# Patient Record
Sex: MALE | Race: WHITE | Employment: OTHER | URBAN - METROPOLITAN AREA
[De-identification: names, ages, dates, MRNs, and addresses within clinical notes are randomized per-mention and may not be internally consistent; named-entity substitution may affect disease eponyms.]

---

## 2017-05-25 PROBLEM — Z72.0 TOBACCO ABUSE: Chronic | Status: ACTIVE | Noted: 2017-05-25

## 2017-05-25 PROBLEM — I25.10 CORONARY ARTERY DISEASE INVOLVING NATIVE CORONARY ARTERY OF NATIVE HEART WITHOUT ANGINA PECTORIS: Chronic | Status: ACTIVE | Noted: 2017-05-25

## 2019-04-09 ENCOUNTER — PATIENT OUTREACH (OUTPATIENT)
Dept: CASE MANAGEMENT | Age: 74
End: 2019-04-09

## 2019-04-15 ENCOUNTER — PATIENT OUTREACH (OUTPATIENT)
Dept: CASE MANAGEMENT | Age: 74
End: 2019-04-15

## 2019-04-15 NOTE — PROGRESS NOTES
Medication Adherence Outreach Date/Time of Call:  4/15/2019 Name of medication and dosage:   IRBESARTAN  MG Does the patient know the purpose and dosage of medication? Yes Are you getting a #30 day or #90 day supply of your medication? #90 Are you still taking this medication? If not, why? Yes  
Transportation issues or any problems paying for the medication or other reason? No  
What pharmacy are you using to fill your medication and do you know the last time that you got your medication filled? [address] [place] [address] Last fill 3/8/2019 Are you having any side effects from taking your medication? No 
   
Any other questions or concerns expressed by the patient. No  
Comments:   
 Discussed Medication Adherence with patient-he stated that there are no barriers preventing him from obtaining or taking his medication. He is getting a 90 day refill from his PCP-No further outreach at this time. Call Completed By:   
 Ghanshyam Mckeon LPN Care Coordinator Josselin LINDER-[phone] [email or web address]

## 2019-07-22 ENCOUNTER — HOSPITAL ENCOUNTER (OUTPATIENT)
Dept: LAB | Age: 74
Discharge: HOME | End: 2019-07-22

## 2019-07-22 PROCEDURE — 88305 TISSUE EXAM BY PATHOLOGIST: CPT

## 2019-08-13 ENCOUNTER — HOSPITAL ENCOUNTER (OUTPATIENT)
Dept: LAB | Age: 74
Discharge: HOME | End: 2019-08-13

## 2019-08-13 PROCEDURE — 88305 TISSUE EXAM BY PATHOLOGIST: CPT

## 2019-08-13 PROCEDURE — 88312 SPECIAL STAINS GROUP 1: CPT

## 2020-05-20 PROBLEM — R00.0 SINUS TACHYCARDIA: Status: ACTIVE | Noted: 2020-05-20

## 2021-06-21 PROBLEM — N18.9 CHRONIC KIDNEY DISEASE: Status: ACTIVE | Noted: 2021-06-21

## 2021-10-11 ENCOUNTER — TRANSCRIBE ORDER (OUTPATIENT)
Dept: SCHEDULING | Age: 76
End: 2021-10-11

## 2021-10-11 DIAGNOSIS — N18.32 STAGE 3B CHRONIC KIDNEY DISEASE (HCC): Primary | ICD-10-CM

## 2021-10-14 ENCOUNTER — HOSPITAL ENCOUNTER (OUTPATIENT)
Age: 76
Setting detail: OBSERVATION
Discharge: HOME HEALTH | End: 2021-10-16
Attending: EMERGENCY MEDICINE | Admitting: INTERNAL MEDICINE
Payer: MEDICARE

## 2021-10-14 ENCOUNTER — APPOINTMENT (OUTPATIENT)
Dept: CT IMAGING | Age: 76
End: 2021-10-14
Attending: EMERGENCY MEDICINE
Payer: MEDICARE

## 2021-10-14 ENCOUNTER — APPOINTMENT (OUTPATIENT)
Dept: GENERAL RADIOLOGY | Age: 76
End: 2021-10-14
Attending: EMERGENCY MEDICINE
Payer: MEDICARE

## 2021-10-14 DIAGNOSIS — E16.0 HYPOGLYCEMIA SECONDARY TO SULFONYLUREA, ACCIDENTAL OR UNINTENTIONAL, INITIAL ENCOUNTER: ICD-10-CM

## 2021-10-14 DIAGNOSIS — R55 SYNCOPE, UNSPECIFIED SYNCOPE TYPE: Primary | ICD-10-CM

## 2021-10-14 DIAGNOSIS — T38.3X1A HYPOGLYCEMIA SECONDARY TO SULFONYLUREA, ACCIDENTAL OR UNINTENTIONAL, INITIAL ENCOUNTER: ICD-10-CM

## 2021-10-14 PROBLEM — E16.2 HYPOGLYCEMIA: Status: ACTIVE | Noted: 2021-10-14

## 2021-10-14 PROBLEM — N18.30 CKD (CHRONIC KIDNEY DISEASE) STAGE 3, GFR 30-59 ML/MIN (HCC): Status: ACTIVE | Noted: 2021-06-21

## 2021-10-14 LAB
ALBUMIN SERPL-MCNC: 3.2 G/DL (ref 3.2–4.6)
ALBUMIN/GLOB SERPL: 0.8 {RATIO} (ref 1.2–3.5)
ALP SERPL-CCNC: 122 U/L (ref 50–136)
ALT SERPL-CCNC: 25 U/L (ref 12–65)
ANION GAP SERPL CALC-SCNC: 7 MMOL/L (ref 7–16)
AST SERPL-CCNC: 39 U/L (ref 15–37)
ATRIAL RATE: 69 BPM
BASOPHILS # BLD: 0 K/UL (ref 0–0.2)
BASOPHILS NFR BLD: 1 % (ref 0–2)
BILIRUB SERPL-MCNC: 0.4 MG/DL (ref 0.2–1.1)
BUN SERPL-MCNC: 28 MG/DL (ref 8–23)
CALCIUM SERPL-MCNC: 8.7 MG/DL (ref 8.3–10.4)
CALCULATED P AXIS, ECG09: 66 DEGREES
CALCULATED R AXIS, ECG10: -12 DEGREES
CALCULATED T AXIS, ECG11: 82 DEGREES
CHLORIDE SERPL-SCNC: 105 MMOL/L (ref 98–107)
CO2 SERPL-SCNC: 26 MMOL/L (ref 21–32)
CREAT SERPL-MCNC: 2.03 MG/DL (ref 0.8–1.5)
DIAGNOSIS, 93000: NORMAL
DIFFERENTIAL METHOD BLD: ABNORMAL
EOSINOPHIL # BLD: 0.1 K/UL (ref 0–0.8)
EOSINOPHIL NFR BLD: 2 % (ref 0.5–7.8)
ERYTHROCYTE [DISTWIDTH] IN BLOOD BY AUTOMATED COUNT: 14.2 % (ref 11.9–14.6)
GLOBULIN SER CALC-MCNC: 3.9 G/DL (ref 2.3–3.5)
GLUCOSE BLD STRIP.AUTO-MCNC: 152 MG/DL (ref 65–100)
GLUCOSE BLD STRIP.AUTO-MCNC: 237 MG/DL (ref 65–100)
GLUCOSE BLD STRIP.AUTO-MCNC: 237 MG/DL (ref 65–100)
GLUCOSE SERPL-MCNC: 213 MG/DL (ref 65–100)
HCT VFR BLD AUTO: 47.4 % (ref 41.1–50.3)
HGB BLD-MCNC: 15.2 G/DL (ref 13.6–17.2)
IMM GRANULOCYTES # BLD AUTO: 0 K/UL (ref 0–0.5)
IMM GRANULOCYTES NFR BLD AUTO: 0 % (ref 0–5)
LYMPHOCYTES # BLD: 1.9 K/UL (ref 0.5–4.6)
LYMPHOCYTES NFR BLD: 39 % (ref 13–44)
MAGNESIUM SERPL-MCNC: 2.3 MG/DL (ref 1.8–2.4)
MCH RBC QN AUTO: 32 PG (ref 26.1–32.9)
MCHC RBC AUTO-ENTMCNC: 32.1 G/DL (ref 31.4–35)
MCV RBC AUTO: 99.8 FL (ref 79.6–97.8)
MONOCYTES # BLD: 0.9 K/UL (ref 0.1–1.3)
MONOCYTES NFR BLD: 18 % (ref 4–12)
NEUTS SEG # BLD: 1.9 K/UL (ref 1.7–8.2)
NEUTS SEG NFR BLD: 40 % (ref 43–78)
NRBC # BLD: 0 K/UL (ref 0–0.2)
P-R INTERVAL, ECG05: 204 MS
PLATELET # BLD AUTO: 168 K/UL (ref 150–450)
PMV BLD AUTO: 11.1 FL (ref 9.4–12.3)
POTASSIUM SERPL-SCNC: 5.2 MMOL/L (ref 3.5–5.1)
PROT SERPL-MCNC: 7.1 G/DL (ref 6.3–8.2)
Q-T INTERVAL, ECG07: 388 MS
QRS DURATION, ECG06: 74 MS
QTC CALCULATION (BEZET), ECG08: 415 MS
RBC # BLD AUTO: 4.75 M/UL (ref 4.23–5.6)
SERVICE CMNT-IMP: ABNORMAL
SODIUM SERPL-SCNC: 138 MMOL/L (ref 136–145)
TROPONIN-HIGH SENSITIVITY: 14.9 PG/ML (ref 0–14)
VENTRICULAR RATE, ECG03: 69 BPM
WBC # BLD AUTO: 4.8 K/UL (ref 4.3–11.1)

## 2021-10-14 PROCEDURE — 82962 GLUCOSE BLOOD TEST: CPT

## 2021-10-14 PROCEDURE — 74011250637 HC RX REV CODE- 250/637: Performed by: INTERNAL MEDICINE

## 2021-10-14 PROCEDURE — 80053 COMPREHEN METABOLIC PANEL: CPT

## 2021-10-14 PROCEDURE — 83735 ASSAY OF MAGNESIUM: CPT

## 2021-10-14 PROCEDURE — 84484 ASSAY OF TROPONIN QUANT: CPT

## 2021-10-14 PROCEDURE — 94664 DEMO&/EVAL PT USE INHALER: CPT

## 2021-10-14 PROCEDURE — 74011250636 HC RX REV CODE- 250/636: Performed by: INTERNAL MEDICINE

## 2021-10-14 PROCEDURE — 94640 AIRWAY INHALATION TREATMENT: CPT

## 2021-10-14 PROCEDURE — 65390000012 HC CONDITION CODE 44 OBSERVATION

## 2021-10-14 PROCEDURE — 85025 COMPLETE CBC W/AUTO DIFF WBC: CPT

## 2021-10-14 PROCEDURE — 71045 X-RAY EXAM CHEST 1 VIEW: CPT

## 2021-10-14 PROCEDURE — 65660000000 HC RM CCU STEPDOWN

## 2021-10-14 PROCEDURE — 70450 CT HEAD/BRAIN W/O DYE: CPT

## 2021-10-14 PROCEDURE — 93005 ELECTROCARDIOGRAM TRACING: CPT | Performed by: EMERGENCY MEDICINE

## 2021-10-14 PROCEDURE — 99285 EMERGENCY DEPT VISIT HI MDM: CPT

## 2021-10-14 PROCEDURE — 74011000250 HC RX REV CODE- 250: Performed by: EMERGENCY MEDICINE

## 2021-10-14 PROCEDURE — 94762 N-INVAS EAR/PLS OXIMTRY CONT: CPT

## 2021-10-14 RX ORDER — SODIUM CHLORIDE 0.9 % (FLUSH) 0.9 %
5-10 SYRINGE (ML) INJECTION EVERY 8 HOURS
Status: DISCONTINUED | OUTPATIENT
Start: 2021-10-14 | End: 2021-10-16 | Stop reason: HOSPADM

## 2021-10-14 RX ORDER — TAMSULOSIN HYDROCHLORIDE 0.4 MG/1
0.8 CAPSULE ORAL EVERY EVENING
Status: DISCONTINUED | OUTPATIENT
Start: 2021-10-14 | End: 2021-10-16 | Stop reason: HOSPADM

## 2021-10-14 RX ORDER — IPRATROPIUM BROMIDE AND ALBUTEROL SULFATE 2.5; .5 MG/3ML; MG/3ML
3 SOLUTION RESPIRATORY (INHALATION)
Status: COMPLETED | OUTPATIENT
Start: 2021-10-14 | End: 2021-10-14

## 2021-10-14 RX ORDER — ENOXAPARIN SODIUM 100 MG/ML
40 INJECTION SUBCUTANEOUS EVERY 24 HOURS
Status: DISCONTINUED | OUTPATIENT
Start: 2021-10-14 | End: 2021-10-16 | Stop reason: HOSPADM

## 2021-10-14 RX ORDER — SODIUM CHLORIDE 0.9 % (FLUSH) 0.9 %
5-40 SYRINGE (ML) INJECTION AS NEEDED
Status: DISCONTINUED | OUTPATIENT
Start: 2021-10-14 | End: 2021-10-16 | Stop reason: HOSPADM

## 2021-10-14 RX ORDER — DILTIAZEM HYDROCHLORIDE 120 MG/1
120 CAPSULE, COATED, EXTENDED RELEASE ORAL DAILY
Status: DISCONTINUED | OUTPATIENT
Start: 2021-10-15 | End: 2021-10-16 | Stop reason: HOSPADM

## 2021-10-14 RX ORDER — ONDANSETRON 2 MG/ML
4 INJECTION INTRAMUSCULAR; INTRAVENOUS
Status: DISCONTINUED | OUTPATIENT
Start: 2021-10-14 | End: 2021-10-16 | Stop reason: HOSPADM

## 2021-10-14 RX ORDER — SODIUM CHLORIDE 0.9 % (FLUSH) 0.9 %
5-10 SYRINGE (ML) INJECTION AS NEEDED
Status: DISCONTINUED | OUTPATIENT
Start: 2021-10-14 | End: 2021-10-16 | Stop reason: HOSPADM

## 2021-10-14 RX ORDER — ASPIRIN 81 MG/1
81 TABLET ORAL DAILY
Status: DISCONTINUED | OUTPATIENT
Start: 2021-10-15 | End: 2021-10-16 | Stop reason: HOSPADM

## 2021-10-14 RX ORDER — POLYETHYLENE GLYCOL 3350 17 G/17G
17 POWDER, FOR SOLUTION ORAL DAILY PRN
Status: DISCONTINUED | OUTPATIENT
Start: 2021-10-14 | End: 2021-10-16 | Stop reason: HOSPADM

## 2021-10-14 RX ORDER — DULOXETIN HYDROCHLORIDE 60 MG/1
60 CAPSULE, DELAYED RELEASE ORAL 2 TIMES DAILY
Status: DISCONTINUED | OUTPATIENT
Start: 2021-10-14 | End: 2021-10-16 | Stop reason: HOSPADM

## 2021-10-14 RX ORDER — ACETAMINOPHEN 650 MG/1
650 SUPPOSITORY RECTAL
Status: DISCONTINUED | OUTPATIENT
Start: 2021-10-14 | End: 2021-10-16 | Stop reason: HOSPADM

## 2021-10-14 RX ORDER — ATORVASTATIN CALCIUM 10 MG/1
20 TABLET, FILM COATED ORAL DAILY
Status: DISCONTINUED | OUTPATIENT
Start: 2021-10-15 | End: 2021-10-14 | Stop reason: SDUPTHER

## 2021-10-14 RX ORDER — PROMETHAZINE HYDROCHLORIDE 25 MG/1
12.5 TABLET ORAL
Status: DISCONTINUED | OUTPATIENT
Start: 2021-10-14 | End: 2021-10-16 | Stop reason: HOSPADM

## 2021-10-14 RX ORDER — SODIUM CHLORIDE 0.9 % (FLUSH) 0.9 %
5-40 SYRINGE (ML) INJECTION EVERY 8 HOURS
Status: DISCONTINUED | OUTPATIENT
Start: 2021-10-14 | End: 2021-10-16 | Stop reason: HOSPADM

## 2021-10-14 RX ORDER — ACETAMINOPHEN 325 MG/1
650 TABLET ORAL
Status: DISCONTINUED | OUTPATIENT
Start: 2021-10-14 | End: 2021-10-16 | Stop reason: HOSPADM

## 2021-10-14 RX ORDER — ATORVASTATIN CALCIUM 10 MG/1
20 TABLET, FILM COATED ORAL
Status: DISCONTINUED | OUTPATIENT
Start: 2021-10-14 | End: 2021-10-16 | Stop reason: HOSPADM

## 2021-10-14 RX ORDER — GABAPENTIN 300 MG/1
600 CAPSULE ORAL 3 TIMES DAILY
Status: DISCONTINUED | OUTPATIENT
Start: 2021-10-14 | End: 2021-10-16 | Stop reason: HOSPADM

## 2021-10-14 RX ORDER — TAMSULOSIN HYDROCHLORIDE 0.4 MG/1
0.4 CAPSULE ORAL DAILY
Status: DISCONTINUED | OUTPATIENT
Start: 2021-10-15 | End: 2021-10-14

## 2021-10-14 RX ADMIN — Medication 10 ML: at 21:34

## 2021-10-14 RX ADMIN — GABAPENTIN 600 MG: 300 CAPSULE ORAL at 21:27

## 2021-10-14 RX ADMIN — DULOXETINE HYDROCHLORIDE 60 MG: 60 CAPSULE, DELAYED RELEASE ORAL at 21:27

## 2021-10-14 RX ADMIN — TAMSULOSIN HYDROCHLORIDE 0.8 MG: 0.4 CAPSULE ORAL at 23:27

## 2021-10-14 RX ADMIN — IPRATROPIUM BROMIDE AND ALBUTEROL SULFATE 3 ML: .5; 3 SOLUTION RESPIRATORY (INHALATION) at 16:35

## 2021-10-14 RX ADMIN — ATORVASTATIN CALCIUM 20 MG: 10 TABLET, FILM COATED ORAL at 21:27

## 2021-10-14 NOTE — ED TRIAGE NOTES
Pt had two syncopal episodes last night, denies hitting head, denies BT. Per doctors care had blood sugar of 40 this morning. Doctors care gave him oral glucose and candy, last sugar 147. Pt A&O x4, currently lightheaded. Pt has diabetes, states he takes pills for sugar but does not check his sugar at home. Mask applied to pt.

## 2021-10-14 NOTE — H&P
[facility]   History and Physical       Name:  Libertad Nieves  Age: 64 y.o. Sex: male   :  1945    MRN:  [id number]   PCP:  Justin Chowdhury MD      Admit Date:  10/14/2021  2:03 PM   Presenting Complaint: Syncope and Low Blood Sugar      Reason for Admission:  Syncope and collapse [R55]  Hypoglycemia [E16.2]    Assessment & Plan:   Syncope and collapse  Possibly due to hypoglycemic event vs reaction to COVID19 booster, will need to rule out cardiogenic event. CT head reviewed. No intracranial abnormalities, chronic right maxillary sinusitis. - Telemetry  - PT    Hypoglycemic episodes  T2DM w/ diabetic polyneuropathy  A1c of 6.8 in 2021 and patient is on Januvia, Actos, glipizide. Reports no change in his diet or p.o. intake. - hold all oral agents  - ISS  - may need to stop Actos and glipizide on dc  - diabetic education consulted  - gabapentin  -If hypoglycemic again, consider sending serum insulin, C-peptide. CKD Stage 3  Cr of 2.03 on admission with last Cr of 1.93 on . It appears that CKD has been progressively getting worse. - urine electrolytes, renal ultrasounds   - dc actos as some studies has shown that it can cause CKD. HTN // HLD // CAD  -continue with home Cardizem, aspirin, Lipitor. COPD  Stable. Nebs PRN      Disposition: Inpatient admission, 2+ midnights  Diet: Diabetic     VTE ppx: lovenox  CODE STATUS: FULL      I have reviewed and ordered clinical lab tests and independently visualized images, tracing. I have reviewed, updated, and verified this note's content and spent 36 minutes of my 41 minutes visit performing counseling and coordination of care regarding medical management      History of Presenting Illness:     Libertad Nieves is a 64 y.o. male with medical history of pretension, hyperlipidemia, type 2 diabetes with polyneuropathy, CAD . CKD3 who presented to the ED from urgent care with hypoglycemic episode.   Patient reports that he received COVID-19 booster on 10/12 and started to feel unwell on 10/13. He had 2 syncopal episodes with one episode positive for loss of consciousness for about 1 second. He fell to his left side injuring his arm. He unclear if he hit his head. Patient went to the urgent care today to be hypoglycemic with fingerstick sugar of 40. He was given oral glucose and candy with repeat glucose of 147 and sent to emergency room. Patient reports that he was in normal state of health, without any fever, chills, shortness of breath, chest pain,, abdominal pain. Denies any change in his diet or decreased p.o. intake. In the ED, patient is hemodynamically stable and saturating well on room air. Fingerstick of 237 on arrival.  Laboratory work up is remarkable for potassium 5.2, BUN 28, creatinine 2.03. EKG with normal sinus rhythm with sinus arrhythmia. Review of Systems: 10 systems reviewed and negative except as noted in HPI. Past Medical History:   Diagnosis Date    Chronic airway obstruction, not elsewhere classified 7/1/2015    Essential hypertension, benign 7/1/2015    History of colonic polyps     Inflammatory and toxic neuropathy, unspecified 7/1/2015    Neoplasm of uncertain behavior of skin     Neuralgia, neuritis, and radiculitis, unspecified     Other and unspecified hyperlipidemia 7/1/2015    Type II or unspecified type diabetes mellitus without mention of complication, not stated as uncontrolled     Type II or unspecified type diabetes mellitus without mention of complication, uncontrolled 7/1/2015    Ulcer of ankle ([facility] [address] 7/1/2015       Past Surgical History:   Procedure Laterality Date    HX COLONOSCOPY  2007    w/ Dr. Ning Barrera         Family History : reviewed.    Family History   Problem Relation Age of Onset    Hypertension Father         Social History     Tobacco Use    Smoking status: Current Every Day Smoker     Packs/day: 0.50    Smokeless tobacco: Never Used   Substance Use Topics    Alcohol use: Yes     Alcohol/week: 0.0 standard drinks     Comment: Rare       Allergies   Allergen Reactions    Codeine Sulfate Unknown (comments)       Immunization History   Administered Date(s) Administered    COVID-19, PFIZER, MRNA, LNP-S, PF, 30MCG/0.3ML DOSE 01/22/2021, 02/17/2021    Influenza High Dose Vaccine PF 09/16/2016, 09/20/2018    Influenza Vaccine 09/27/2010, 09/08/2014, 10/01/2019, 10/10/2020    Influenza Vaccine (Tri) Adjuvanted (>65 Yrs FLUAD TRI [id number]) 09/17/2018, 10/09/2019    Influenza Vaccine (Whole Virus) 10/10/2008    Influenza, Quadrivalent, Adjuvanted (>65 Yrs FLUAD QUAD [id number]) 10/05/2020    Pneumococcal Conjugate (PCV-13) 09/16/2016, 10/16/2020    Pneumococcal Vaccine (Unspecified Type) 11/18/2009    Zoster Recombinant 10/16/2020         PTA Medications:  Current Outpatient Medications   Medication Instructions    aspirin delayed-release 81 mg tablet Oral, DAILY    atorvastatin (LIPITOR) 20 mg tablet TAKE ONE TABLET BY MOUTH ONCE DAILY    budesonide-formoteroL (SYMBICORT) 80-4.5 mcg/actuation HFAA 2 Puffs, Inhalation, 2 TIMES DAILY    dilTIAZem ER (CARDIZEM CD) 120 mg, Oral, DAILY    DULoxetine (CYMBALTA) 60 mg capsule TAKE ONE CAPSULE BY MOUTH TWICE A DAY    gabapentin (NEURONTIN) 600 mg tablet TAKE ONE TABLET BY MOUTH THREE TIMES A DAY    glipiZIDE (GLUCOTROL) 10 mg tablet TAKE 1 TABLET DAILY (BEFORE BREAKFAST).     melatonin 3 mg tablet Oral    OTHER Neuriva Take 1 capsule by mouth by once daily     pioglitazone (ACTOS) 15 mg, Oral, DAILY    SITagliptin (JANUVIA) 50 mg, Oral, DAILY    tamsulosin (FLOMAX) 0.4 mg capsule TAKE 2 CAPSULES ONCE A DAY      AFTER EVENING MEAL    valACYclovir (VALTREX) 1,000 mg, Oral, 2 TIMES DAILY, With food    zinc 50 mg tab tablet Oral, DAILY       Objective:     Patient Vitals for the past 24 hrs:   Temp Pulse Resp BP SpO2   10/14/21 1636     95 %   10/14/21 1406 97.7 °F (36.5 °C) 84 18 (!) 145/82 95 %       Oxygen Therapy  O2 Sat (%): 95 % (10/14/21 1636)  Pulse via Oximetry: 60 beats per minute (10/14/21 1636)  O2 Device: None (Room air) (10/14/21 1636)    Body mass index is 30.42 kg/m². Physical Exam:     General:     alert, awake, no acute distress. HENT:   normocephalic, atraumatic. Eyes:   anicteric sclerae, moist conjunctiva, EOMI  Neck:    supple, non-tender. Trachea midline. Lungs:   Scattered crackles  Cardiac:   RRR, Normal S1 and S2. Abdomen:   Soft, non distended, nontender, +BS, no guarding/rebound  Extremities:   No edema , pedal pulses present, no digital cyanosis  Skin:   Warn, dry, normnal turgor and texture; bruising to left arm  Neuro:  AAOx3.  No gross focal neurological deficit  Psychiatric:  No anxiety, calm, cooperative    Data Reviewed:  I have reviewed ordered lab tests and independently visualized imaging below:    Recent Results (from the past 24 hour(s))   GLUCOSE, POC    Collection Time: 10/14/21  2:13 PM   Result Value Ref Range    Glucose (POC) 237 (H) 65 - 100 mg/dL    Performed by Rusty    EKG, 12 LEAD, INITIAL    Collection Time: 10/14/21  2:37 PM   Result Value Ref Range    Ventricular Rate 69 BPM    Atrial Rate 69 BPM    P-R Interval 204 ms    QRS Duration 74 ms    Q-T Interval 388 ms    QTC Calculation (Bezet) 415 ms    Calculated P Axis 66 degrees    Calculated R Axis -12 degrees    Calculated T Axis 82 degrees    Diagnosis       Normal sinus rhythm with sinus arrhythmia  Normal ECG  No previous ECGs available  Confirmed by Ángela Magallon ([id number]) on 10/14/2021 4:09:42 PM     CBC WITH AUTOMATED DIFF    Collection Time: 10/14/21  2:44 PM   Result Value Ref Range    WBC 4.8 4.3 - 11.1 K/uL    RBC 4.75 4.23 - 5.6 M/uL    HGB 15.2 13.6 - 17.2 g/dL    HCT 47.4 41.1 - 50.3 %    MCV 99.8 (H) 79.6 - 97.8 FL    MCH 32.0 26.1 - 32.9 PG    MCHC 32.1 31.4 - 35.0 g/dL    RDW 14.2 11.9 - 14.6 %    PLATELET 191 926 - 733 K/uL    MPV 11.1 9.4 - 12.3 FL    ABSOLUTE NRBC 0. 00 0.0 - 0.2 K/uL    DF AUTOMATED      NEUTROPHILS 40 (L) 43 - 78 %    LYMPHOCYTES 39 13 - 44 %    MONOCYTES 18 (H) 4.0 - 12.0 %    EOSINOPHILS 2 0.5 - 7.8 %    BASOPHILS 1 0.0 - 2.0 %    IMMATURE GRANULOCYTES 0 0.0 - 5.0 %    ABS. NEUTROPHILS 1.9 1.7 - 8.2 K/UL    ABS. LYMPHOCYTES 1.9 0.5 - 4.6 K/UL    ABS. MONOCYTES 0.9 0.1 - 1.3 K/UL    ABS. EOSINOPHILS 0.1 0.0 - 0.8 K/UL    ABS. BASOPHILS 0.0 0.0 - 0.2 K/UL    ABS. IMM. GRANS. 0.0 0.0 - 0.5 K/UL   METABOLIC PANEL, COMPREHENSIVE    Collection Time: 10/14/21  2:44 PM   Result Value Ref Range    Sodium 138 136 - 145 mmol/L    Potassium 5.2 (H) 3.5 - 5.1 mmol/L    Chloride 105 98 - 107 mmol/L    CO2 26 21 - 32 mmol/L    Anion gap 7 7 - 16 mmol/L    Glucose 213 (H) 65 - 100 mg/dL    BUN 28 (H) 8 - 23 MG/DL    Creatinine 2.03 (H) 0.8 - 1.5 MG/DL    GFR est AA 41 (L) >60 ml/min/1.73m2    GFR est non-AA 34 (L) >60 ml/min/1.73m2    Calcium 8.7 8.3 - 10.4 MG/DL    Bilirubin, total 0.4 0.2 - 1.1 MG/DL    ALT (SGPT) 25 12 - 65 U/L    AST (SGOT) 39 (H) 15 - 37 U/L    Alk. phosphatase 122 50 - 136 U/L    Protein, total 7.1 6.3 - 8.2 g/dL    Albumin 3.2 3.2 - 4.6 g/dL    Globulin 3.9 (H) 2.3 - 3.5 g/dL    A-G Ratio 0.8 (L) 1.2 - 3.5     MAGNESIUM    Collection Time: 10/14/21  2:44 PM   Result Value Ref Range    Magnesium 2.3 1.8 - 2.4 mg/dL   TROPONIN-HIGH SENSITIVITY    Collection Time: 10/14/21  2:44 PM   Result Value Ref Range    Troponin-High Sensitivity 14.9 (H) 0 - 14 pg/mL   GLUCOSE, POC    Collection Time: 10/14/21  4:02 PM   Result Value Ref Range    Glucose (POC) 152 (H) 65 - 100 mg/dL    Performed by Mackinac Rocher        All Micro Results     None          Other Studies:  CT HEAD WO CONT    Result Date: 10/14/2021  CT HEAD WITHOUT CONTRAST HISTORY:  Syncope. COMPARISON: None. TECHNIQUE: Axial imaging was performed without intravenous contrast utilizing 5mm slice thickness. Sagittal and coronal reformats were performed.  Radiation dose reduction techniques were used for this study. Our CT scanner uses one or all of the following: Automated exposure control, adjustment of the MAS or KUB according to patient's size and iterative reconstruction. FINDINGS:    *BRAIN:    -  There are no early signs of territorial or lacunar infarction by CT.    -  No intracranial mass, hematoma, or hydrocephalus.    -  No gross white matter abnormality by CT. *VISUALIZED PARANASAL SINUSES: Please opacification of the right maxillary sinus. *MASTOIDS:  Clear. *CALVARIUM AND SCALP: Unremarkable. Unremarkable brain CT without intravenous contrast. chronic right maxillary sinusitis. Date of Dictation: 10/14/2021 3:05 PM      XR CHEST PORT    Result Date: 10/14/2021  EXAM: XR CHEST PORT INDICATION: Syncope COMPARISON: 9/30/2016 FINDINGS: A portable AP radiograph of the chest was obtained at 1414 hours. The patient is on a cardiac monitor. The lungs are clear. The cardiac and mediastinal contours and pulmonary vascularity are normal.  The bones and soft tissues are grossly within normal limits.      Normal chest.        Medications:      Problem List:     Hospital Problems as of 10/14/2021 Date Reviewed: 9/27/2021        Codes Class Noted - Resolved POA    * (Principal) Syncope and collapse ICD-10-CM: R55  ICD-9-CM: 780.2  10/14/2021 - Present Yes        Hypoglycemia ICD-10-CM: E16.2  ICD-9-CM: 251.2  10/14/2021 - Present Yes        Coronary artery disease involving native coronary artery of native heart without angina pectoris (Chronic) ICD-10-CM: I25.10  ICD-9-CM: 414.01  5/25/2017 - Present Yes        Tobacco abuse (Chronic) ICD-10-CM: Z72.0  ICD-9-CM: 305.1  5/25/2017 - Present Yes        Essential hypertension, benign ICD-10-CM: I10  ICD-9-CM: 401.1  7/1/2015 - Present Yes        Type 2 diabetes mellitus with diabetic polyneuropathy, without long-term current use of insulin (HCC) ICD-10-CM: E11.42  ICD-9-CM: 250.60, 357.2  7/1/2015 - Present Yes        COPD (chronic obstructive pulmonary disease) [facility]) ICD-10-CM: J44.9  ICD-9-CM: 741  7/1/2015 - Present Yes                 Part of this note was written by using a voice dictation software and the note has been proof read but may still contain some grammatical/other typographical errors.        Lizett Doyle MD  [facility] SPECIALTY [facility] Hospitalist Service  October 14, 2021    4:31 PM

## 2021-10-14 NOTE — ED PROVIDER NOTES
43-year-old male with history of type 2 diabetes mellitus, hypertension, COPD presents with complaint of 2 single episodes last night. Patient states that he began to feel weak and fatigued after he got his Pfizer booster shot on Tuesday. Denies chest pain, shortness of breath, abdominal pain, nausea, vomiting, fever, chills, diarrhea, melena, hematochezia. Patient denies neck pain, back pain. Patient was seen at [address] [place] [address] earlier today and had blood glucose of 40 this morning. Dr. Messi Lester gave patient oral glucose and candy. Last blood glucose 147. Patient reports mild fatigue. Denies dizziness, lightheadedness, face droop, slurred speech, difficulty ambulating. Med list at this time includes Januvia, Actos, and glipizide. The history is provided by the patient. No  was used. Syncope   Pertinent negatives include no chest pain, no palpitations, no confusion, no diaphoresis, no fever, no abdominal pain, no nausea, no vomiting, no congestion, no headaches, no back pain, no light-headedness and no seizures. His past medical history is significant for syncope. Low Blood Sugar   Pertinent negatives include no confusion and no seizures.         Past Medical History:   Diagnosis Date    Chronic airway obstruction, not elsewhere classified 7/1/2015    Essential hypertension, benign 7/1/2015    History of colonic polyps     Inflammatory and toxic neuropathy, unspecified 7/1/2015    Neoplasm of uncertain behavior of skin     Neuralgia, neuritis, and radiculitis, unspecified     Other and unspecified hyperlipidemia 7/1/2015    Type II or unspecified type diabetes mellitus without mention of complication, not stated as uncontrolled     Type II or unspecified type diabetes mellitus without mention of complication, uncontrolled 7/1/2015    Ulcer of ankle ([facility] [address] 7/1/2015       Past Surgical History:   Procedure Laterality Date    HX COLONOSCOPY  2007    w/ Dr. Bard Duvall REPLACEMENT           Family History:   Problem Relation Age of Onset    Hypertension Father        Social History     Socioeconomic History    Marital status:      Spouse name: Not on file    Number of children: Not on file    Years of education: Not on file    Highest education level: Not on file   Occupational History    Not on file   Tobacco Use    Smoking status: Current Every Day Smoker     Packs/day: 0.50    Smokeless tobacco: Never Used   Substance and Sexual Activity    Alcohol use: Yes     Alcohol/week: 0.0 standard drinks     Comment: Rare    Drug use: Not on file    Sexual activity: Not on file   Other Topics Concern    Not on file   Social History Narrative    Not on file     Social Determinants of Health     Financial Resource Strain:     Difficulty of Paying Living Expenses:    Food Insecurity:     Worried About Running Out of Food in the Last Year:     [address] in the Last Year:    Transportation Needs:     Lack of Transportation (Medical):  Lack of Transportation (Non-Medical):    Physical Activity:     Days of Exercise per Week:     Minutes of Exercise per Session:    Stress:     Feeling of Stress :    Social Connections:     Frequency of Communication with Friends and Family:     Frequency of Social Gatherings with Friends and Family:     Attends Orthodoxy Services:     Active Member of Clubs or Organizations:     Attends Club or Organization Meetings:     Marital Status:    Intimate Partner Violence:     Fear of Current or Ex-Partner:     Emotionally Abused:     Physically Abused:     Sexually Abused: ALLERGIES: Codeine sulfate    Review of Systems   Constitutional: Positive for fatigue. Negative for chills, diaphoresis and fever. HENT: Negative for congestion, rhinorrhea and sore throat. Respiratory: Negative for cough and shortness of breath. Cardiovascular: Positive for syncope.  Negative for chest pain, palpitations and leg swelling. Gastrointestinal: Negative for abdominal pain, diarrhea, nausea and vomiting. Genitourinary: Negative for dysuria and flank pain. Musculoskeletal: Negative for arthralgias, back pain and myalgias. Skin: Negative for pallor and rash. Neurological: Positive for syncope. Negative for tremors, seizures, facial asymmetry, speech difficulty, light-headedness and headaches. Hematological: Does not bruise/bleed easily. Psychiatric/Behavioral: Negative for confusion. Vitals:    10/14/21 1406   BP: (!) 145/82   Pulse: 84   Resp: 18   Temp: 97.7 °F (36.5 °C)   SpO2: 95%   Weight: 93.4 kg (206 lb)   Height: 5' 9\" (1.753 m)            Physical Exam  Vitals and nursing note reviewed. HENT:      Head: Normocephalic and atraumatic. Comments: Atraumatic. No finding suggestive of basilar skull fracture. Nose: Nose normal.      Mouth/Throat:      Mouth: Mucous membranes are moist.   Eyes:      Extraocular Movements: Extraocular movements intact. Pupils: Pupils are equal, round, and reactive to light. Neck:      Comments: Full range of motion. No step-off. No midline Cspine TTP. Cardiovascular:      Rate and Rhythm: Normal rate. Pulses: Normal pulses. Heart sounds: Normal heart sounds. Pulmonary:      Effort: Pulmonary effort is normal.      Comments: Mild bilateral expiratory wheezes noted. Abdominal:      General: Bowel sounds are normal.      Palpations: Abdomen is soft. Tenderness: There is no abdominal tenderness. There is no guarding. Comments: Soft, nontender, nondistended. No rebound or guarding. Musculoskeletal:         General: No swelling or deformity. Normal range of motion. Cervical back: Normal range of motion. Skin:     General: Skin is warm. Findings: No erythema or rash. Neurological:      General: No focal deficit present. Mental Status: He is alert and oriented to person, place, and time.       Cranial Nerves: No cranial nerve deficit. Motor: No weakness. Comments: Strength 5 out of 5 throughout. Normal sensory sign. No drift. No facial droop. No dysarthria. MDM  Number of Diagnoses or Management Options  Hypoglycemia secondary to sulfonylurea, accidental or unintentional, initial encounter  Hypoglycemia: new and requires workup  Syncope, unspecified syncope type: new and requires workup  Diagnosis management comments: POC glucose 237 after he was given sugar candy and oral glucose. Orthostatics negative. Chest x-ray read as normal.  CT head with no acute intracranial normality noted. Creatinine at baseline. EKG with normal sinus rhythm. No ischemic changes noted. Initial troponin 14.9. Repeat troponin pending. Repeat POC glucose 153. Will continue to closely monitor POC glucose closely. Will consult Hospitalist for admission/observation. Amount and/or Complexity of Data Reviewed  Clinical lab tests: ordered and reviewed  Tests in the radiology section of CPT®: ordered and reviewed  Tests in the medicine section of CPT®: ordered and reviewed  Review and summarize past medical records: yes  Independent visualization of images, tracings, or specimens: yes    Risk of Complications, Morbidity, and/or Mortality  Presenting problems: moderate  Diagnostic procedures: moderate  Management options: moderate    Patient Progress  Patient progress: stable    ED Course as of Oct 14 1610   Thu Oct 14, 2021   1430 CXR FINDINGS: A portable AP radiograph of the chest was obtained at 1414 hours. The  patient is on a cardiac monitor. The lungs are clear.  The cardiac and  mediastinal contours and pulmonary vascularity are normal.  The bones and soft  tissues are grossly within normal limits.      IMPRESSION  Normal chest.    [DF]   1531 Creatinine(!): 2.03 [DF]   1531 CT head FINDINGS:         *BRAIN:      -  There are no early signs of territorial or lacunar infarction by CT.     -  No intracranial mass, hematoma, or hydrocephalus.      -  No gross white matter abnormality by CT.     *VISUALIZED PARANASAL SINUSES: Please opacification of the right maxillary  sinus. *MASTOIDS:  Clear. *CALVARIUM AND SCALP: Unremarkable.        IMPRESSION     Unremarkable brain CT without intravenous contrast.     chronic right maxillary sinusitis. [DF]   2498 Troponin-High Sensitivity(!): 14.9 [DF]   1605 Potassium hemolyzed. Potassium(!): 5.2 [DF]   1609 Repeat glucose 153. [DF]      ED Course User Index  [DF] Roberto Valenzuela MD       EKG    Date/Time: 10/14/2021 3:47 PM  Performed by: Roberto Valenzuela MD  Authorized by:  Roberto Valenzuela MD     ECG reviewed by ED Physician in the absence of a cardiologist: yes    Rate:     ECG rate:  69    ECG rate assessment: normal    Rhythm:     Rhythm: sinus rhythm    Ectopy:     Ectopy: none    QRS:     QRS axis:  Normal    QRS intervals:  Normal  Conduction:     Conduction: normal    ST segments:     ST segments:  Normal  T waves:     T waves: normal              Results Include:    Recent Results (from the past 24 hour(s))   GLUCOSE, POC    Collection Time: 10/14/21  2:13 PM   Result Value Ref Range    Glucose (POC) 237 (H) 65 - 100 mg/dL    Performed by Rusty    EKG, 12 LEAD, INITIAL    Collection Time: 10/14/21  2:37 PM   Result Value Ref Range    Ventricular Rate 69 BPM    Atrial Rate 69 BPM    P-R Interval 204 ms    QRS Duration 74 ms    Q-T Interval 388 ms    QTC Calculation (Bezet) 415 ms    Calculated P Axis 66 degrees    Calculated R Axis -12 degrees    Calculated T Axis 82 degrees    Diagnosis       Normal sinus rhythm with sinus arrhythmia  Normal ECG  No previous ECGs available     CBC WITH AUTOMATED DIFF    Collection Time: 10/14/21  2:44 PM   Result Value Ref Range    WBC 4.8 4.3 - 11.1 K/uL    RBC 4.75 4.23 - 5.6 M/uL    HGB 15.2 13.6 - 17.2 g/dL    HCT 47.4 41.1 - 50.3 %    MCV 99.8 (H) 79.6 - 97.8 FL    MCH 32.0 26.1 - 32.9 PG MCHC 32.1 31.4 - 35.0 g/dL    RDW 14.2 11.9 - 14.6 %    PLATELET 428 139 - 106 K/uL    MPV 11.1 9.4 - 12.3 FL    ABSOLUTE NRBC 0.00 0.0 - 0.2 K/uL    DF AUTOMATED      NEUTROPHILS 40 (L) 43 - 78 %    LYMPHOCYTES 39 13 - 44 %    MONOCYTES 18 (H) 4.0 - 12.0 %    EOSINOPHILS 2 0.5 - 7.8 %    BASOPHILS 1 0.0 - 2.0 %    IMMATURE GRANULOCYTES 0 0.0 - 5.0 %    ABS. NEUTROPHILS 1.9 1.7 - 8.2 K/UL    ABS. LYMPHOCYTES 1.9 0.5 - 4.6 K/UL    ABS. MONOCYTES 0.9 0.1 - 1.3 K/UL    ABS. EOSINOPHILS 0.1 0.0 - 0.8 K/UL    ABS. BASOPHILS 0.0 0.0 - 0.2 K/UL    ABS. IMM. GRANS. 0.0 0.0 - 0.5 K/UL   METABOLIC PANEL, COMPREHENSIVE    Collection Time: 10/14/21  2:44 PM   Result Value Ref Range    Sodium 138 136 - 145 mmol/L    Potassium 5.2 (H) 3.5 - 5.1 mmol/L    Chloride 105 98 - 107 mmol/L    CO2 26 21 - 32 mmol/L    Anion gap 7 7 - 16 mmol/L    Glucose 213 (H) 65 - 100 mg/dL    BUN 28 (H) 8 - 23 MG/DL    Creatinine 2.03 (H) 0.8 - 1.5 MG/DL    GFR est AA 41 (L) >60 ml/min/1.73m2    GFR est non-AA 34 (L) >60 ml/min/1.73m2    Calcium 8.7 8.3 - 10.4 MG/DL    Bilirubin, total 0.4 0.2 - 1.1 MG/DL    ALT (SGPT) 25 12 - 65 U/L    AST (SGOT) 39 (H) 15 - 37 U/L    Alk. phosphatase 122 50 - 136 U/L    Protein, total 7.1 6.3 - 8.2 g/dL    Albumin 3.2 3.2 - 4.6 g/dL    Globulin 3.9 (H) 2.3 - 3.5 g/dL    A-G Ratio 0.8 (L) 1.2 - 3.5     MAGNESIUM    Collection Time: 10/14/21  2:44 PM   Result Value Ref Range    Magnesium 2.3 1.8 - 2.4 mg/dL   TROPONIN-HIGH SENSITIVITY    Collection Time: 10/14/21  2:44 PM   Result Value Ref Range    Troponin-High Sensitivity 14.9 (H) 0 - 14 pg/mL                Teja Garcia MD; 10/14/2021 @2:32 PM Voice dictation software was used during the making of this note. This software is not perfect and grammatical and other typographical errors may be present.   This note has not been proofread for errors.  ===================================================================

## 2021-10-15 ENCOUNTER — APPOINTMENT (OUTPATIENT)
Dept: ULTRASOUND IMAGING | Age: 76
End: 2021-10-15
Attending: NURSE PRACTITIONER
Payer: MEDICARE

## 2021-10-15 ENCOUNTER — APPOINTMENT (OUTPATIENT)
Dept: NON INVASIVE DIAGNOSTICS | Age: 76
End: 2021-10-15
Attending: NURSE PRACTITIONER
Payer: MEDICARE

## 2021-10-15 PROBLEM — R55 SYNCOPE: Status: ACTIVE | Noted: 2021-10-15

## 2021-10-15 LAB
ANION GAP SERPL CALC-SCNC: 6 MMOL/L (ref 7–16)
BUN SERPL-MCNC: 30 MG/DL (ref 8–23)
CALCIUM SERPL-MCNC: 8.6 MG/DL (ref 8.3–10.4)
CHLORIDE SERPL-SCNC: 109 MMOL/L (ref 98–107)
CO2 SERPL-SCNC: 27 MMOL/L (ref 21–32)
CREAT SERPL-MCNC: 1.75 MG/DL (ref 0.8–1.5)
ECHO AO ASC DIAM: 3.4 CM
ECHO AO ROOT DIAM: 3.2 CM
ECHO AV AREA PEAK VELOCITY: 2.41 CM2
ECHO AV AREA VTI: 2.19 CM2
ECHO AV AREA/BSA PEAK VELOCITY: 1.2 CM2/M2
ECHO AV AREA/BSA VTI: 1 CM2/M2
ECHO AV MEAN GRADIENT: 5 MMHG
ECHO AV PEAK GRADIENT: 10 MMHG
ECHO AV PEAK VELOCITY: 157 CM/S
ECHO AV VTI: 30.2 CM
ECHO LA AREA 2C: 16.5 CM2
ECHO LA AREA 4C: 18.5 CM2
ECHO LA MAJOR AXIS: 5.3 CM
ECHO LA MINOR AXIS: 2.54 CM
ECHO LV E' LATERAL VELOCITY: 9.46 CM/S
ECHO LV E' SEPTAL VELOCITY: 8.97 CM/S
ECHO LV EDV A2C: 91 CM3
ECHO LV EDV A4C: 94 CM3
ECHO LV ESV A2C: 32 CM3
ECHO LV ESV A4C: 31 CM3
ECHO LV INTERNAL DIMENSION DIASTOLIC: 5.05 CM (ref 4.2–5.9)
ECHO LV INTERNAL DIMENSION SYSTOLIC: 2.99 CM
ECHO LV IVSD: 1.02 CM (ref 0.6–1)
ECHO LV MASS 2D: 185 G (ref 88–224)
ECHO LV MASS INDEX 2D: 88.5 G/M2 (ref 49–115)
ECHO LV POSTERIOR WALL DIASTOLIC: 0.98 CM (ref 0.6–1)
ECHO LVOT DIAM: 1.9 CM
ECHO LVOT PEAK GRADIENT: 7 MMHG
ECHO LVOT VTI: 23.3 CM
ECHO MV A VELOCITY: 109 CM/S
ECHO MV E DECELERATION TIME (DT): 201 MS
ECHO MV E VELOCITY: 91.3 CM/S
ECHO MV E/A RATIO: 0.84
ECHO MV E/E' LATERAL: 9.65
ECHO MV E/E' RATIO (AVERAGED): 9.91
ECHO MV E/E' SEPTAL: 10.18
ECHO RV INTERNAL DIMENSION: 2.7 CM
ECHO RV TAPSE: 2.2 CM (ref 1.5–2)
ERYTHROCYTE [DISTWIDTH] IN BLOOD BY AUTOMATED COUNT: 14.1 % (ref 11.9–14.6)
GLUCOSE BLD STRIP.AUTO-MCNC: 111 MG/DL (ref 65–100)
GLUCOSE BLD STRIP.AUTO-MCNC: 121 MG/DL (ref 65–100)
GLUCOSE BLD STRIP.AUTO-MCNC: 152 MG/DL (ref 65–100)
GLUCOSE BLD STRIP.AUTO-MCNC: 94 MG/DL (ref 65–100)
GLUCOSE SERPL-MCNC: 80 MG/DL (ref 65–100)
HCT VFR BLD AUTO: 42.1 % (ref 41.1–50.3)
HGB BLD-MCNC: 13.7 G/DL (ref 13.6–17.2)
MCH RBC QN AUTO: 31.9 PG (ref 26.1–32.9)
MCHC RBC AUTO-ENTMCNC: 32.5 G/DL (ref 31.4–35)
MCV RBC AUTO: 98.1 FL (ref 79.6–97.8)
NRBC # BLD: 0 K/UL (ref 0–0.2)
PLATELET # BLD AUTO: 176 K/UL (ref 150–450)
PMV BLD AUTO: 11.2 FL (ref 9.4–12.3)
POTASSIUM SERPL-SCNC: 4.1 MMOL/L (ref 3.5–5.1)
RBC # BLD AUTO: 4.29 M/UL (ref 4.23–5.6)
SERVICE CMNT-IMP: ABNORMAL
SERVICE CMNT-IMP: NORMAL
SODIUM SERPL-SCNC: 142 MMOL/L (ref 136–145)
WBC # BLD AUTO: 6.1 K/UL (ref 4.3–11.1)

## 2021-10-15 PROCEDURE — 97161 PT EVAL LOW COMPLEX 20 MIN: CPT

## 2021-10-15 PROCEDURE — 36415 COLL VENOUS BLD VENIPUNCTURE: CPT

## 2021-10-15 PROCEDURE — C8929 TTE W OR WO FOL WCON,DOPPLER: HCPCS

## 2021-10-15 PROCEDURE — 85027 COMPLETE CBC AUTOMATED: CPT

## 2021-10-15 PROCEDURE — 74011250636 HC RX REV CODE- 250/636: Performed by: INTERNAL MEDICINE

## 2021-10-15 PROCEDURE — 82962 GLUCOSE BLOOD TEST: CPT

## 2021-10-15 PROCEDURE — 97116 GAIT TRAINING THERAPY: CPT

## 2021-10-15 PROCEDURE — 99218 HC RM OBSERVATION: CPT

## 2021-10-15 PROCEDURE — 65390000012 HC CONDITION CODE 44 OBSERVATION

## 2021-10-15 PROCEDURE — 80048 BASIC METABOLIC PNL TOTAL CA: CPT

## 2021-10-15 PROCEDURE — 93880 EXTRACRANIAL BILAT STUDY: CPT

## 2021-10-15 PROCEDURE — 74011250637 HC RX REV CODE- 250/637: Performed by: INTERNAL MEDICINE

## 2021-10-15 PROCEDURE — 74011000250 HC RX REV CODE- 250: Performed by: INTERNAL MEDICINE

## 2021-10-15 RX ADMIN — GABAPENTIN 600 MG: 300 CAPSULE ORAL at 17:00

## 2021-10-15 RX ADMIN — PERFLUTREN 1 ML: 6.52 INJECTION, SUSPENSION INTRAVENOUS at 09:37

## 2021-10-15 RX ADMIN — DULOXETINE HYDROCHLORIDE 60 MG: 60 CAPSULE, DELAYED RELEASE ORAL at 08:56

## 2021-10-15 RX ADMIN — GABAPENTIN 600 MG: 300 CAPSULE ORAL at 21:27

## 2021-10-15 RX ADMIN — TAMSULOSIN HYDROCHLORIDE 0.8 MG: 0.4 CAPSULE ORAL at 17:00

## 2021-10-15 RX ADMIN — DULOXETINE HYDROCHLORIDE 60 MG: 60 CAPSULE, DELAYED RELEASE ORAL at 17:00

## 2021-10-15 RX ADMIN — GABAPENTIN 600 MG: 300 CAPSULE ORAL at 08:55

## 2021-10-15 RX ADMIN — ASPIRIN 81 MG: 81 TABLET, COATED ORAL at 08:55

## 2021-10-15 RX ADMIN — ATORVASTATIN CALCIUM 20 MG: 10 TABLET, FILM COATED ORAL at 21:26

## 2021-10-15 RX ADMIN — DILTIAZEM HYDROCHLORIDE 120 MG: 120 CAPSULE, COATED, EXTENDED RELEASE ORAL at 08:55

## 2021-10-15 NOTE — DIABETES MGMT
Patient admitted with syncope and hypoglycemia. History of HTN, HLD, COPD, CAD, and CKD. Pt states that he hadn't been feeling well since receiving a COVID-19 booster on Monday 10/11. Pt states that he went to the urgent care yesterday and his blood glucose was 40. Pt states that he was given oral glucose and candy then sent to the ER. Blood glucose on admission was 237. FSBS this morning 94. Creatinine: 1.75. GFR: 40. HbA1c 6.8%. Pt states that he was diagnosed with diabetes about 15 years ago. Pt states that he does not know the names of the medications that he takes at home for diabetes because his wife organizes his pills for him. Pt states that it would be fine to call his wife to confirm current medication names. Per patient's spouse, prior to admission medications include: Glipizide 10 mg daily before breakfast, Januvia 50 mg daily and Actos 15 mg daily. Pt states that he has a few working blood glucose meters at home, but never monitors his blood glucose because its too painful. Explained the importance of blood glucose monitoring. Pt states that he was not aware that his blood glucose was dropping when he went to the urgent care. Pt given a new Contour Next blood glucose meter and was instructed in it's use. Pt states that the lancing device was much better because he could adjust the lancing depth. Pt was able to return demonstrate correct use of meter. Given recent episode of severe hypoglycemia, would recommend monitoring blood glucose bid and to record in log book to bring to PCP appointment to assist with medication titration. Discussed target goals for HbA1c and blood glucose. Discussed signs, symptoms and treatments for hypoglycemia and to notify PCP if having any s/s. Pt verbalizes understanding. Patient given educational material, \"Diabetes Self-Management: A Patient Teaching Guide\", which was reviewed with patient.  Explained basic physiology of diabetes, as well as causes, signs and symptoms, and treatments for hypoglycemia and hyperglycemia. Described the effects of poor glycemic control and the development of long-term complications such as renal, eye, nerve, and cardiovascular disease. Described proper diabetic foot care and the importance of checking feet daily. Pt states that he checks his feet every night before bed. Per patient they typically eat \"like a rabbit\". Educated re: effects of carbohydrates on blood glucose, the \"plate method\" of healthy meal planning, basics of healthy meal plan, and Consistent Carbohydrate Diet. Explained the relationship between hyperglycemia and infection and delayed healing. Discussed target goals for blood glucose and A1C. Educated patient regarding diabetic medications including mechanism of action, timing, and possible side effects. Patient verbalizes understanding of teaching. Stressed the importance of follow up care for diabetes management with PCP and to bring blood glucose log book to appointments. Pt politely refuses a referral to [facility]. Patient has no further questions regarding diabetes management at this time.

## 2021-10-15 NOTE — PROGRESS NOTES
Hospitalist Progress Note   Admit Date:  10/14/2021  2:03 PM   Name:  Reyes Lewis   Age:  64 y.o. Sex:  male  :  1945   MRN:  [id number]   Room:  [facility]    Presenting Complaint: Syncope and Low Blood Sugar    Reason(s) for Admission: Syncope and collapse [R55]  Hypoglycemia [E16.2]     Hospital Course & Interval History:   64 y.o. male with medical history of pretension, hyperlipidemia, type 2 diabetes with polyneuropathy, CAD . CKD3 who presented to the ED from urgent care with hypoglycemic episode. Patient reports that he received COVID-19 booster on 10/12 and started to feel unwell on 10/13. He had 2 syncopal episodes with one episode positive for loss of consciousness for about 1 second. He fell to his left side injuring his arm. He unclear if he hit his head. Patient went to the urgent care today to be hypoglycemic with fingerstick sugar of 40. He was given oral glucose and candy with repeat glucose of 147 and sent to emergency room. Subjective (10/15/21): Alert and oriented x3. Toan Markoia to go home. Assessment & Plan:     Principal Problem:    Syncope and collapse (10/14/2021)    CT head no abnormalities  Check echo  Check carotid dopplers  telemetry    Active Problems:    Essential hypertension, benign (2015)    Controlled; continue Cardizem      Type 2 diabetes mellitus with diabetic polyneuropathy, without long-term current use of insulin ([facility] [address] (2015)    SSI  Hold oral agents, seen by diabetic educator and will stop Glipizide until seen by PCP outpatient. A1C 6.8 in September      COPD (chronic obstructive pulmonary disease) ([address] (2015)    No exacerbation, nebs prn      Coronary artery disease involving native coronary artery of native heart without angina pectoris (2017)    Home medications. CKD (chronic kidney disease) stage 3, GFR 30-59 ml/min ([facility]) (2021)    Cr of 2.03 on admission with last Cr of 1.93 on .  It appears that CKD has been progressively getting worse. - urine electrolytes, renal ultrasounds   - dc actos as some studies has shown that it can cause CKD. Hypoglycemia (10/14/2021)    Holding oral agents  No further episodes, actually on higher side except for this am which was 94            Dispo/Discharge Planning:      Home when stable, likely tomorrow.     Diet:  ADULT DIET Regular; 4 carb choices (60 gm/meal)  DVT PPx: Lovenox SQ  Code status: Full Code    Hospital Problems as of 10/15/2021 Date Reviewed: 9/27/2021        Codes Class Noted - Resolved POA    * (Principal) Syncope and collapse ICD-10-CM: R55  ICD-9-CM: 780.2  10/14/2021 - Present Yes        Hypoglycemia ICD-10-CM: E16.2  ICD-9-CM: 251.2  10/14/2021 - Present Yes        CKD (chronic kidney disease) stage 3, GFR 30-59 ml/min ([facility]) ICD-10-CM: N18.30  ICD-9-CM: 585.3  6/21/2021 - Present Yes        Coronary artery disease involving native coronary artery of native heart without angina pectoris (Chronic) ICD-10-CM: I25.10  ICD-9-CM: 414.01  5/25/2017 - Present Yes        Tobacco abuse (Chronic) ICD-10-CM: Z72.0  ICD-9-CM: 305.1  5/25/2017 - Present Yes        Essential hypertension, benign ICD-10-CM: I10  ICD-9-CM: 401.1  7/1/2015 - Present Yes        Type 2 diabetes mellitus with diabetic polyneuropathy, without long-term current use of insulin (HCC) ICD-10-CM: E11.42  ICD-9-CM: 250.60, 357.2  7/1/2015 - Present Yes        COPD (chronic obstructive pulmonary disease) ([facility] ICD-10-CM: J44.9  ICD-9-CM: 496  7/1/2015 - Present Yes              Objective:     Patient Vitals for the past 24 hrs:   Temp Pulse Resp BP SpO2   10/15/21 0937    131/86    10/15/21 0700 97 °F (36.1 °C) 80 20 131/86 92 %   10/15/21 0358 97.8 °F (36.6 °C) 64 18 134/67 94 %   10/14/21 2315 98.3 °F (36.8 °C) (!) 58 18 122/61 96 %   10/14/21 2115 97.9 °F (36.6 °C) 63 23 (!) 147/85 91 %   10/14/21 2025  79 25  95 %   10/14/21 2014  75 17 (!) 140/80 93 %   10/14/21 1944  76 (!) 42 128/72 91 % 10/14/21 1930  (!) 48 (!) 33  93 %   10/14/21 1844  67 (!) 38 (!) 142/77 92 %   10/14/21 1832  (!) 58 20  95 %   10/14/21 1830  81      10/14/21 1814    128/60    10/14/21 1733  72      10/14/21 1728  (!) 57 17  93 %   10/14/21 1714  74 28 (!) 140/67 95 %   10/14/21 1636     95 %   10/14/21 1406 97.7 °F (36.5 °C) 84 18 (!) 145/82 95 %     Oxygen Therapy  O2 Sat (%): 92 % (10/15/21 0700)  Pulse via Oximetry: 75 beats per minute (10/14/21 2025)  O2 Device: Nasal cannula (10/14/21 2315)  O2 Flow Rate (L/min): 2 l/min (10/14/21 2115)    Estimated body mass index is 30.42 kg/m² as calculated from the following:    Height as of this encounter: 5' 9\" (1.753 m). Weight as of this encounter: 93.4 kg (206 lb). Intake/Output Summary (Last 24 hours) at 10/15/2021 1044  Last data filed at 10/15/2021 0450  Gross per 24 hour   Intake    Output 850 ml   Net -850 ml         Physical Exam:     Blood pressure 131/86, pulse 80, temperature 97 °F (36.1 °C), resp. rate 20, height 5' 9\" (1.753 m), weight 93.4 kg (206 lb), SpO2 92 %. General:    Well nourished. No overt distress  CV:   RRR. No m/r/g. No jugular venous distension. Lungs:   CTAB. No wheezing, rhonchi, or rales. Respirations even, unlabored  Abdomen: Bowel sounds present. Soft, nontender, nondistended. Extremities: No cyanosis or clubbing. No edema  Skin:     No rashes and normal coloration. Warm and dry. Neuro:  CN II-XII grossly intact. Sensation intact. A&Ox3  Psych:  Normal mood and affect.       I have reviewed ordered lab tests and independently visualized imaging below:    Recent Labs:  Recent Results (from the past 48 hour(s))   GLUCOSE, POC    Collection Time: 10/14/21  2:13 PM   Result Value Ref Range    Glucose (POC) 237 (H) 65 - 100 mg/dL    Performed by Rusty    EKG, 12 LEAD, INITIAL    Collection Time: 10/14/21  2:37 PM   Result Value Ref Range    Ventricular Rate 69 BPM    Atrial Rate 69 BPM    P-R Interval 204 ms    QRS Duration 74 ms    Q-T Interval 388 ms    QTC Calculation (Bezet) 415 ms    Calculated P Axis 66 degrees    Calculated R Axis -12 degrees    Calculated T Axis 82 degrees    Diagnosis       Normal sinus rhythm with sinus arrhythmia  Normal ECG  No previous ECGs available  Confirmed by Noam Larsen ([id number]) on 10/14/2021 4:09:42 PM     CBC WITH AUTOMATED DIFF    Collection Time: 10/14/21  2:44 PM   Result Value Ref Range    WBC 4.8 4.3 - 11.1 K/uL    RBC 4.75 4.23 - 5.6 M/uL    HGB 15.2 13.6 - 17.2 g/dL    HCT 47.4 41.1 - 50.3 %    MCV 99.8 (H) 79.6 - 97.8 FL    MCH 32.0 26.1 - 32.9 PG    MCHC 32.1 31.4 - 35.0 g/dL    RDW 14.2 11.9 - 14.6 %    PLATELET 858 468 - 061 K/uL    MPV 11.1 9.4 - 12.3 FL    ABSOLUTE NRBC 0.00 0.0 - 0.2 K/uL    DF AUTOMATED      NEUTROPHILS 40 (L) 43 - 78 %    LYMPHOCYTES 39 13 - 44 %    MONOCYTES 18 (H) 4.0 - 12.0 %    EOSINOPHILS 2 0.5 - 7.8 %    BASOPHILS 1 0.0 - 2.0 %    IMMATURE GRANULOCYTES 0 0.0 - 5.0 %    ABS. NEUTROPHILS 1.9 1.7 - 8.2 K/UL    ABS. LYMPHOCYTES 1.9 0.5 - 4.6 K/UL    ABS. MONOCYTES 0.9 0.1 - 1.3 K/UL    ABS. EOSINOPHILS 0.1 0.0 - 0.8 K/UL    ABS. BASOPHILS 0.0 0.0 - 0.2 K/UL    ABS. IMM. GRANS. 0.0 0.0 - 0.5 K/UL   METABOLIC PANEL, COMPREHENSIVE    Collection Time: 10/14/21  2:44 PM   Result Value Ref Range    Sodium 138 136 - 145 mmol/L    Potassium 5.2 (H) 3.5 - 5.1 mmol/L    Chloride 105 98 - 107 mmol/L    CO2 26 21 - 32 mmol/L    Anion gap 7 7 - 16 mmol/L    Glucose 213 (H) 65 - 100 mg/dL    BUN 28 (H) 8 - 23 MG/DL    Creatinine 2.03 (H) 0.8 - 1.5 MG/DL    GFR est AA 41 (L) >60 ml/min/1.73m2    GFR est non-AA 34 (L) >60 ml/min/1.73m2    Calcium 8.7 8.3 - 10.4 MG/DL    Bilirubin, total 0.4 0.2 - 1.1 MG/DL    ALT (SGPT) 25 12 - 65 U/L    AST (SGOT) 39 (H) 15 - 37 U/L    Alk.  phosphatase 122 50 - 136 U/L    Protein, total 7.1 6.3 - 8.2 g/dL    Albumin 3.2 3.2 - 4.6 g/dL    Globulin 3.9 (H) 2.3 - 3.5 g/dL    A-G Ratio 0.8 (L) 1.2 - 3.5     MAGNESIUM Collection Time: 10/14/21  2:44 PM   Result Value Ref Range    Magnesium 2.3 1.8 - 2.4 mg/dL   TROPONIN-HIGH SENSITIVITY    Collection Time: 10/14/21  2:44 PM   Result Value Ref Range    Troponin-High Sensitivity 14.9 (H) 0 - 14 pg/mL   GLUCOSE, POC    Collection Time: 10/14/21  4:02 PM   Result Value Ref Range    Glucose (POC) 152 (H) 65 - 100 mg/dL    Performed by [employer]    GLUCOSE, POC    Collection Time: 10/14/21  7:17 PM   Result Value Ref Range    Glucose (POC) 237 (H) 65 - 100 mg/dL    Performed by [employer]    METABOLIC PANEL, BASIC    Collection Time: 10/15/21  3:35 AM   Result Value Ref Range    Sodium 142 136 - 145 mmol/L    Potassium 4.1 3.5 - 5.1 mmol/L    Chloride 109 (H) 98 - 107 mmol/L    CO2 27 21 - 32 mmol/L    Anion gap 6 (L) 7 - 16 mmol/L    Glucose 80 65 - 100 mg/dL    BUN 30 (H) 8 - 23 MG/DL    Creatinine 1.75 (H) 0.8 - 1.5 MG/DL    GFR est AA 49 (L) >60 ml/min/1.73m2    GFR est non-AA 40 (L) >60 ml/min/1.73m2    Calcium 8.6 8.3 - 10.4 MG/DL   CBC W/O DIFF    Collection Time: 10/15/21  3:35 AM   Result Value Ref Range    WBC 6.1 4.3 - 11.1 K/uL    RBC 4.29 4.23 - 5.6 M/uL    HGB 13.7 13.6 - 17.2 g/dL    HCT 42.1 41.1 - 50.3 %    MCV 98.1 (H) 79.6 - 97.8 FL    MCH 31.9 26.1 - 32.9 PG    MCHC 32.5 31.4 - 35.0 g/dL    RDW 14.1 11.9 - 14.6 %    PLATELET 163 710 - 619 K/uL    MPV 11.2 9.4 - 12.3 FL    ABSOLUTE NRBC 0.00 0.0 - 0.2 K/uL   GLUCOSE, POC    Collection Time: 10/15/21  6:18 AM   Result Value Ref Range    Glucose (POC) 94 65 - 100 mg/dL    Performed by Margo    ECHO ADULT COMPLETE    Collection Time: 10/15/21  9:37 AM   Result Value Ref Range    LV ED Vol A2C 91.00 cm3    LV ED Vol A4C 94.00 cm3    LV ES Vol A2C 32.00 cm3    LV ES Vol A4C 31.00 cm3    IVSd 1.02 (A) 0.60 - 1.00 cm    LVIDd 5.05 4.20 - 5.90 cm    LVIDs 2.99 cm    LVOT d 1.90 cm    LVOT VTI 23.30 cm    LVOT Peak Gradient 7.00 mmHg    LVPWd 0.98 0.60 - 1.00 cm    LV E' Lateral Velocity 9.46 cm/s LV E' Septal Velocity 8.97 cm/s    Left Atrium Minor Axis 2.54 cm    Left Atrium Major Axis 5.30 cm    LA Area 2C 16.50 cm2    LA Area 4C 18.50 cm2    Aortic Valve Systolic Mean Gradient 4.88 mmHg    AoV VTI 30.20 cm    Aortic Valve Systolic Peak Velocity 920.95 cm/s    AoV PG 10.00 mmHg    Aortic Valve Area by Continuity of VTI 2.19 cm2    Aortic Valve Area by Continuity of Peak Velocity 2.41 cm2    Ao Root D 3.20 cm    AO ASC D 3.40 cm    Mitral Valve E Wave Deceleration Time 201.00 ms    MV A Charli 109.00 cm/s    MV E Charli 91.30 cm/s    RVIDd 2.70 cm    Tapse 2.20 (A) 1.50 - 2.00 cm    MV E/A 0.84     LV Mass .0 88.0 - 224.0 g    LV Mass AL Index 88.5 49.0 - 115.0 g/m2    E/E' lateral 9.65     E/E' septal 10.18     E/E' ratio (averaged) 9.91     MARÍA/BSA Pk Charli 1.2 cm2/m2    MARÍA/BSA VTI 1.0 cm2/m2       All Micro Results     None          Other Studies:  CT HEAD WO CONT    Result Date: 10/14/2021  CT HEAD WITHOUT CONTRAST HISTORY:  Syncope. COMPARISON: None. TECHNIQUE: Axial imaging was performed without intravenous contrast utilizing 5mm slice thickness. Sagittal and coronal reformats were performed. Radiation dose reduction techniques were used for this study. Our CT scanner uses one or all of the following: Automated exposure control, adjustment of the MAS or KUB according to patient's size and iterative reconstruction. FINDINGS:    *BRAIN:    -  There are no early signs of territorial or lacunar infarction by CT.    -  No intracranial mass, hematoma, or hydrocephalus.    -  No gross white matter abnormality by CT. *VISUALIZED PARANASAL SINUSES: Please opacification of the right maxillary sinus. *MASTOIDS:  Clear. *CALVARIUM AND SCALP: Unremarkable. Unremarkable brain CT without intravenous contrast. chronic right maxillary sinusitis.  Date of Dictation: 10/14/2021 3:05 PM      XR CHEST PORT    Result Date: 10/14/2021  EXAM: XR CHEST PORT INDICATION: Syncope COMPARISON: 9/30/2016 FINDINGS: A portable AP radiograph of the chest was obtained at 1414 hours. The patient is on a cardiac monitor. The lungs are clear. The cardiac and mediastinal contours and pulmonary vascularity are normal.  The bones and soft tissues are grossly within normal limits.      Normal chest.      Current Meds:  Current Facility-Administered Medications   Medication Dose Route Frequency    sodium chloride (NS) flush 5-10 mL  5-10 mL IntraVENous Q8H    sodium chloride (NS) flush 5-10 mL  5-10 mL IntraVENous PRN    sodium chloride (NS) flush 5-40 mL  5-40 mL IntraVENous Q8H    sodium chloride (NS) flush 5-40 mL  5-40 mL IntraVENous PRN    acetaminophen (TYLENOL) tablet 650 mg  650 mg Oral Q6H PRN    Or    acetaminophen (TYLENOL) suppository 650 mg  650 mg Rectal Q6H PRN    polyethylene glycol (MIRALAX) packet 17 g  17 g Oral DAILY PRN    promethazine (PHENERGAN) tablet 12.5 mg  12.5 mg Oral Q6H PRN    Or    ondansetron (ZOFRAN) injection 4 mg  4 mg IntraVENous Q6H PRN    enoxaparin (LOVENOX) injection 40 mg  40 mg SubCUTAneous Q24H    aspirin delayed-release tablet 81 mg  81 mg Oral DAILY    dilTIAZem ER (CARDIZEM CD) capsule 120 mg  120 mg Oral DAILY    DULoxetine (CYMBALTA) capsule 60 mg  60 mg Oral BID    atorvastatin (LIPITOR) tablet 20 mg  20 mg Oral QHS    gabapentin (NEURONTIN) capsule 600 mg  600 mg Oral TID    tamsulosin (FLOMAX) capsule 0.8 mg  0.8 mg Oral QPM       Signed:  Brooklyn Cleary NP

## 2021-10-15 NOTE — PROGRESS NOTES
Admission assessment complete. Pt a/ox4 and resting in bed. Pt states he is not experiencing any dizziness at this time. No complaints of pain. Bilateral lung sounds coarse with inspiratory wheezing. 2 L O2 via nasal cannula. Pt says he is not currently having difficulty breathing. Tele box [id number]. S1S2 heart sounds auscultated. Bowel sounds active in all quadrants. Pt has not difficulty ambulating without assistance, instructed to call when getting up to prevent falls. Bed low and locked, side rails x3, gripper socks on, and call light in reach. Encouraged to call for help if needed and pt verbalized understanding.

## 2021-10-15 NOTE — PROGRESS NOTES
Problem: Mobility Impaired (Adult and Pediatric)  Goal: *Acute Goals and Plan of Care (Insert Text)  Outcome: Progressing Towards Goal  Note: STG:  (1.)Mr. Natanael Del Rosario will move from supine to sit and sit to supine  with SUPERVISION within 4-7 treatment day(s). (2.)Mr. Natanael Del Rosario will transfer from bed to chair and chair to bed with STAND BY ASSIST using the least restrictive device within 4-7 treatment day(s). (3.)Mr. Natanael Del Rosario will ambulate with STAND BY ASSIST for 300 feet with the least restrictive device within 4-7 treatment day(s). ________________________________________________________________________________________________      PHYSICAL THERAPY: Initial Assessment and AM 10/15/2021  OBSERVATION: PT Visit Days : 1  Payor: [place] Roman / Plan: [address] [place] [address] HMO / Product Type: Managed Care Medicare /       NAME/AGE/GENDER: Pastor Hill is a 64 y.o. male   PRIMARY DIAGNOSIS: Syncope and collapse [R55]  Hypoglycemia [E16.2]  Syncope [R55] Syncope and collapse Syncope and collapse        ICD-10: Treatment Diagnosis:    · Generalized Muscle Weakness (M62.81)  · Difficulty in walking, Not elsewhere classified (R26.2)   Precaution/Allergies:  Codeine sulfate      ASSESSMENT:     Mr. Natanael Del Rosario presents with generalized weakness and decreased independence with functional mobility. Pt will benefit from skilled PT interventions to maximize independence with functional mobility and strengthening. He lives at home with his wife and was independent without assistive devices prior to admission. He is admitted with above diagnosis, states he feels better today. He hopes to be able to go home soon. We worked on gait training today, we is weak and a little unsteady and had to hold to rail in the cavazos a couple of times. Would recommend HHPT on discharge from hospital for follow up. This section established at most recent assessment   PROBLEM LIST (Impairments causing functional limitations):  1.  Decreased Strength  2. Decreased ADL/Functional Activities  3. Decreased Transfer Abilities  4. Decreased Ambulation Ability/Technique  5. Decreased Balance   INTERVENTIONS PLANNED: (Benefits and precautions of physical therapy have been discussed with the patient.)  1. Balance Exercise  2. Bed Mobility  3. Gait Training  4. Therapeutic Activites  5. Therapeutic Exercise/Strengthening  6. Transfer Training     TREATMENT PLAN: Frequency/Duration: daily for duration of hospital stay  Rehabilitation Potential For Stated Goals: Good     REHAB RECOMMENDATIONS (at time of discharge pending progress):    Placement: It is my opinion, based on this patient's performance to date, that Mr. Liudmila Case may benefit from [address] after discharge due to the functional deficits listed above that are likely to improve with skilled rehabilitation because he/she has multiple medical issues that affect his/her functional mobility in the community. Equipment:    To be determined              HISTORY:   History of Present Injury/Illness (Reason for Referral):  Copied from MD note: Roberta Brito is a 64 y.o. male with medical history of pretension, hyperlipidemia, type 2 diabetes with polyneuropathy, CAD . CKD3 who presented to the ED from urgent care with hypoglycemic episode. Patient reports that he received COVID-19 booster on 10/12 and started to feel unwell on 10/13. He had 2 syncopal episodes with one episode positive for loss of consciousness for about 1 second. He fell to his left side injuring his arm. He unclear if he hit his head. Patient went to the urgent care today to be hypoglycemic with fingerstick sugar of 40. He was given oral glucose and candy with repeat glucose of 147 and sent to emergency room. Patient reports that he was in normal state of health, without any fever, chills, shortness of breath, chest pain,, abdominal pain. Denies any change in his diet or decreased p.o. intake.      In the ED, patient is hemodynamically stable and saturating well on room air. Fingerstick of 237 on arrival.  Laboratory work up is remarkable for potassium 5.2, BUN 28, creatinine 2.03. EKG with normal sinus rhythm with sinus arrhythmia. Past Medical History/Comorbidities:   Mr. Dodie Gardner  has a past medical history of Chronic airway obstruction, not elsewhere classified (7/1/2015), Essential hypertension, benign (7/1/2015), History of colonic polyps, Inflammatory and toxic neuropathy, unspecified (7/1/2015), Neoplasm of uncertain behavior of skin, Neuralgia, neuritis, and radiculitis, unspecified, Other and unspecified hyperlipidemia (7/1/2015), Type II or unspecified type diabetes mellitus without mention of complication, not stated as uncontrolled, Type II or unspecified type diabetes mellitus without mention of complication, uncontrolled (7/1/2015), and Ulcer of ankle ([facility] (7/1/2015). Mr. Dodie Gardner  has a past surgical history that includes hx shoulder replacement and hx colonoscopy (2007). Social History/Living Environment:   Home Environment: Private residence  One/Two Story Residence: One story  Living Alone: No  Support Systems: Spouse/Significant Other  Patient Expects to be Discharged to[de-identified] House  Current DME Used/Available at Home: None  Tub or Shower Type: Shower  Prior Level of Function/Work/Activity:  Pt lives at home with spouse, was independent with mobility without assistive devices     Number of Personal Factors/Comorbidities that affect the Plan of Care: 0: LOW COMPLEXITY   EXAMINATION:   Most Recent Physical Functioning:   Gross Assessment:  AROM: Generally decreased, functional  Strength: Generally decreased, functional               Posture:  Posture (WDL): Within defined limits  Balance:  Sitting: Intact  Standing: Impaired  Standing - Static: Good  Standing - Dynamic : Good;Fair;Occasional Bed Mobility:  Supine to Sit: Independent; Additional time  Wheelchair Mobility:     Transfers:  Sit to Stand: Stand-by assistance  Stand to Sit: Stand-by assistance  Gait:     Base of Support: Narrowed  Speed/Elissa: Pace decreased (<100 feet/min)  Step Length: Left shortened;Right shortened  Distance (ft): 150 Feet (ft)  Ambulation - Level of Assistance: Stand-by assistance;Contact guard assistance  Interventions: Safety awareness training;Verbal cues  Duration: 8 Minutes      Body Structures Involved:  1. Muscles Body Functions Affected:  1. Movement Related Activities and Participation Affected:  1. Mobility  2. Self Care   Number of elements that affect the Plan of Care: 4+: HIGH COMPLEXITY   CLINICAL PRESENTATION:   Presentation: Stable and uncomplicated: LOW COMPLEXITY   CLINICAL DECISION MAKIN[address] [place] [address] AM-PAC 6 Clicks   Basic Mobility Inpatient Short Form  How much difficulty does the patient currently have. .. Unable A Lot A Little None   1. Turning over in bed (including adjusting bedclothes, sheets and blankets)? [] 1   [] 2   [] 3   [x] 4   2. Sitting down on and standing up from a chair with arms ( e.g., wheelchair, bedside commode, etc.)   [] 1   [] 2   [] 3   [x] 4   3. Moving from lying on back to sitting on the side of the bed? [] 1   [] 2   [] 3   [x] 4   How much help from another person does the patient currently need. .. Total A Lot A Little None   4. Moving to and from a bed to a chair (including a wheelchair)? [] 1   [] 2   [] 3   [x] 4   5. Need to walk in hospital room? [] 1   [] 2   [x] 3   [] 4   6. Climbing 3-5 steps with a railing? [] 1   [] 2   [x] 3   [] 4   © , Trustees of [address] [place], [place] [address], under license to [employer]. All rights reserved      Score:  Initial: 22 Most Recent: X (Date: -- )    Interpretation of Tool:  Represents activities that are increasingly more difficult (i.e. Bed mobility, Transfers, Gait).     Medical Necessity:     · Patient is expected to demonstrate progress in   · strength and functional technique  ·  to   · decrease assistance required with functional mobility  · . Reason for Services/Other Comments:  · Patient continues to require skilled intervention due to   · Inability to complete functional mobility independently  · . Use of outcome tool(s) and clinical judgement create a POC that gives a: Clear prediction of patient's progress: LOW COMPLEXITY            TREATMENT:   (In addition to Assessment/Re-Assessment sessions the following treatments were rendered)   Pre-treatment Symptoms/Complaints:  none  Pain: Initial:   Pain Intensity 1: 0  Post Session: 0/10     Gait Training (8 Minutes):  Gait training to improve and/or restore physical functioning as related to mobility and strength. Ambulated 150 Feet (ft) with Stand-by assistance;Contact guard assistance    and minimal Safety awareness training;Verbal cues. Assessment    Braces/Orthotics/Lines/Etc:   · none  Treatment/Session Assessment:    · Response to Treatment:  pt did well  · Interdisciplinary Collaboration:   o Registered Nurse  · After treatment position/precautions:   o Up in chair  o Bed/Chair-wheels locked  o Call light within reach   · Compliance with Program/Exercises: Compliant all of the time, Will assess as treatment progresses  · Recommendations/Intent for next treatment session: \"Next visit will focus on advancements to more challenging activities and reduction in assistance provided\".   Total Treatment Duration:  PT Patient Time In/Time Out  Time In: 1145  Time Out: 129 Juwan Jaime, PT

## 2021-10-15 NOTE — PROGRESS NOTES
Care Management Interventions  PCP Verified by CM: Yes  Mode of Transport at Discharge: Self  Transition of Care Consult (CM Consult): Discharge Planning  Support Systems: Child(leo)  Confirm Follow Up Transport: Family  The Patient and/or Patient Representative was Provided with a Choice of Provider and Agrees with the Discharge Plan?: Yes  Freedom of Choice List was Provided with Basic Dialogue that Supports the Patient's Individualized Plan of Care/Goals, Treatment Preferences and Shares the Quality Data Associated with the Providers?: Yes  Discharge Location  Discharge Placement: Home with family assistance   Patient signed [address] letter.

## 2021-10-15 NOTE — PROGRESS NOTES
10/14/21 2115   Dual Skin Pressure Injury Assessment   Dual Skin Pressure Injury Assessment WDL   Second Care Provider (Based on [address] [place] [address]) Shaka Peace RN   Skin Integumentary   Skin Integumentary (WDL) WDL    Pressure  Injury Documentation No Pressure Injury Noted-Pressure Ulcer Prevention Initiated

## 2021-10-16 ENCOUNTER — HOME HEALTH ADMISSION (OUTPATIENT)
Dept: HOME HEALTH SERVICES | Facility: HOME HEALTH | Age: 76
End: 2021-10-16

## 2021-10-16 VITALS
HEIGHT: 69 IN | RESPIRATION RATE: 20 BRPM | WEIGHT: 206 LBS | SYSTOLIC BLOOD PRESSURE: 132 MMHG | TEMPERATURE: 97.7 F | BODY MASS INDEX: 30.51 KG/M2 | HEART RATE: 83 BPM | DIASTOLIC BLOOD PRESSURE: 81 MMHG | OXYGEN SATURATION: 91 %

## 2021-10-16 LAB
ANION GAP SERPL CALC-SCNC: 7 MMOL/L (ref 7–16)
BUN SERPL-MCNC: 34 MG/DL (ref 8–23)
CALCIUM SERPL-MCNC: 8.6 MG/DL (ref 8.3–10.4)
CHLORIDE SERPL-SCNC: 106 MMOL/L (ref 98–107)
CO2 SERPL-SCNC: 27 MMOL/L (ref 21–32)
CREAT SERPL-MCNC: 1.9 MG/DL (ref 0.8–1.5)
ERYTHROCYTE [DISTWIDTH] IN BLOOD BY AUTOMATED COUNT: 13.9 % (ref 11.9–14.6)
GLUCOSE BLD STRIP.AUTO-MCNC: 104 MG/DL (ref 65–100)
GLUCOSE SERPL-MCNC: 110 MG/DL (ref 65–100)
HCT VFR BLD AUTO: 41 % (ref 41.1–50.3)
HGB BLD-MCNC: 13.3 G/DL (ref 13.6–17.2)
MCH RBC QN AUTO: 32.2 PG (ref 26.1–32.9)
MCHC RBC AUTO-ENTMCNC: 32.4 G/DL (ref 31.4–35)
MCV RBC AUTO: 99.3 FL (ref 79.6–97.8)
NRBC # BLD: 0 K/UL (ref 0–0.2)
PLATELET # BLD AUTO: 178 K/UL (ref 150–450)
PMV BLD AUTO: 11.5 FL (ref 9.4–12.3)
POTASSIUM SERPL-SCNC: 4.1 MMOL/L (ref 3.5–5.1)
RBC # BLD AUTO: 4.13 M/UL (ref 4.23–5.6)
SERVICE CMNT-IMP: ABNORMAL
SODIUM SERPL-SCNC: 140 MMOL/L (ref 136–145)
WBC # BLD AUTO: 8.6 K/UL (ref 4.3–11.1)

## 2021-10-16 PROCEDURE — 80048 BASIC METABOLIC PNL TOTAL CA: CPT

## 2021-10-16 PROCEDURE — 82962 GLUCOSE BLOOD TEST: CPT

## 2021-10-16 PROCEDURE — 97116 GAIT TRAINING THERAPY: CPT

## 2021-10-16 PROCEDURE — 2709999900 HC NON-CHARGEABLE SUPPLY

## 2021-10-16 PROCEDURE — 36415 COLL VENOUS BLD VENIPUNCTURE: CPT

## 2021-10-16 PROCEDURE — 74011250637 HC RX REV CODE- 250/637: Performed by: INTERNAL MEDICINE

## 2021-10-16 PROCEDURE — 85027 COMPLETE CBC AUTOMATED: CPT

## 2021-10-16 PROCEDURE — 99218 HC RM OBSERVATION: CPT

## 2021-10-16 RX ADMIN — DILTIAZEM HYDROCHLORIDE 120 MG: 120 CAPSULE, COATED, EXTENDED RELEASE ORAL at 09:26

## 2021-10-16 RX ADMIN — ASPIRIN 81 MG: 81 TABLET, COATED ORAL at 09:26

## 2021-10-16 RX ADMIN — GABAPENTIN 600 MG: 300 CAPSULE ORAL at 09:26

## 2021-10-16 RX ADMIN — DULOXETINE HYDROCHLORIDE 60 MG: 60 CAPSULE, DELAYED RELEASE ORAL at 09:26

## 2021-10-16 NOTE — PROGRESS NOTES
Care Management Interventions  PCP Verified by CM: Yes  Mode of Transport at Discharge: Self  Transition of Care Consult (CM Consult): [address]: Yes  Support Systems: Spouse/Significant Other  Confirm Follow Up Transport: Family  The Plan for Transition of Care is Related to the Following Treatment Goals :  Increase strength  The Patient and/or Patient Representative was Provided with a Choice of Provider and Agrees with the Discharge Plan?: Yes  Freedom of Choice List was Provided with Basic Dialogue that Supports the Patient's Individualized Plan of Care/Goals, Treatment Preferences and Shares the Quality Data Associated with the Providers?: Yes  Discharge Location  Discharge Placement: Home with home health

## 2021-10-16 NOTE — DISCHARGE INSTRUCTIONS
Patient Education        Learning About Type 2 Diabetes  What is type 2 diabetes? Type 2 diabetes is a condition in which you have too much sugar (glucose) in your blood. Glucose is a type of sugar produced in your body when carbohydrates and other foods are digested. It provides energy to cells throughout the body. Normally, blood sugar levels increase after you eat a meal. When blood sugar rises, cells in the pancreas release insulin, which causes the body to absorb sugar from the blood and lowers the blood sugar level to normal.  When you have type 2 diabetes, sugar stays in the blood rather than entering the body's cells to be used for energy. This results in high blood sugar. It happens when your body can't use insulin the right way. Over time, high blood sugar can harm many parts of the body, such as your eyes, heart, blood vessels, nerves, and kidneys. It can also increase your risk for other health problems (complications). What can you expect with type 2 diabetes? Jeffrey Hoover keep hearing about how important it is to keep your blood sugar within a target range. That's because over time, high blood sugar can lead to serious problems. It can:  · Harm your eyes, nerves, and kidneys. · Damage your blood vessels, leading to heart disease and stroke. · Reduce blood flow and cause nerve damage to parts of your body, especially your feet. This can cause slow healing and pain when you walk. · Make your immune system weak and less able to fight infections. When people hear the word \"diabetes,\" they often think of problems like these. But daily care and treatment can help prevent or delay these problems. The goal is to keep your blood sugar in a target range. That's the best way to reduce your chance of having more problems from diabetes. What are the symptoms? Some people who have type 2 diabetes may not have any symptoms early on.  Many people with the disease don't even know they have it at first. But with time, diabetes starts to cause symptoms. You have most symptoms of type 2 diabetes when your blood sugar is either too high or too low. The most common symptoms of high blood sugar include:  · Thirst.  · Needing to urinate often. · Weight loss. · Blurry vision. The symptoms of low blood sugar include:  · Sweating. · Shakiness. · Weakness. · Hunger. · Confusion. You're not likely to get symptoms of low blood sugar unless you take insulin or use certain diabetes medicines that lower blood sugar. How can you help prevent type 2 diabetes? There are things you can do to help prevent type 2 diabetes. Stay at a healthy weight. Exercise regularly, and eat healthy foods. Even small changes can make a difference. If you have prediabetes, the medicine metformin can help prevent type 2 diabetes. How is type 2 diabetes treated? Treatment for type 2 diabetes will change over time to meet your needs. But the focus of your treatment will usually be to keep your blood sugar levels in your target range. This will help prevent problems such as eye, kidney, heart, blood vessel, and nerve disease. Some people may need medicines to help their bodies make insulin or decrease insulin resistance. Some medicines slow down how quickly the body absorbs carbohydrates. Treatment to manage type 2 diabetes includes:  · Making healthy food choices and being active. · Losing weight, if you need to. · Seeing your doctor regularly. · Keeping your blood sugar in your target range. · Taking medicines, if you need them. · Quitting smoking, if you smoke. · Keeping your blood pressure and cholesterol under control. Follow-up care is a key part of your treatment and safety. Be sure to make and go to all appointments, and call your doctor if you are having problems. It's also a good idea to know your test results and keep a list of the medicines you take. Where can you learn more?   Go to http://www.gray.com/  Enter [id number] in the search box to learn more about \"Learning About Type 2 Diabetes. \"  Current as of: August 31, 2020               Content Version: 13.0  © 1261-7038 Healthwise, [facility]. Care instructions adapted under license by [employer]* (which disclaims liability or warranty for this information). If you have questions about a medical condition or this instruction, always ask your healthcare professional. [place] [address] any warranty or liability for your use of this information.

## 2021-10-16 NOTE — PROGRESS NOTES
Problem: Mobility Impaired (Adult and Pediatric)  Goal: *Acute Goals and Plan of Care (Insert Text)  Outcome: Progressing Towards Goal  Note: STG:  (1.)Mr. Dung Almonte will move from supine to sit and sit to supine  with SUPERVISION within 4-7 treatment day(s). (2.)Mr. Dung Almonte will transfer from bed to chair and chair to bed with STAND BY ASSIST using the least restrictive device within 4-7 treatment day(s). (3.)Mr. Dung Almonte will ambulate with STAND BY ASSIST for 300 feet with the least restrictive device within 4-7 treatment day(s). ________________________________________________________________________________________________      PHYSICAL THERAPY: Daily Note and AM 10/16/2021  OBSERVATION: PT Visit Days : 1  Payor: Raimrez Mccartney / Plan: [address] [place] [address] HMO / Product Type: Managed Care Medicare /       NAME/AGE/GENDER: Payam Delgado is a 64 y.o. male   PRIMARY DIAGNOSIS: Syncope and collapse [R55]  Hypoglycemia [E16.2]  Syncope [R55] Syncope and collapse Syncope and collapse       ICD-10: Treatment Diagnosis:    · Generalized Muscle Weakness (M62.81)  · Difficulty in walking, Not elsewhere classified (R26.2)   Precaution/Allergies:  Codeine sulfate      ASSESSMENT:     Mr. Dung Almonte presents with generalized weakness and decreased independence with functional mobility. Pt will benefit from skilled PT interventions to maximize independence with functional mobility and strengthening. He lives at home with his wife and was independent without assistive devices prior to admission. He is admitted with above diagnosis, states he feels better today. He hopes to be able to go home soon. We worked on gait training today, we is weak and a little unsteady and had to hold to rail in the cavazos a couple of times.  Would recommend HHPT on discharge from hospital for follow up.   10/16/2021: Patient actively participated with PT during AM session including bed mobility, transfers, and gait training (270 feet with supervision without assistive device). Patient only reported minimal shortness of breath after ambulation. Patient sitting edge of bed eating breakfast at end of session. This section established at most recent assessment   PROBLEM LIST (Impairments causing functional limitations):  1. Decreased Strength  2. Decreased ADL/Functional Activities  3. Decreased Transfer Abilities  4. Decreased Ambulation Ability/Technique  5. Decreased Balance   INTERVENTIONS PLANNED: (Benefits and precautions of physical therapy have been discussed with the patient.)  1. Balance Exercise  2. Bed Mobility  3. Gait Training  4. Therapeutic Activites  5. Therapeutic Exercise/Strengthening  6. Transfer Training     TREATMENT PLAN: Frequency/Duration: daily for duration of hospital stay  Rehabilitation Potential For Stated Goals: Good     REHAB RECOMMENDATIONS (at time of discharge pending progress):    Placement: It is my opinion, based on this patient's performance to date, that Mr. Imagene Mcardle may benefit from [address] after discharge due to the functional deficits listed above that are likely to improve with skilled rehabilitation because he/she has multiple medical issues that affect his/her functional mobility in the community. Equipment:    To be determined              HISTORY:   History of Present Injury/Illness (Reason for Referral):  Copied from MD note: Danial Schneider is a 64 y.o. male with medical history of pretension, hyperlipidemia, type 2 diabetes with polyneuropathy, CAD . CKD3 who presented to the ED from urgent care with hypoglycemic episode. Patient reports that he received COVID-19 booster on 10/12 and started to feel unwell on 10/13. He had 2 syncopal episodes with one episode positive for loss of consciousness for about 1 second. He fell to his left side injuring his arm. He unclear if he hit his head. Patient went to the urgent care today to be hypoglycemic with fingerstick sugar of 40.   He was given oral glucose and candy with repeat glucose of 147 and sent to emergency room. Patient reports that he was in normal state of health, without any fever, chills, shortness of breath, chest pain,, abdominal pain. Denies any change in his diet or decreased p.o. intake. In the ED, patient is hemodynamically stable and saturating well on room air. Fingerstick of 237 on arrival.  Laboratory work up is remarkable for potassium 5.2, BUN 28, creatinine 2.03. EKG with normal sinus rhythm with sinus arrhythmia. Past Medical History/Comorbidities:   Mr. 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 Mr. Brie Bar  has a past surgical history that includes hx shoulder replacement and hx colonoscopy (2007).   Social History/Living Environment:   Home Environment: Private residence  One/Two Story Residence: One story  Living Alone: No  Support Systems: Child(leo)  Patient Expects to be Discharged to[de-identified] House  Current DME Used/Available at Home: None  Tub or Shower Type: Shower  Prior Level of Function/Work/Activity:  Pt lives at home with spouse, was independent with mobility without assistive devices     Number of Personal Factors/Comorbidities that affect the Plan of Care: 0: LOW COMPLEXITY   EXAMINATION:   Most Recent Physical Functioning:   Gross Assessment:  AROM: Generally decreased, functional  PROM: Generally decreased, functional  Strength: Generally decreased, functional  Coordination: Within functional limits  Tone: Normal  Sensation: Intact             Posture:  Posture Assessment: Rounded shoulders, Forward head  Balance:  Sitting: Intact  Standing: Intact  Standing - Static: Good  Standing - Dynamic : Good Bed Mobility:  Rolling: Independent  Supine to Sit: Independent  Sit to Supine: Independent  Scooting: Independent  Wheelchair Mobility:  N/A    Transfers:  Sit to Stand: Supervision  Stand to Sit: Supervision  Bed to Chair: Supervision  Gait:  Right Side Weight Bearing: Full  Left Side Weight Bearing: Full  Base of Support: Narrowed  Speed/Elissa: Pace decreased (<100 feet/min)  Step Length: Left shortened;Right shortened  Distance (ft): 270 Feet (ft)  Ambulation - Level of Assistance: Supervision  Interventions: Safety awareness training;Verbal cues  Duration: 15 Minutes      Body Structures Involved:  1. Muscles Body Functions Affected:  1. Movement Related Activities and Participation Affected:  1. Mobility  2. Self Care   Number of elements that affect the Plan of Care: 4+: HIGH COMPLEXITY   CLINICAL PRESENTATION:   Presentation: Stable and uncomplicated: LOW COMPLEXITY   CLINICAL DECISION MAKIN [facility] [address] [id number] AM-PAC 6 Clicks   Basic Mobility Inpatient Short Form  How much difficulty does the patient currently have. .. Unable A Lot A Little None   1. Turning over in bed (including adjusting bedclothes, sheets and blankets)? [] 1   [] 2   [] 3   [x] 4   2. Sitting down on and standing up from a chair with arms ( e.g., wheelchair, bedside commode, etc.)   [] 1   [] 2   [] 3   [x] 4   3. Moving from lying on back to sitting on the side of the bed? [] 1   [] 2   [] 3   [x] 4   How much help from another person does the patient currently need. .. Total A Lot A Little None   4. Moving to and from a bed to a chair (including a wheelchair)? [] 1   [] 2   [] 3   [x] 4   5. Need to walk in hospital room?    [] 1   [] 2   [x] 3   [] 4   6.  Climbing 3-5 steps with a railing? [] 1   [] 2   [x] 3   [] 4   © 2007, Trustees of [facility] [employer], under license to [employer]. All rights reserved      Score:  Initial: 22 Most Recent: X (Date: -- )    Interpretation of Tool:  Represents activities that are increasingly more difficult (i.e. Bed mobility, Transfers, Gait). Medical Necessity:     · Patient is expected to demonstrate progress in strength and functional technique to decrease assistance required with functional mobility. Reason for Services/Other Comments:  · Patient continues to require skilled intervention due to Inability to complete functional mobility independently. Use of outcome tool(s) and clinical judgement create a POC that gives a: Clear prediction of patient's progress: LOW COMPLEXITY            TREATMENT:   (In addition to Assessment/Re-Assessment sessions the following treatments were rendered)   Pre-treatment Symptoms/Complaints:  10/16/2021: Patient reports he is ready to go home. Pain: Initial:   Pain Intensity 1: 0  Post Session: 0/10     Gait Training (15 Minutes):  Gait training to improve and/or restore physical functioning as related to mobility and strength. Ambulated 270 Feet (ft) with Supervision    and minimal Safety awareness training;Verbal cues. Braces/Orthotics/Lines/Etc:   · none  Treatment/Session Assessment:    · Response to Treatment:  Patient tolerated treatment well with minimal complaints of shortness of breath at end of gait training, but was still able to converse freely with PT.    · Interdisciplinary Collaboration:   o Registered Nurse  · After treatment position/precautions:   o Up in chair  o Bed/Chair-wheels locked  o Call light within reach   · Compliance with Program/Exercises: Compliant all of the time, Will assess as treatment progresses  · Recommendations/Intent for next treatment session:   \"Next visit will focus on advancements to more challenging activities and reduction in assistance provided\".   Total Treatment Duration:  PT Patient Time In/Time Out  Time In: 0800  Time Out: [id number] [address] [facility],[address], PT

## 2021-10-16 NOTE — DISCHARGE SUMMARY
Hospitalist Discharge Summary   Admit Date:  10/14/2021  2:03 PM   DC Note date: 10/16/2021  Name:  Collette Goodnight   Age:  64 y.o. Sex:  male  :  1945   MRN:  [id number]   Room:  [facility]  PCP:  Huma Goodson MD    Presenting Complaint: Syncope and Low Blood Sugar    Initial Admission Diagnosis: Syncope and collapse [R55]  Hypoglycemia [E16.2]  Syncope [R55]     Problem List for this Hospitalization:  Hospital Problems as of 10/16/2021 Date Reviewed: 2021        Codes Class Noted - Resolved POA    Syncope ICD-10-CM: R55  ICD-9-CM: 780.2  10/15/2021 - Present Unknown        * (Principal) Syncope and collapse ICD-10-CM: R55  ICD-9-CM: 780.2  10/14/2021 - Present Yes        Hypoglycemia ICD-10-CM: E16.2  ICD-9-CM: 251.2  10/14/2021 - Present Yes        CKD (chronic kidney disease) stage 3, GFR 30-59 ml/min (HCC) ICD-10-CM: N18.30  ICD-9-CM: 585.3  2021 - Present Yes        Coronary artery disease involving native coronary artery of native heart without angina pectoris (Chronic) ICD-10-CM: I25.10  ICD-9-CM: 414.01  2017 - Present Yes        Tobacco abuse (Chronic) ICD-10-CM: Z72.0  ICD-9-CM: 305.1  2017 - Present Yes        Essential hypertension, benign ICD-10-CM: I10  ICD-9-CM: 401.1  2015 - Present Yes        Type 2 diabetes mellitus with diabetic polyneuropathy, without long-term current use of insulin ([facility] ICD-10-CM: E11.42  ICD-9-CM: 250.60, 357.2  2015 - Present Yes        COPD (chronic obstructive pulmonary disease) ([facility] ICD-10-CM: J44.9  ICD-9-CM: 268  2015 - Present Yes            Did Patient have Sepsis (YES OR NO): No      Hospital Course:  Mr. Jose Raul Ross is a 64 y. o. male with medical history of HTN, hyperlipidemia, type 2 diabetes with polyneuropathy, CAD, and  CKD3 who presented to the ER from urgent care with hypoglycemic episode. Jordan Fields reports that he received COVID-19 booster on 10/12 and started to feel unwell on 10/13. [facility] had 2 syncopal episodes with one episode positive for loss of consciousness for about 1 second.  He fell to his left side injuring his arm.  He was unclear if he hit his head.  Patient went to the urgent care and was found to be hypoglycemic with fingerstick sugar of 40.  He was given oral glucose and candy with repeat glucose of 147 and sent to the ER. CT head, TTE, and US carotid w/o acute findings. Mr. Parker Goodson will stop his sulfonylurea and pioglitizone and will follow up with his PCP within 3 days of discharge so they can review his oral diabetic medication regimen (he will continue Januvia). Mr. Parker Goodson has no complaints and states he is ready to go home. PT did recommend HHPT. Mr. Parker Goodson understands his results, states he will follow up with his PCP next week, and he is appropriate for discharge home on Oct/16/2021. Syncope and collapse (10/14/2021)  CT head no abnormalities  TTE and US carotid reviewed; no acute significant finding      Hypoglycemia (10/14/2021)  No further episodes  See PCP post discharge       Type 2 diabetes mellitus with diabetic polyneuropathy, without long-term current use of insulin ([facility] (7/1/2015)  SSI  Hold oral agents, seen by diabetic educator and will stop Glipizide until seen by PCP outpatient. A1C 6.8 in September       COPD (chronic obstructive pulmonary disease) ([facility] (7/1/2015)  No exacerbation, nebs prn       Coronary artery disease involving native coronary artery of native heart without angina pectoris (5/25/2017)  Cont home medications.       CKD (chronic kidney disease) stage 3, GFR 30-59 ml/min ([facility]) (6/21/2021)  Cr of 2.03 on admission with last Cr of 1.93 on 9/22. It appears that CKD has been progressively getting worse. D/c Actos as some studies have shown that it can cause CKD.   Renal function at baseline at discharge       Essential hypertension, benign (7/1/2015)  Controlled; continue Cardizem         Disposition: Home or Self Care  Diet: ADULT DIET Regular; 4 carb choices (60 gm/meal)  Code Status: Full Code    Follow Up Orders: Follow-up Appointments   Procedures    FOLLOW UP VISIT Appointment in: Other (Specify)     Standing Status:   Standing     Number of Occurrences:   1     Order Specific Question:   Appointment in     Answer: Other (Specify)       Follow-up Information     Follow up With Specialties Details Why Contact Info    Lashon Aggarwal MD Family Medicine In 3 days post hospital discharge check up and diabetic medication regimen review 2 [address]ovany [address]ila [address]  [phone]            Time spent in patient discharge and coordination 32 minutes. Plan was discussed with Mr. Candelariodanielle Brandon. All questions answered. Patient was stable at time of discharge. Instructions given to call a physician or return if any concerns. Discharge Info:   Current Discharge Medication List      CONTINUE these medications which have NOT CHANGED    Details   DULoxetine (CYMBALTA) 60 mg capsule TAKE ONE CAPSULE BY MOUTH TWICE A DAY  Qty: 180 Capsule, Refills: 3    Associated Diagnoses: Chronic pain syndrome      dilTIAZem ER (CARDIZEM CD) 120 mg capsule Take 1 Capsule by mouth daily. Qty: 90 Capsule, Refills: 3      SITagliptin (JANUVIA) 50 mg tablet Take 1 Tab by mouth daily. Qty: 90 Tab, Refills: 3      gabapentin (NEURONTIN) 600 mg tablet TAKE ONE TABLET BY MOUTH THREE TIMES A DAY  Qty: 270 Tab, Refills: 3      tamsulosin (FLOMAX) 0.4 mg capsule TAKE 2 CAPSULES ONCE A DAY      AFTER EVENING MEAL  Qty: 180 Cap, Refills: 3    Associated Diagnoses: Hypertrophy of prostate with urinary obstruction      atorvastatin (LIPITOR) 20 mg tablet TAKE ONE TABLET BY MOUTH ONCE DAILY  Qty: 90 Tab, Refills: 3      aspirin delayed-release 81 mg tablet Take  by mouth daily. melatonin 3 mg tablet Take  by mouth. zinc 50 mg tab tablet Take  by mouth daily. budesonide-formoteroL (SYMBICORT) 80-4.5 mcg/actuation HFAA Take 2 Puffs by inhalation two (2) times a day.   Qty: 1 Inhaler, Refills: 5         STOP taking these medications       glipiZIDE (GLUCOTROL) 10 mg tablet Comments:   Reason for Stopping:         valACYclovir (VALTREX) 1 gram tablet Comments:   Reason for Stopping:         pioglitazone (Actos) 15 mg tablet Comments:   Reason for Stopping:         OTHER Comments:   Reason for Stopping:               Procedures done this admission:  * No surgery found *    Consults this admission:  None    Echocardiogram/EKG results:  Results from Hospital Encounter encounter on 10/14/21    ECHO ADULT COMPLETE    Interpretation Summary  · Contrast used: DEFINITY. · LV: Estimated LVEF is 60 - 65%. Normal cavity size, wall thickness and systolic function (ejection fraction normal). Wall motion: normal. Mild (grade 1) left ventricular diastolic dysfunction E/O'=34.  · No pericardial effusion    -Sinus rhythm noted during the study with heart rates in the 70s. -Preserved LV systolic function with an EF of 60 to 65% and no regional wall motion abnormalities. No pericardial effusion noted. EKG Results     Procedure 720 Value Units Date/Time    EKG [[id number]] Collected: 10/14/21 1437    Order Status: Completed Updated: 10/14/21 1610     Ventricular Rate 69 BPM      Atrial Rate 69 BPM      P-R Interval 204 ms      QRS Duration 74 ms      Q-T Interval 388 ms      QTC Calculation (Bezet) 415 ms      Calculated P Axis 66 degrees      Calculated R Axis -12 degrees      Calculated T Axis 82 degrees      Diagnosis --     Normal sinus rhythm with sinus arrhythmia  Normal ECG  No previous ECGs available  Confirmed by Shabnam Rosales ([id number]) on 10/14/2021 4:09:42 PM            Diagnostic Imaging/Tests:   CT HEAD WO CONT    Result Date: 10/14/2021  CT HEAD WITHOUT CONTRAST HISTORY:  Syncope. COMPARISON: None. TECHNIQUE: Axial imaging was performed without intravenous contrast utilizing 5mm slice thickness. Sagittal and coronal reformats were performed.  Radiation dose reduction techniques were used for this study. Our CT scanner uses one or all of the following: Automated exposure control, adjustment of the MAS or KUB according to patient's size and iterative reconstruction. FINDINGS:    *BRAIN:    -  There are no early signs of territorial or lacunar infarction by CT.    -  No intracranial mass, hematoma, or hydrocephalus.    -  No gross white matter abnormality by CT. *VISUALIZED PARANASAL SINUSES: Please opacification of the right maxillary sinus. *MASTOIDS:  Clear. *CALVARIUM AND SCALP: Unremarkable. Unremarkable brain CT without intravenous contrast. chronic right maxillary sinusitis. Date of Dictation: 10/14/2021 3:05 PM      XR CHEST PORT    Result Date: 10/14/2021  EXAM: XR CHEST PORT INDICATION: Syncope COMPARISON: 9/30/2016 FINDINGS: A portable AP radiograph of the chest was obtained at 1414 hours. The patient is on a cardiac monitor. The lungs are clear. The cardiac and mediastinal contours and pulmonary vascularity are normal.  The bones and soft tissues are grossly within normal limits. Normal chest.    DUPLEX CAROTID BILATERAL    Result Date: 10/15/2021  History: Syncope Sonographic evaluation of the carotid arteries was performed bilaterally. PSV and EDV criteria utilized for carotid artery stenosis measurements Grayscale imaging on the right demonstrates mild plaque disease at the carotid bulb. The velocities and ratios are unremarkable. The right vertebral artery demonstrates antegrade flow without evidence of steal. Grayscale imaging on the left demonstrates mild plaque disease at the carotid bulb. The velocity at the internal carotid artery on the left is mildly elevated at 137 cm/s systolic. The ratio of ICA to CCA is normal at 1.2. Elevated velocities with spectral broadening is noted in the left vertebral artery     1. Less than 50% stenosis of the right internal carotid artery. 2. Less than 50% stenosis of the left internal carotid artery.  3. Left vertebral artery stenosis ECHO ADULT COMPLETE    Result Date: 10/15/2021  · Contrast used: DEFINITY. · LV: Estimated LVEF is 60 - 65%. Normal cavity size, wall thickness and systolic function (ejection fraction normal). Wall motion: normal. Mild (grade 1) left ventricular diastolic dysfunction K/B'=51. · No pericardial effusion  -Sinus rhythm noted during the study with heart rates in the 70s. -Preserved LV systolic function with an EF of 60 to 65% and no regional wall motion abnormalities. No pericardial effusion noted.        All Micro Results     None          Labs: Results:       BMP, Mg, Phos Recent Labs     10/16/21  0434 10/15/21  0335 10/14/21  1444    142 138   K 4.1 4.1 5.2*    109* 105   CO2 27 27 26   AGAP 7 6* 7   BUN 34* 30* 28*   CREA 1.90* 1.75* 2.03*   CA 8.6 8.6 8.7   * 80 213*   MG  --   --  2.3      CBC Recent Labs     10/16/21  0434 10/15/21  0335 10/14/21  1444   WBC 8.6 6.1 4.8   RBC 4.13* 4.29 4.75   HGB 13.3* 13.7 15.2   HCT 41.0* 42.1 47.4    176 168   GRANS  --   --  40*   LYMPH  --   --  39   EOS  --   --  2   MONOS  --   --  18*   BASOS  --   --  1   IG  --   --  0   ANEU  --   --  1.9   ABL  --   --  1.9   GT  --   --  0.1   ABM  --   --  0.9   ABB  --   --  0.0   AIG  --   --  0.0      LFT Recent Labs     10/14/21  1444   ALT 25      TP 7.1   ALB 3.2   GLOB 3.9*   AGRAT 0.8*      Cardiac Testing No results found for: BNPP, BNP, CPK, RCK1, RCK2, RCK3, RCK4, CKMB, CKNDX, CKND1, TROPT, TROIQ   Coagulation Tests No results found for: PTP, INR, APTT, INREXT   A1c Lab Results   Component Value Date/Time    Hemoglobin A1c 6.8 (H) 09/22/2021 11:30 AM    Hemoglobin A1c 9.3 (H) 06/07/2021 10:20 AM    Hemoglobin A1c 6.9 (H) 01/11/2021 03:38 PM    Hemoglobin A1c, External 7.1 02/09/2015 12:00 AM      Lipid Panel Lab Results   Component Value Date/Time    Cholesterol, total 125 09/20/2021 10:20 AM    HDL Cholesterol 42 09/20/2021 10:20 AM    LDL, calculated 65 09/20/2021 10:20 AM    LDL, calculated 50 07/22/2020 11:37 AM    VLDL, calculated 18 09/20/2021 10:20 AM    VLDL, calculated 34 07/22/2020 11:37 AM    Triglyceride 98 09/20/2021 10:20 AM      Thyroid Panel Lab Results   Component Value Date/Time    TSH 1.550 01/11/2021 03:38 PM        Most Recent UA No results found for: COLOR, APPRN, REFSG, ALBERTO, PROTU, GLUCU, KETU, BILU, BLDU, UROU, OSVALDO, LEUKU, WBCU, RBCU, UEPI, BACTU, CASTS, UCRY, MUCUS, UCOM       All Labs from Last 24 Hrs:  Recent Results (from the past 24 hour(s))   ECHO ADULT COMPLETE    Collection Time: 10/15/21  9:37 AM   Result Value Ref Range    LV ED Vol A2C 91.00 cm3    LV ED Vol A4C 94.00 cm3    LV ES Vol A2C 32.00 cm3    LV ES Vol A4C 31.00 cm3    IVSd 1.02 (A) 0.60 - 1.00 cm    LVIDd 5.05 4.20 - 5.90 cm    LVIDs 2.99 cm    LVOT d 1.90 cm    LVOT VTI 23.30 cm    LVOT Peak Gradient 7.00 mmHg    LVPWd 0.98 0.60 - 1.00 cm    LV E' Lateral Velocity 9.46 cm/s    LV E' Septal Velocity 8.97 cm/s    Left Atrium Minor Axis 2.54 cm    Left Atrium Major Axis 5.30 cm    LA Area 2C 16.50 cm2    LA Area 4C 18.50 cm2    Aortic Valve Systolic Mean Gradient 8.72 mmHg    AoV VTI 30.20 cm    Aortic Valve Systolic Peak Velocity 307.61 cm/s    AoV PG 10.00 mmHg    Aortic Valve Area by Continuity of VTI 2.19 cm2    Aortic Valve Area by Continuity of Peak Velocity 2.41 cm2    Ao Root D 3.20 cm    AO ASC D 3.40 cm    Mitral Valve E Wave Deceleration Time 201.00 ms    MV A Charli 109.00 cm/s    MV E Charli 91.30 cm/s    RVIDd 2.70 cm    Tapse 2.20 (A) 1.50 - 2.00 cm    MV E/A 0.84     LV Mass .0 88.0 - 224.0 g    LV Mass AL Index 88.5 49.0 - 115.0 g/m2    E/E' lateral 9.65     E/E' septal 10.18     E/E' ratio (averaged) 9.91     MARÍA/BSA Pk Charli 1.2 cm2/m2    MARÍA/BSA VTI 1.0 cm2/m2   GLUCOSE, POC    Collection Time: 10/15/21 12:19 PM   Result Value Ref Range    Glucose (POC) 152 (H) 65 - 100 mg/dL    Performed by Priya    GLUCOSE, POC    Collection Time: 10/15/21  4:36 PM   Result Value Ref Range Glucose (POC) 121 (H) 65 - 100 mg/dL    Performed by Priya    GLUCOSE, POC    Collection Time: 10/15/21  9:18 PM   Result Value Ref Range    Glucose (POC) 111 (H) 65 - 100 mg/dL    Performed by Margo    METABOLIC PANEL, BASIC    Collection Time: 10/16/21  4:34 AM   Result Value Ref Range    Sodium 140 136 - 145 mmol/L    Potassium 4.1 3.5 - 5.1 mmol/L    Chloride 106 98 - 107 mmol/L    CO2 27 21 - 32 mmol/L    Anion gap 7 7 - 16 mmol/L    Glucose 110 (H) 65 - 100 mg/dL    BUN 34 (H) 8 - 23 MG/DL    Creatinine 1.90 (H) 0.8 - 1.5 MG/DL    GFR est AA 44 (L) >60 ml/min/1.73m2    GFR est non-AA 37 (L) >60 ml/min/1.73m2    Calcium 8.6 8.3 - 10.4 MG/DL   CBC W/O DIFF    Collection Time: 10/16/21  4:34 AM   Result Value Ref Range    WBC 8.6 4.3 - 11.1 K/uL    RBC 4.13 (L) 4.23 - 5.6 M/uL    HGB 13.3 (L) 13.6 - 17.2 g/dL    HCT 41.0 (L) 41.1 - 50.3 %    MCV 99.3 (H) 79.6 - 97.8 FL    MCH 32.2 26.1 - 32.9 PG    MCHC 32.4 31.4 - 35.0 g/dL    RDW 13.9 11.9 - 14.6 %    PLATELET 247 708 - 789 K/uL    MPV 11.5 9.4 - 12.3 FL    ABSOLUTE NRBC 0.00 0.0 - 0.2 K/uL   GLUCOSE, POC    Collection Time: 10/16/21  6:25 AM   Result Value Ref Range    Glucose (POC) 104 (H) 65 - 100 mg/dL    Performed by Margo        Current Med List in Hospital:   Current Facility-Administered Medications   Medication Dose Route Frequency    sodium chloride (NS) flush 5-10 mL  5-10 mL IntraVENous Q8H    sodium chloride (NS) flush 5-10 mL  5-10 mL IntraVENous PRN    sodium chloride (NS) flush 5-40 mL  5-40 mL IntraVENous Q8H    sodium chloride (NS) flush 5-40 mL  5-40 mL IntraVENous PRN    acetaminophen (TYLENOL) tablet 650 mg  650 mg Oral Q6H PRN    Or    acetaminophen (TYLENOL) suppository 650 mg  650 mg Rectal Q6H PRN    polyethylene glycol (MIRALAX) packet 17 g  17 g Oral DAILY PRN    promethazine (PHENERGAN) tablet 12.5 mg  12.5 mg Oral Q6H PRN    Or    ondansetron (ZOFRAN) injection 4 mg  4 mg IntraVENous Q6H PRN    enoxaparin (LOVENOX) injection 40 mg  40 mg SubCUTAneous Q24H    aspirin delayed-release tablet 81 mg  81 mg Oral DAILY    dilTIAZem ER (CARDIZEM CD) capsule 120 mg  120 mg Oral DAILY    DULoxetine (CYMBALTA) capsule 60 mg  60 mg Oral BID    atorvastatin (LIPITOR) tablet 20 mg  20 mg Oral QHS    gabapentin (NEURONTIN) capsule 600 mg  600 mg Oral TID    tamsulosin (FLOMAX) capsule 0.8 mg  0.8 mg Oral QPM       Allergies   Allergen Reactions    Codeine Sulfate Unknown (comments)     Immunization History   Administered Date(s) Administered    COVID-19, PFIZER, MRNA, LNP-S, PF, 30MCG/0.3ML DOSE 01/22/2021, 02/17/2021    Influenza High Dose Vaccine PF 09/16/2016, 09/20/2018    Influenza Vaccine 09/27/2010, 09/08/2014, 10/01/2019, 10/10/2020    Influenza Vaccine (Tri) Adjuvanted (>65 Yrs FLUAD TRI 23897) 09/17/2018, 10/09/2019    Influenza Vaccine (Whole Virus) 10/10/2008    Influenza, Quadrivalent, Adjuvanted (>65 Yrs FLUAD QUAD 36900) 10/05/2020    Pneumococcal Conjugate (PCV-13) 09/16/2016, 10/16/2020    Pneumococcal Vaccine (Unspecified Type) 11/18/2009    Zoster Recombinant 10/16/2020       Recent Vital Data:  Patient Vitals for the past 24 hrs:   Temp Pulse Resp BP SpO2   10/16/21 0758 97.7 °F (36.5 °C) 83 20 132/81 91 %   10/15/21 1951 98 °F (36.7 °C) 81 20 (!) 146/93 90 %   10/15/21 1601 97.8 °F (36.6 °C) 62 20 115/75 95 %   10/15/21 1055  77      10/15/21 0937    131/86      Oxygen Therapy  O2 Sat (%): 91 % (10/16/21 0758)  Pulse via Oximetry: 75 beats per minute (10/14/21 2025)  O2 Device: None (Room air) (10/16/21 0758)  O2 Flow Rate (L/min): 2 l/min (10/14/21 2115)    Estimated body mass index is 30.42 kg/m² as calculated from the following:    Height as of this encounter: 5' 9\" (1.753 m). Weight as of this encounter: 93.4 kg (206 lb).   No intake or output data in the 24 hours ending 10/16/21 0848      Physical Exam:  General:    No overt distress  Head:  Normocephalic, atraumatic  Eyes:  Sclerae appear normal.  Pupils equally round. HENT:  Nares appear normal, no drainage. Moist mucous membranes  Neck:  No restricted ROM. Trachea midline  CV:   RRR. No m/r/g. Lungs: No wheezing. Even, unlabored on room air. Abdomen:   Soft, nontender, nondistended. Extremities: Warm and dry. No cyanosis or clubbing. Skin:     No rashes. Normal coloration  Neuro:  CN II-XII grossly intact. Psych:  Normal mood and affect. Signed:  Pavithra Parada NP    Part of this note may have been written by using a voice dictation software. The note has been proof read but may still contain some grammatical/other typographical errors.

## 2021-10-19 ENCOUNTER — HOSPITAL ENCOUNTER (OUTPATIENT)
Dept: ULTRASOUND IMAGING | Age: 76
Discharge: HOME | End: 2021-10-19
Attending: INTERNAL MEDICINE
Payer: MEDICARE

## 2021-10-19 DIAGNOSIS — N18.32 STAGE 3B CHRONIC KIDNEY DISEASE (HCC): ICD-10-CM

## 2021-10-19 PROCEDURE — 76770 US EXAM ABDO BACK WALL COMP: CPT

## 2021-10-20 ENCOUNTER — HOME CARE VISIT (OUTPATIENT)
Dept: HOME HEALTH SERVICES | Facility: HOME HEALTH | Age: 76
End: 2021-10-20

## 2022-02-15 ENCOUNTER — HOSPITAL ENCOUNTER (OUTPATIENT)
Dept: CT IMAGING | Age: 77
Discharge: HOME | End: 2022-02-15
Attending: NURSE PRACTITIONER
Payer: MEDICARE

## 2022-02-15 DIAGNOSIS — R10.32 LEFT LOWER QUADRANT ABDOMINAL PAIN: ICD-10-CM

## 2022-02-15 DIAGNOSIS — R19.04 LEFT LOWER QUADRANT ABDOMINAL MASS: ICD-10-CM

## 2022-02-15 PROBLEM — R74.8 ELEVATED ALKALINE PHOSPHATASE LEVEL: Status: ACTIVE | Noted: 2022-02-15

## 2022-02-15 PROCEDURE — 74011000636 HC RX REV CODE- 636: Performed by: NURSE PRACTITIONER

## 2022-02-15 PROCEDURE — 74176 CT ABD & PELVIS W/O CONTRAST: CPT

## 2022-02-15 RX ADMIN — DIATRIZOATE MEGLUMINE AND DIATRIZOATE SODIUM 15 ML: 660; 100 LIQUID ORAL; RECTAL at 10:57

## 2022-02-16 ENCOUNTER — HOSPITAL ENCOUNTER (OUTPATIENT)
Dept: GENERAL RADIOLOGY | Age: 77
Discharge: HOME | End: 2022-02-16

## 2022-02-16 DIAGNOSIS — R91.8 RADIOLOGIC FINDINGS OF LUNG FIELD, ABNORMAL: ICD-10-CM

## 2022-02-16 DIAGNOSIS — J44.9 CHRONIC OBSTRUCTIVE PULMONARY DISEASE, UNSPECIFIED COPD TYPE (HCC): ICD-10-CM

## 2022-02-16 PROBLEM — K40.90 LEFT INGUINAL HERNIA: Status: ACTIVE | Noted: 2022-02-16

## 2022-02-16 PROBLEM — N20.0 BILATERAL KIDNEY STONES: Status: ACTIVE | Noted: 2022-02-16

## 2022-02-16 PROBLEM — N28.1 RENAL CYST, LEFT: Status: ACTIVE | Noted: 2022-02-16

## 2022-02-16 PROBLEM — K44.9 HIATAL HERNIA: Status: ACTIVE | Noted: 2022-02-16

## 2022-02-16 NOTE — PROGRESS NOTES
Please let the patient know that his CXR showed mild atelectasis (air trapping) in right lower lung. Can occur in COPD. I am putting him on Doxycycline (antibiotic) to cover for bronchitis as this can develop from atelectasis. I will send Rx to his pharmacy.  Otherwise continue with plan of care as discussed

## 2022-03-07 PROBLEM — E11.65 UNCONTROLLED TYPE 2 DIABETES MELLITUS WITH HYPERGLYCEMIA (HCC): Status: ACTIVE | Noted: 2022-03-07

## 2022-03-18 PROBLEM — I25.10 CORONARY ARTERY DISEASE INVOLVING NATIVE CORONARY ARTERY OF NATIVE HEART WITHOUT ANGINA PECTORIS: Status: ACTIVE | Noted: 2017-05-25

## 2022-03-18 PROBLEM — R74.8 ELEVATED ALKALINE PHOSPHATASE LEVEL: Status: ACTIVE | Noted: 2022-02-15

## 2022-03-18 PROBLEM — N18.30 CKD (CHRONIC KIDNEY DISEASE) STAGE 3, GFR 30-59 ML/MIN (HCC): Status: ACTIVE | Noted: 2021-06-21

## 2022-03-18 PROBLEM — R91.8 RADIOLOGIC FINDINGS OF LUNG FIELD, ABNORMAL: Status: ACTIVE | Noted: 2022-02-16

## 2022-03-18 PROBLEM — Z72.0 TOBACCO ABUSE: Status: ACTIVE | Noted: 2017-05-25

## 2022-03-19 PROBLEM — R55 SYNCOPE AND COLLAPSE: Status: ACTIVE | Noted: 2021-10-14

## 2022-03-19 PROBLEM — K44.9 HIATAL HERNIA: Status: ACTIVE | Noted: 2022-02-16

## 2022-03-19 PROBLEM — R55 SYNCOPE: Status: ACTIVE | Noted: 2021-10-15

## 2022-03-19 PROBLEM — R00.0 SINUS TACHYCARDIA: Status: ACTIVE | Noted: 2020-05-20

## 2022-03-19 PROBLEM — E11.65 UNCONTROLLED TYPE 2 DIABETES MELLITUS WITH HYPERGLYCEMIA (HCC): Status: ACTIVE | Noted: 2022-03-07

## 2022-03-19 PROBLEM — E16.2 HYPOGLYCEMIA: Status: ACTIVE | Noted: 2021-10-14

## 2022-03-19 PROBLEM — K40.90 LEFT INGUINAL HERNIA: Status: ACTIVE | Noted: 2022-02-16

## 2022-03-19 PROBLEM — N28.1 RENAL CYST, LEFT: Status: ACTIVE | Noted: 2022-02-16

## 2022-03-20 PROBLEM — N20.0 BILATERAL KIDNEY STONES: Status: ACTIVE | Noted: 2022-02-16

## 2022-03-25 ENCOUNTER — NURSE TRIAGE (OUTPATIENT)
Dept: OTHER | Facility: CLINIC | Age: 77
End: 2022-03-25

## 2022-03-25 NOTE — TELEPHONE ENCOUNTER
Received call from [place] at [facility] with [employer] Complaint. Subjective: Caller states \" face swelling \"     Current Symptoms: 63 y/o left side face   Swelling and pain     Onset: 3 days     Associated Symptoms:   Pain on  Left side of  Gums     Pain Severity: 8/10      Temperature: denies     What has been tried:  Denies       Recommended disposition: Go to [facility]    Care advice provided, patient verbalizes understanding; denies any other questions or concerns; instructed to call back for any new or worsening symptoms. Patient/Caller agrees with recommended disposition; writer provided warm transfer to [facility] at [facility] for appointment scheduling    Attention Provider: Thank you for allowing me to participate in the care of your patient. The patient was connected to triage in response to information provided to the [facility]. Please do not respond through this encounter as the response is not directed to a shared pool.     Reason for Disposition   SEVERE swelling of the entire face    Protocols used: [facility]

## 2022-06-06 DIAGNOSIS — E11.65 UNCONTROLLED TYPE 2 DIABETES MELLITUS WITH HYPERGLYCEMIA (HCC): ICD-10-CM

## 2022-06-06 DIAGNOSIS — E78.5 DYSLIPIDEMIA: ICD-10-CM

## 2022-06-06 DIAGNOSIS — I10 ESSENTIAL HYPERTENSION, BENIGN: Primary | ICD-10-CM

## 2022-06-06 DIAGNOSIS — I10 ESSENTIAL HYPERTENSION, BENIGN: ICD-10-CM

## 2022-06-07 LAB
ALBUMIN SERPL-MCNC: 3.7 G/DL (ref 3.2–4.6)
ALBUMIN/GLOB SERPL: 1.3 {RATIO} (ref 1.2–3.5)
ALP SERPL-CCNC: 116 U/L (ref 50–136)
ALT SERPL-CCNC: 20 U/L (ref 12–65)
ANION GAP SERPL CALC-SCNC: 6 MMOL/L (ref 7–16)
AST SERPL-CCNC: 14 U/L (ref 15–37)
BILIRUB SERPL-MCNC: 0.4 MG/DL (ref 0.2–1.1)
BUN SERPL-MCNC: 41 MG/DL (ref 8–23)
CALCIUM SERPL-MCNC: 9 MG/DL (ref 8.3–10.4)
CHLORIDE SERPL-SCNC: 107 MMOL/L (ref 98–107)
CHOLEST SERPL-MCNC: 104 MG/DL
CO2 SERPL-SCNC: 28 MMOL/L (ref 21–32)
CREAT SERPL-MCNC: 1.9 MG/DL (ref 0.8–1.5)
EST. AVERAGE GLUCOSE BLD GHB EST-MCNC: 134 MG/DL
GLOBULIN SER CALC-MCNC: 2.9 G/DL (ref 2.3–3.5)
GLUCOSE SERPL-MCNC: 99 MG/DL (ref 65–100)
HBA1C MFR BLD: 6.3 % (ref 4.2–6.3)
HDLC SERPL-MCNC: 50 MG/DL (ref 40–60)
HDLC SERPL: 2.1 {RATIO}
LDLC SERPL CALC-MCNC: 35.8 MG/DL
POTASSIUM SERPL-SCNC: 4.9 MMOL/L (ref 3.5–5.1)
PROT SERPL-MCNC: 6.6 G/DL (ref 6.3–8.2)
SODIUM SERPL-SCNC: 141 MMOL/L (ref 138–145)
TRIGL SERPL-MCNC: 91 MG/DL (ref 35–150)
VLDLC SERPL CALC-MCNC: 18.2 MG/DL (ref 6–23)

## 2022-06-13 ENCOUNTER — OFFICE VISIT (OUTPATIENT)
Dept: FAMILY MEDICINE CLINIC | Facility: CLINIC | Age: 77
End: 2022-06-13
Payer: MEDICARE

## 2022-06-13 VITALS
WEIGHT: 198.2 LBS | DIASTOLIC BLOOD PRESSURE: 70 MMHG | BODY MASS INDEX: 27.75 KG/M2 | SYSTOLIC BLOOD PRESSURE: 122 MMHG | HEIGHT: 71 IN

## 2022-06-13 DIAGNOSIS — J44.9 CHRONIC OBSTRUCTIVE PULMONARY DISEASE, UNSPECIFIED COPD TYPE (HCC): ICD-10-CM

## 2022-06-13 DIAGNOSIS — I25.10 CORONARY ARTERY DISEASE INVOLVING NATIVE CORONARY ARTERY OF NATIVE HEART WITHOUT ANGINA PECTORIS: ICD-10-CM

## 2022-06-13 DIAGNOSIS — E11.42 TYPE 2 DIABETES MELLITUS WITH DIABETIC POLYNEUROPATHY, WITHOUT LONG-TERM CURRENT USE OF INSULIN (HCC): Primary | ICD-10-CM

## 2022-06-13 DIAGNOSIS — N18.30 STAGE 3 CHRONIC KIDNEY DISEASE, UNSPECIFIED WHETHER STAGE 3A OR 3B CKD (HCC): ICD-10-CM

## 2022-06-13 DIAGNOSIS — E78.00 PURE HYPERCHOLESTEROLEMIA: ICD-10-CM

## 2022-06-13 DIAGNOSIS — E78.1 PURE HYPERGLYCERIDEMIA: ICD-10-CM

## 2022-06-13 DIAGNOSIS — I10 PRIMARY HYPERTENSION: ICD-10-CM

## 2022-06-13 PROCEDURE — G8417 CALC BMI ABV UP PARAM F/U: HCPCS | Performed by: FAMILY MEDICINE

## 2022-06-13 PROCEDURE — G8427 DOCREV CUR MEDS BY ELIG CLIN: HCPCS | Performed by: FAMILY MEDICINE

## 2022-06-13 PROCEDURE — 3044F HG A1C LEVEL LT 7.0%: CPT | Performed by: FAMILY MEDICINE

## 2022-06-13 PROCEDURE — 1123F ACP DISCUSS/DSCN MKR DOCD: CPT | Performed by: FAMILY MEDICINE

## 2022-06-13 PROCEDURE — 4004F PT TOBACCO SCREEN RCVD TLK: CPT | Performed by: FAMILY MEDICINE

## 2022-06-13 PROCEDURE — 3023F SPIROM DOC REV: CPT | Performed by: FAMILY MEDICINE

## 2022-06-13 PROCEDURE — 99214 OFFICE O/P EST MOD 30 MIN: CPT | Performed by: FAMILY MEDICINE

## 2022-06-13 SDOH — ECONOMIC STABILITY: FOOD INSECURITY: WITHIN THE PAST 12 MONTHS, THE FOOD YOU BOUGHT JUST DIDN'T LAST AND YOU DIDN'T HAVE MONEY TO GET MORE.: NEVER TRUE

## 2022-06-13 SDOH — ECONOMIC STABILITY: FOOD INSECURITY: WITHIN THE PAST 12 MONTHS, YOU WORRIED THAT YOUR FOOD WOULD RUN OUT BEFORE YOU GOT MONEY TO BUY MORE.: NEVER TRUE

## 2022-06-13 ASSESSMENT — PATIENT HEALTH QUESTIONNAIRE - PHQ9
SUM OF ALL RESPONSES TO PHQ9 QUESTIONS 1 & 2: 0
SUM OF ALL RESPONSES TO PHQ QUESTIONS 1-9: 0
1. LITTLE INTEREST OR PLEASURE IN DOING THINGS: 0
2. FEELING DOWN, DEPRESSED OR HOPELESS: 0
SUM OF ALL RESPONSES TO PHQ QUESTIONS 1-9: 0

## 2022-06-13 ASSESSMENT — SOCIAL DETERMINANTS OF HEALTH (SDOH): HOW HARD IS IT FOR YOU TO PAY FOR THE VERY BASICS LIKE FOOD, HOUSING, MEDICAL CARE, AND HEATING?: NOT HARD AT ALL

## 2022-06-13 NOTE — PROGRESS NOTES
SUBJECTIVE:   Nicko Clark is a 64 y.o. male who has a past medical history significant for hypertension, high cholesterol, high triglycerides, diabetes with polyneuropathy, chronic kidney disease followed by nephrology, CAD followed by cardiology and COPD. Review of systems reveals no complaints of chest pain, shortness of breath, orthopnea or PND. GI and  review of systems is unremarkable. Patient does report that his neuropathy has become more problematic with increasing discomfort in his feet bilaterally. This is despite taking gabapentin 600 mg 3 times a day and Cymbalta 60 mg twice a day. No falls reported. HPI  See above    Past Medical History, Past Surgical History, Family history, Social History, and Medications were all reviewed with the patient today and updated as necessary. Current Outpatient Medications   Medication Sig Dispense Refill    Lancets MISC Please dispense lancets for patient to check FSBS four times a day      aspirin 81 MG EC tablet Take by mouth daily      atorvastatin (LIPITOR) 20 MG tablet TAKE ONE TABLET BY MOUTH ONE TIME DAILY      dilTIAZem (TIAZAC) 120 MG extended release capsule Take 120 mg by mouth daily      diphenhydrAMINE (BENADRYL) 25 MG capsule Take 25 mg by mouth      DULoxetine (CYMBALTA) 60 MG extended release capsule TAKE ONE CAPSULE BY MOUTH TWICE A DAY      gabapentin (NEURONTIN) 600 MG tablet TAKE ONE TABLET BY MOUTH THREE TIMES A DAY      glipiZIDE (GLUCOTROL XL) 5 MG extended release tablet Take 5 mg by mouth daily      pioglitazone (ACTOS) 30 MG tablet Take 30 mg by mouth daily      SITagliptin (JANUVIA) 50 MG tablet TAKE ONE TABLET BY MOUTH ONE TIME DAILY      tamsulosin (FLOMAX) 0.4 MG capsule TAKE TWO CAPSULES BY MOUTH ONE TIME DAILY AFTER EVENING MEAL       No current facility-administered medications for this visit.      Allergies   Allergen Reactions    Codeine Other (See Comments)     Patient Active Problem List Diagnosis    Type 2 diabetes mellitus with diabetic polyneuropathy, without long-term current use of insulin ([facility] [address] [facility]    Coronary artery disease involving native coronary artery of native heart without angina pectoris    Elevated alkaline phosphatase level    Radiologic findings of lung field, abnormal    COPD [facility]ch[facility] obstructive [facility] disease) [facility]    CKD (chronic kidney disease) stage 3, GFR 30-59 ml/min ([facility]    Tobacco abuse    Syncope and collapse    Hypoglycemia    Sinus tachycardia    Basal cell carcinoma of scalp    Left inguinal hernia    Essential hypertension, benign    Syncope    Hiatal hernia    Dyslipidemia    Renal cyst, left    Uncontrolled type 2 diabetes mellitus with hyperglycemia ([facility] [address] [facility]    Bilateral kidney stones     Past Medical History:   Diagnosis Date    Chronic airway obstruction, not elsewhere classified 7/1/2015    Essential hypertension, benign 7/1/2015    History of colonic polyps     Inflammatory and toxic neuropathy, unspecified 7/1/2015    Kidney disease     Kidney stone     Neoplasm of uncertain behavior of skin     Neuralgia, neuritis, and radiculitis, unspecified     Other and unspecified hyperlipidemia 7/1/2015    Type II or unspecified type diabetes mellitus without mention of complication, not stated as uncontrolled     Type II or unspecified type diabetes mellitus without mention of complication, uncontrolled 7/1/2015    Ulcer of ankle ([facility] [address] 7/1/2015     Past Surgical History:   Procedure Laterality Date    COLONOSCOPY  2007    w/ Dr. Shazia Puente       Family History   Problem Relation Age of Onset    Hypertension Father      Social History     Tobacco Use    Smoking status: Current Every Day Smoker     Packs/day: 0.50    Smokeless tobacco: Never Used   Substance Use Topics    Alcohol use:  Yes     Alcohol/week: 0.0 standard drinks         Review of Systems  See above    OBJECTIVE:  /70   Ht 5' 11\" (1.803 m) Wt 198 lb 3.2 oz (89.9 kg)   BMI 27.64 kg/m²      Physical Exam  Constitutional:       General: He is not in acute distress. Appearance: Normal appearance. He is not ill-appearing. HENT:      Head: Normocephalic and atraumatic. Cardiovascular:      Rate and Rhythm: Normal rate and regular rhythm. Heart sounds: Normal heart sounds. No murmur heard. Pulmonary:      Effort: Pulmonary effort is normal.      Breath sounds: Normal breath sounds. No wheezing or rhonchi. Musculoskeletal:         General: Normal range of motion. Cervical back: Normal range of motion and neck supple. Right lower leg: No edema. Left lower leg: No edema. Skin:     General: Skin is warm. Findings: No rash. Neurological:      Mental Status: He is alert and oriented to person, place, and time. Psychiatric:         Mood and Affect: Mood normal.         Behavior: Behavior normal.         Thought Content: Thought content normal.         Judgment: Judgment normal.         Medical problems and test results were reviewed with the patient today. ASSESSMENT and PLAN    1. Diabetes with polyneuropathy. A1c is 6.3. Congratulated him on his improvement. Adjustments in medication have been successful as well as his efforts with diet. Please refer to prior notes for details. Encourage yearly retinal exams and daily feet exams. Regarding his neuropathy I would suggest we try topical CBD oil 3 times a day prior to adjusting his gabapentin. He is in agreement to try this. Fall precautions given. 2.  Hypertension. Blood pressure 122/70. Continue current therapy. 3.  High cholesterol. LDL is 35. Liver enzymes remain normal.  Continue current therapy. 4.  High triglycerides. Triglycerides are 91. Continue dietary focus. 5.  Chronic kidney disease. Followed by nephrology. Avoid nephrotoxic drugs. Creatinine stable at 1.9.    6. CAD. Per cardiology. No chest pain reported.     7. COPD.  Breathing seems to be doing well. Continue to monitor. Elements of this note have been dictated using speech recognition software. As a result, errors of speech recognition may have occurred.

## 2022-07-06 ENCOUNTER — OFFICE VISIT (OUTPATIENT)
Dept: SURGERY | Age: 77
End: 2022-07-06
Payer: MEDICARE

## 2022-07-06 ENCOUNTER — PREP FOR PROCEDURE (OUTPATIENT)
Dept: SURGERY | Age: 77
End: 2022-07-06

## 2022-07-06 VITALS — BODY MASS INDEX: 27.72 KG/M2 | WEIGHT: 198 LBS | HEIGHT: 71 IN

## 2022-07-06 DIAGNOSIS — E11.65 UNCONTROLLED TYPE 2 DIABETES MELLITUS WITH HYPERGLYCEMIA (HCC): Primary | ICD-10-CM

## 2022-07-06 DIAGNOSIS — K40.30 INCARCERATED LEFT INGUINAL HERNIA: ICD-10-CM

## 2022-07-06 PROCEDURE — 4004F PT TOBACCO SCREEN RCVD TLK: CPT | Performed by: SURGERY

## 2022-07-06 PROCEDURE — 3044F HG A1C LEVEL LT 7.0%: CPT | Performed by: SURGERY

## 2022-07-06 PROCEDURE — 99215 OFFICE O/P EST HI 40 MIN: CPT | Performed by: SURGERY

## 2022-07-06 PROCEDURE — G8417 CALC BMI ABV UP PARAM F/U: HCPCS | Performed by: SURGERY

## 2022-07-06 PROCEDURE — G8427 DOCREV CUR MEDS BY ELIG CLIN: HCPCS | Performed by: SURGERY

## 2022-07-06 PROCEDURE — 1123F ACP DISCUSS/DSCN MKR DOCD: CPT | Performed by: SURGERY

## 2022-07-06 RX ORDER — SODIUM CHLORIDE 0.9 % (FLUSH) 0.9 %
5-40 SYRINGE (ML) INJECTION PRN
Status: CANCELLED | OUTPATIENT
Start: 2022-07-06

## 2022-07-06 RX ORDER — SODIUM CHLORIDE 9 MG/ML
INJECTION, SOLUTION INTRAVENOUS PRN
Status: CANCELLED | OUTPATIENT
Start: 2022-07-06

## 2022-07-06 RX ORDER — SODIUM CHLORIDE 0.9 % (FLUSH) 0.9 %
5-40 SYRINGE (ML) INJECTION EVERY 12 HOURS SCHEDULED
Status: CANCELLED | OUTPATIENT
Start: 2022-07-06

## 2022-07-11 ENCOUNTER — ANESTHESIA EVENT (OUTPATIENT)
Dept: SURGERY | Age: 77
End: 2022-07-11
Payer: MEDICARE

## 2022-07-11 RX ORDER — BUDESONIDE AND FORMOTEROL FUMARATE DIHYDRATE 160; 4.5 UG/1; UG/1
2 AEROSOL RESPIRATORY (INHALATION) AS NEEDED
COMMUNITY

## 2022-07-11 RX ORDER — DOCUSATE SODIUM 100 MG/1
200 CAPSULE, LIQUID FILLED ORAL NIGHTLY
COMMUNITY

## 2022-07-11 NOTE — PERIOP NOTE
Phone pre-assessment completed. Verified name&  . Order to obtain consent not found in EHR, however patient verifies case posting. Type 1B surgery,  assessment complete. Orders not received. Labs per surgeon: unknown  Labs per anesthesia protocol: none      Medical/surgical history questions answered at their best of ability. All prior to admission medications reviewed and documented in [facility]. Instructed to take ONLY THE FOLLOWING MEDICATIONS ON THE DAY OF SURGERY according to anesthesia guidelines with sips of water: cymbalta, diltiazem and gabapentin . Verbalizes understanding to HOLD THE FOLLOWING MEDICATIONS: aspirin     VERBALIZES UNDERSTANDING TO HOLD ALL VITAMINS AND SUPPLEMENTS and NSAIDS (aspirin, naproxen, ibuprofen) IMMEDIATELY PER ANESTHESIA PROTOCOL. Instructed on the following:    > Arrive at [facility], time of arrival to be called the day before by 1700  > NPO after midnight including gum, mints, and ice chips  > Responsible adult must drive patient to the hospital, stay during surgery, and patient will need supervision 24 hours after anesthesia  > Use antibacterial soap in shower the night before surgery and on the morning of surgery  > All piercings must be removed prior to arrival.    > Leave all valuables (money and jewelry) at home but bring insurance card and ID on DOS.   > You may be required to pay a deductible or co-pay on the day of your procedure. You can pre-pay by calling [phone] if your surgery is at the [facility] or [phone] if your surgery is at the [facility]. > Do not wear make-up, nail polish, lotions, cologne, perfumes, powders, or oil on skin. Artificial nails are not permitted. Teach back successful and demonstrates knowledge of instruction. If you do not receive a call with your arrival time by 5pm the business day prior to surgery, please call [phone].

## 2022-07-12 ENCOUNTER — ANESTHESIA (OUTPATIENT)
Dept: SURGERY | Age: 77
End: 2022-07-12
Payer: MEDICARE

## 2022-07-12 ENCOUNTER — HOSPITAL ENCOUNTER (OUTPATIENT)
Age: 77
Setting detail: OUTPATIENT SURGERY
Discharge: HOME | End: 2022-07-12
Attending: SURGERY | Admitting: SURGERY
Payer: MEDICARE

## 2022-07-12 VITALS
OXYGEN SATURATION: 90 % | DIASTOLIC BLOOD PRESSURE: 62 MMHG | TEMPERATURE: 97.7 F | SYSTOLIC BLOOD PRESSURE: 120 MMHG | RESPIRATION RATE: 18 BRPM | WEIGHT: 202 LBS | BODY MASS INDEX: 28.28 KG/M2 | HEART RATE: 60 BPM | HEIGHT: 71 IN

## 2022-07-12 DIAGNOSIS — K40.30 INCARCERATED LEFT INGUINAL HERNIA: Primary | ICD-10-CM

## 2022-07-12 LAB
GLUCOSE BLD STRIP.AUTO-MCNC: 105 MG/DL (ref 65–100)
SERVICE CMNT-IMP: ABNORMAL

## 2022-07-12 PROCEDURE — 3600000013 HC SURGERY LEVEL 3 ADDTL 15MIN: Performed by: SURGERY

## 2022-07-12 PROCEDURE — 6360000002 HC RX W HCPCS: Performed by: ANESTHESIOLOGY

## 2022-07-12 PROCEDURE — 99024 POSTOP FOLLOW-UP VISIT: CPT | Performed by: SURGERY

## 2022-07-12 PROCEDURE — 2580000003 HC RX 258: Performed by: ANESTHESIOLOGY

## 2022-07-12 PROCEDURE — 6360000002 HC RX W HCPCS: Performed by: SURGERY

## 2022-07-12 PROCEDURE — 2500000003 HC RX 250 WO HCPCS: Performed by: SURGERY

## 2022-07-12 PROCEDURE — A4217 STERILE WATER/SALINE, 500 ML: HCPCS | Performed by: SURGERY

## 2022-07-12 PROCEDURE — 7100000010 HC PHASE II RECOVERY - FIRST 15 MIN: Performed by: SURGERY

## 2022-07-12 PROCEDURE — 6360000002 HC RX W HCPCS

## 2022-07-12 PROCEDURE — 6370000000 HC RX 637 (ALT 250 FOR IP): Performed by: ANESTHESIOLOGY

## 2022-07-12 PROCEDURE — 2709999900 HC NON-CHARGEABLE SUPPLY: Performed by: SURGERY

## 2022-07-12 PROCEDURE — 7100000001 HC PACU RECOVERY - ADDTL 15 MIN: Performed by: SURGERY

## 2022-07-12 PROCEDURE — C1781 MESH (IMPLANTABLE): HCPCS | Performed by: SURGERY

## 2022-07-12 PROCEDURE — 3600000003 HC SURGERY LEVEL 3 BASE: Performed by: SURGERY

## 2022-07-12 PROCEDURE — 49507 PRP I/HERN INIT BLOCK >5 YR: CPT | Performed by: SURGERY

## 2022-07-12 PROCEDURE — 82962 GLUCOSE BLOOD TEST: CPT

## 2022-07-12 PROCEDURE — 7100000000 HC PACU RECOVERY - FIRST 15 MIN: Performed by: SURGERY

## 2022-07-12 PROCEDURE — 3700000001 HC ADD 15 MINUTES (ANESTHESIA): Performed by: SURGERY

## 2022-07-12 PROCEDURE — 94640 AIRWAY INHALATION TREATMENT: CPT

## 2022-07-12 PROCEDURE — 3700000000 HC ANESTHESIA ATTENDED CARE: Performed by: SURGERY

## 2022-07-12 PROCEDURE — 94760 N-INVAS EAR/PLS OXIMETRY 1: CPT

## 2022-07-12 PROCEDURE — 2500000003 HC RX 250 WO HCPCS

## 2022-07-12 PROCEDURE — 7100000011 HC PHASE II RECOVERY - ADDTL 15 MIN: Performed by: SURGERY

## 2022-07-12 PROCEDURE — 2580000003 HC RX 258: Performed by: SURGERY

## 2022-07-12 DEVICE — MESH HERN W3XL6IN INGUINAL POLYPR MFIL RECTANG: Type: IMPLANTABLE DEVICE | Site: INGUINAL | Status: FUNCTIONAL

## 2022-07-12 RX ORDER — OXYCODONE HYDROCHLORIDE 5 MG/1
10 TABLET ORAL PRN
Status: COMPLETED | OUTPATIENT
Start: 2022-07-12 | End: 2022-07-12

## 2022-07-12 RX ORDER — BUPIVACAINE HYDROCHLORIDE 2.5 MG/ML
INJECTION, SOLUTION EPIDURAL; INFILTRATION; INTRACAUDAL PRN
Status: DISCONTINUED | OUTPATIENT
Start: 2022-07-12 | End: 2022-07-12 | Stop reason: ALTCHOICE

## 2022-07-12 RX ORDER — ROCURONIUM BROMIDE 10 MG/ML
INJECTION, SOLUTION INTRAVENOUS PRN
Status: DISCONTINUED | OUTPATIENT
Start: 2022-07-12 | End: 2022-07-12 | Stop reason: SDUPTHER

## 2022-07-12 RX ORDER — ACETAMINOPHEN 500 MG
1000 TABLET ORAL ONCE
Status: COMPLETED | OUTPATIENT
Start: 2022-07-12 | End: 2022-07-12

## 2022-07-12 RX ORDER — HYDROMORPHONE HYDROCHLORIDE 2 MG/ML
0.25 INJECTION, SOLUTION INTRAMUSCULAR; INTRAVENOUS; SUBCUTANEOUS EVERY 5 MIN PRN
Status: DISCONTINUED | OUTPATIENT
Start: 2022-07-12 | End: 2022-07-12 | Stop reason: HOSPADM

## 2022-07-12 RX ORDER — OXYCODONE HYDROCHLORIDE 5 MG/1
5 TABLET ORAL PRN
Status: COMPLETED | OUTPATIENT
Start: 2022-07-12 | End: 2022-07-12

## 2022-07-12 RX ORDER — ONDANSETRON 2 MG/ML
4 INJECTION INTRAMUSCULAR; INTRAVENOUS
Status: DISCONTINUED | OUTPATIENT
Start: 2022-07-12 | End: 2022-07-12 | Stop reason: HOSPADM

## 2022-07-12 RX ORDER — MIDAZOLAM HYDROCHLORIDE 2 MG/2ML
2 INJECTION, SOLUTION INTRAMUSCULAR; INTRAVENOUS
Status: DISCONTINUED | OUTPATIENT
Start: 2022-07-12 | End: 2022-07-12 | Stop reason: HOSPADM

## 2022-07-12 RX ORDER — HYDROMORPHONE HYDROCHLORIDE 2 MG/ML
0.5 INJECTION, SOLUTION INTRAMUSCULAR; INTRAVENOUS; SUBCUTANEOUS EVERY 5 MIN PRN
Status: DISCONTINUED | OUTPATIENT
Start: 2022-07-12 | End: 2022-07-12 | Stop reason: HOSPADM

## 2022-07-12 RX ORDER — DIPHENHYDRAMINE HYDROCHLORIDE 50 MG/ML
12.5 INJECTION INTRAMUSCULAR; INTRAVENOUS
Status: DISCONTINUED | OUTPATIENT
Start: 2022-07-12 | End: 2022-07-12 | Stop reason: HOSPADM

## 2022-07-12 RX ORDER — HYDROCODONE BITARTRATE AND ACETAMINOPHEN 5; 325 MG/1; MG/1
1-2 TABLET ORAL EVERY 8 HOURS PRN
Qty: 28 TABLET | Refills: 0 | Status: SHIPPED | OUTPATIENT
Start: 2022-07-12 | End: 2022-07-19

## 2022-07-12 RX ORDER — FENTANYL CITRATE 50 UG/ML
100 INJECTION, SOLUTION INTRAMUSCULAR; INTRAVENOUS
Status: DISCONTINUED | OUTPATIENT
Start: 2022-07-12 | End: 2022-07-12 | Stop reason: HOSPADM

## 2022-07-12 RX ORDER — ALBUTEROL SULFATE 2.5 MG/3ML
2.5 SOLUTION RESPIRATORY (INHALATION) EVERY 6 HOURS PRN
Status: DISCONTINUED | OUTPATIENT
Start: 2022-07-12 | End: 2022-07-12 | Stop reason: HOSPADM

## 2022-07-12 RX ORDER — SODIUM CHLORIDE, SODIUM LACTATE, POTASSIUM CHLORIDE, CALCIUM CHLORIDE 600; 310; 30; 20 MG/100ML; MG/100ML; MG/100ML; MG/100ML
INJECTION, SOLUTION INTRAVENOUS CONTINUOUS
Status: DISCONTINUED | OUTPATIENT
Start: 2022-07-12 | End: 2022-07-12 | Stop reason: HOSPADM

## 2022-07-12 RX ORDER — PROPOFOL 10 MG/ML
INJECTION, EMULSION INTRAVENOUS PRN
Status: DISCONTINUED | OUTPATIENT
Start: 2022-07-12 | End: 2022-07-12 | Stop reason: SDUPTHER

## 2022-07-12 RX ORDER — LIDOCAINE HYDROCHLORIDE 20 MG/ML
INJECTION, SOLUTION EPIDURAL; INFILTRATION; INTRACAUDAL; PERINEURAL PRN
Status: DISCONTINUED | OUTPATIENT
Start: 2022-07-12 | End: 2022-07-12 | Stop reason: SDUPTHER

## 2022-07-12 RX ADMIN — HYDROMORPHONE HYDROCHLORIDE 0.5 MG: 2 INJECTION INTRAMUSCULAR; INTRAVENOUS; SUBCUTANEOUS at 15:33

## 2022-07-12 RX ADMIN — ALBUTEROL SULFATE 2.5 MG: 2.5 SOLUTION RESPIRATORY (INHALATION) at 11:13

## 2022-07-12 RX ADMIN — SODIUM CHLORIDE, POTASSIUM CHLORIDE, SODIUM LACTATE AND CALCIUM CHLORIDE: 600; 310; 30; 20 INJECTION, SOLUTION INTRAVENOUS at 10:38

## 2022-07-12 RX ADMIN — PROPOFOL 20 MG: 10 INJECTION, EMULSION INTRAVENOUS at 14:25

## 2022-07-12 RX ADMIN — FENTANYL CITRATE 50 MCG: 50 INJECTION INTRAMUSCULAR; INTRAVENOUS at 13:36

## 2022-07-12 RX ADMIN — FENTANYL CITRATE 25 MCG: 50 INJECTION INTRAMUSCULAR; INTRAVENOUS at 14:30

## 2022-07-12 RX ADMIN — OXYCODONE 5 MG: 5 TABLET ORAL at 15:33

## 2022-07-12 RX ADMIN — PROPOFOL 120 MG: 10 INJECTION, EMULSION INTRAVENOUS at 13:36

## 2022-07-12 RX ADMIN — HYDROMORPHONE HYDROCHLORIDE 0.5 MG: 2 INJECTION INTRAMUSCULAR; INTRAVENOUS; SUBCUTANEOUS at 15:16

## 2022-07-12 RX ADMIN — ROCURONIUM BROMIDE 40 MG: 50 INJECTION, SOLUTION INTRAVENOUS at 13:36

## 2022-07-12 RX ADMIN — LIDOCAINE HYDROCHLORIDE 100 MG: 20 INJECTION, SOLUTION EPIDURAL; INFILTRATION; INTRACAUDAL; PERINEURAL at 13:36

## 2022-07-12 RX ADMIN — FENTANYL CITRATE 25 MCG: 50 INJECTION INTRAMUSCULAR; INTRAVENOUS at 14:00

## 2022-07-12 RX ADMIN — PROPOFOL 20 MG: 10 INJECTION, EMULSION INTRAVENOUS at 14:19

## 2022-07-12 RX ADMIN — PROPOFOL 20 MG: 10 INJECTION, EMULSION INTRAVENOUS at 14:07

## 2022-07-12 RX ADMIN — Medication 2000 MG: at 13:40

## 2022-07-12 RX ADMIN — ACETAMINOPHEN 1000 MG: 500 TABLET, FILM COATED ORAL at 10:24

## 2022-07-12 ASSESSMENT — LIFESTYLE VARIABLES: SMOKING_STATUS: 1

## 2022-07-12 ASSESSMENT — PAIN SCALES - GENERAL
PAINLEVEL_OUTOF10: 9
PAINLEVEL_OUTOF10: 0

## 2022-07-12 ASSESSMENT — PAIN - FUNCTIONAL ASSESSMENT: PAIN_FUNCTIONAL_ASSESSMENT: 0-10

## 2022-07-12 NOTE — ANESTHESIA POSTPROCEDURE EVALUATION
Department of Anesthesiology  Postprocedure Note    Patient: Kim Mullins  MRN: [id number]  YOB: 1945  Date of evaluation: 7/12/2022      Procedure Summary     Date: 07/12/22 Room / Location: [facility] OP OR 02 / [facility]    Anesthesia Start: 1321 Anesthesia Stop: 1459    Procedure: OPEN LEFT INGUINAL HERNIA REPAIR WITH MESH (Left Abdomen) Diagnosis:       Inguinal hernia      (Inguinal hernia [K40.90])    Surgeons: Dominguez Paredes MD Responsible Provider: Niurka Guevara MD    Anesthesia Type: General ASA Status: 3          Anesthesia Type: General    Ignacio Phase I: Ignacio Score: 7    Ignacio Phase II:        Anesthesia Post Evaluation    Patient location during evaluation: PACU  Patient participation: complete - patient participated  Level of consciousness: awake and alert  Airway patency: patent  Nausea & Vomiting: no nausea  Cardiovascular status: hemodynamically stable  Respiratory status: acceptable  Hydration status: euvolemic

## 2022-07-12 NOTE — H&P
measuring 4 mm. A 1 cm exophytic lesion extends off the lateral upper to midpole cortex of the left kidney. Although poorly assessed on this noncontrast study, this likely represents a cyst noted on prior renal ultrasound dated 10/19/2021 given that no additional lesion is seen.     The visualized loops of small bowel and colon are normal in caliber. The  appendix is seen on image 41 without acute abnormality. Mild diverticulosis is seen in the sigmoid colon. No free fluid, free air, or focal inflammatory changes are seen in the abdomen. No adenopathy is seen. The abdominal aorta demonstrates moderate atherosclerotic and ectatic changes. Specifically, there is fusiform ectasia of the mid abdominal aorta measuring up to 2.7 cm in size. No acute osseous abnormality is seen.     CT PELVIS:  No abnormal pelvic fluid collections or inflammatory changes are present. No pelvic adenopathy is seen. The urinary bladder is unremarkable. A skin marker has been placed over the left groin soft tissues on axial image 97 indicating the patient's mass. Underlying this is an indirect appearing inguinal hernia containing mesenteric fat and a short segment of the proximal sigmoid colon. No asymmetric dilation of colon proximal to this level is seen to suggest significant obstruction. No acute features are seen of hernia sac contents to favor incarceration although this is a clinical diagnosis.     IMPRESSION  1. Skin marker overlying an indirect appearing left inguinal hernia containing mesenteric fat and a short segment of proximal sigmoid colon. No evidence for obstruction is seen. No acute findings are seen of hernia sac contents to suggest potential incarceration although this is a clinical diagnosis.      2. Additional chronic appearing changes as described above. This includes mucus impacted distal airways in the dependent left lower lobe with mild adjacent scarring or inflammatory changes.    The involvement of dependent airways at this level can result from chronic aspiration.               Past Medical History:   Diagnosis Date    CAD (coronary artery disease)     non-obstructive, Dr Antonio Vaughan-  denies hx of mi, never had heart cath    Chronic airway obstruction, not elsewhere classified 07/11/2022    symbicort inh- very seldom, does not require supplemental oxygen- smokes 0.75- ppd since age 11    Essential hypertension, benign 07/01/2015    H/O echocardiogram 10/15/2021    LVEF 60-65%    History of colonic polyps     Inflammatory and toxic neuropathy, unspecified 07/01/2015    Kidney disease     Kidney stone     Neoplasm of uncertain behavior of skin     Neuralgia, neuritis, and radiculitis, unspecified     Neuropathy of both feet     Osteoarthritis     Other and unspecified hyperlipidemia 07/01/2015    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 Chio Peralta       Current Facility-Administered Medications   Medication Dose Route Frequency    ceFAZolin (ANCEF) 2000 mg in sterile water 20 mL IV syringe  2,000 mg IntraVENous Once     Current Outpatient Medications   Medication Sig    docusate sodium (COLACE) 100 MG capsule Take 200 mg by mouth at bedtime    budesonide-formoterol (SYMBICORT) 160-4.5 MCG/ACT AERO Inhale 2 puffs into the lungs as needed    Lancets MISC Please dispense lancets for patient to check FSBS four times a day    aspirin 81 MG EC tablet Take 81 mg by mouth at bedtime 7/11/22 - instructed to hold for surgery- last dose 7/10/22 at bedtime    atorvastatin (LIPITOR) 20 MG tablet Take 20 mg by mouth at bedtime TAKE ONE TABLET BY MOUTH ONE TIME DAILY    dilTIAZem (TIAZAC) 120 MG extended release capsule Take 120 mg by mouth every morning     diphenhydrAMINE (BENADRYL) 25 MG capsule Take 25 mg by mouth as needed     DULoxetine (CYMBALTA) 60 MG extended release capsule Take 60 mg by mouth 2 times daily TAKE ONE CAPSULE BY MOUTH TWICE A DAY    gabapentin (NEURONTIN) 600 MG tablet Take 600 mg by mouth 3 times daily.  TAKE ONE TABLET BY MOUTH THREE TIMES A DAY    glipiZIDE (GLUCOTROL XL) 5 MG extended release tablet Take 5 mg by mouth daily    pioglitazone (ACTOS) 30 MG tablet Take 30 mg by mouth at bedtime     SITagliptin (JANUVIA) 50 MG tablet TAKE ONE TABLET BY MOUTH ONE TIME DAILY    tamsulosin (FLOMAX) 0.4 MG capsule Take 0.8 mg by mouth every evening TAKE TWO CAPSULES BY MOUTH ONE TIME DAILY AFTER EVENING MEAL     Codeine  Social History     Socioeconomic History    Marital status:      Spouse name: None    Number of children: None    Years of education: None    Highest education level: None   Occupational History    None   Tobacco Use    Smoking status: Current Every Day Smoker     Packs/day: 0.75     Years: 63.00     Pack years: 47.25     Types: Cigarettes     Start date: 1958    Smokeless tobacco: Never Used   Vaping Use    Vaping Use: Never used   Substance and Sexual Activity    Alcohol use: Not Currently     Alcohol/week: 0.0 standard drinks    Drug use: None    Sexual activity: None   Other Topics Concern    None   Social History Narrative    None     Social Determinants of Health     Financial Resource Strain: Low Risk     Difficulty of Paying Living Expenses: Not hard at all   Food Insecurity: No Food Insecurity    Worried About Running Out of Food in the Last Year: Never true    Ran Out of Food in the Last Year: Never true   Transportation Needs:     Lack of Transportation (Medical): Not on file    Lack of Transportation (Non-Medical): Not on file   Physical Activity:     Days of Exercise per Week: Not on file    Minutes of Exercise per Session: Not on file   Stress:     Feeling of Stress : Not on file   Social Connections:     Frequency of Communication with Friends and Family: Not on file    Frequency of Social Gatherings with Friends and Family: Not on file    Attends Sabianism Services: Not on file    Active Member of [employer] or Organizations: Not on file    Attends Club or Organization Meetings: Not on file    Marital Status: Not on file   Intimate Partner Violence:     Fear of Current or Ex-Partner: Not on file    Emotionally Abused: Not on file    Physically Abused: Not on file    Sexually Abused: Not on file   Housing Stability:     Unable to Pay for Housing in the Last Year: Not on file    Number of [place] in the Last Year: Not on file    Unstable Housing in the Last Year: Not on file     Social History     Tobacco Use   Smoking Status Current Every Day Smoker    Packs/day: 0.75    Years: 63.00    Pack years: 47.25    Types: Cigarettes    Start date: 1958   Smokeless Tobacco Never Used     Family History   Problem Relation Age of Onset    Hypertension Father      ROS: The patient has no difficulty with chest pain or shortness of breath. No fever or chills. Comprehensive review of systems was otherwise unremarkable except as noted above. Physical Exam:   Ht 5' 11\" (1.803 m)   Wt 202 lb (91.6 kg)   BMI 28.17 kg/m²   Vitals:    07/11/22 0824   Weight: 202 lb (91.6 kg)   Height: 5' 11\" (1.803 m)     @[facility]@  [unfilled]    Constitutional: Alert, oriented, cooperative patient in no acute distress; appears stated age    Eyes:Sclera are clear.  EOMs intact  ENMT: no external lesions gross hearing normal; no obvious neck masses, no ear or lip lesions, nares normal  CV: RRR. Normal perfusion  Resp: No JVD. Breathing is  non-labored; no audible wheezing. GI: soft and non-distended; Large LIH, non-tender, cannot completely reduce  He was wearing a groin truss which is helping bridge to surgery         Musculoskeletal: fairly weak with poor balance; uses a quad cane for support. No embolic signs or cyanosis. Neuro:  Oriented; moves all 4; no focal deficits  Psychiatric: normal affect and mood, no memory impairment    Recent vitals (if inpt):  @IPVITALS(24:)@    Amount and/or Complexity of Data Reviewed and Analyzed:  I reviewed and analyzed all of the unique labs and radiologic studies that are shown below as well as any that are in the HPI, and any that are in the expanded problem list below  *Each unique test, order, or document contributes to the combination of 2 or combination of 3 in Category 1 below. For this visit I also reviewed old records and prior notes. No results for input(s): WBC, HGB, PLT, NA, K, CL, CO2, BUN, CREA, GLU, INR, APTT, ALT, AML, AML, LCAD, PCO2, PO2, HCO3 in the last 72 hours.     Invalid input(s): PTP, TBIL, TBILI, CBIL, SGOT, GPT, AP, LPSE, NH4, TROPT, TROIQ,  PH  Review of most recent CBC  Lab Results   Component Value Date    WBC 8.6 10/16/2021    HGB 13.3 (L) 10/16/2021    HCT 41.0 (L) 10/16/2021    MCV 95.6 02/16/2022     10/16/2021       Review of most recent BMP  Lab Results   Component Value Date/Time     06/06/2022 02:02 PM    K 4.9 06/06/2022 02:02 PM     06/06/2022 02:02 PM    CO2 28 06/06/2022 02:02 PM    BUN 41 06/06/2022 02:02 PM    CREATININE 1.90 06/06/2022 02:02 PM    GLUCOSE 99 06/06/2022 02:02 PM    CALCIUM 9.0 06/06/2022 02:02 PM        Review of most recent LFTs (and lipase if done)  Lab Results   Component Value Date    ALT 20 06/06/2022    AST 14 (L) 06/06/2022    ALKPHOS 116 06/06/2022    BILITOT 0.4 06/06/2022     No results found for: LIPASE    No results found for: INR, APTT, LCAD    Review of most recent HgbA1c  Lab Results   Component Value Date    LABA1C 6.3 06/06/2022     Lab Results   Component Value Date     06/06/2022       Nutritional assessment screen for wound healing issues:  Lab Results   Component Value Date    LABALBU 3.7 06/06/2022       @lastcovr@    Xray Result (most recent):  XR CHEST STANDARD TWO VW 02/16/2022    Narrative  Two view chest    History: Increased mucus production. COPD. Comparison: 10/14/2021    Findings: The right hemidiaphragm is mildly elevated, unchanged. There is mild  right basilar subsegmental atelectasis or scar. There are no pleural effusions  or confluent airspace opacities. The heart is normal in size and configuration. The pulmonary vascularity is within normal limits. The visualized osseous  structures are unremarkable. Impression  Mildly elevated right hemidiaphragm with increasing right basilar  subsegmental atelectasis or scar. CT Result (most recent):  CT ABDOMEN PELVIS WO IV CONTRAST 02/15/2022    Narrative  CT ABDOMEN AND PELVIS WITHOUT INTRAVENOUS CONTRAST DATED 2/15/2022. History: Left lower quadrant mass for some time although began hurting one week  ago. Dull, intermittent pain. Does have constipation. Comparison: None    Technique:   Multiple contiguous helical CT images reconstructed at 5 mm  intervals were obtained from above the diaphragms through the ischial  tuberosities following oral contrast only. All CT scans performed at this  facility use one or all of the following: Automated exposure control, adjustment  of the mA and/or kVp according to patient's size, iterative reconstruction. Findings:  CT ABDOMEN:  Limited evaluation of the lung bases and base of the mediastinum demonstrates a  small to moderate size hiatal hernia. An elevated right hemidiaphragm is seen  with is suggested on prior chest x-ray studies.  Mucous impacted distal airways  are seen are seen in the left this level is seen to suggest  significant obstruction. No acute features are seen of hernia sac contents to  favor incarceration although this is a clinical diagnosis. Impression  1. Skin marker overlying an indirect appearing left inguinal hernia containing  mesenteric fat and a short segment of proximal sigmoid colon. No evidence for  obstruction is seen. No acute findings are seen of hernia sac contents to  suggest potential incarceration although this is a clinical diagnosis. 2. Additional chronic appearing changes as described above. This includes mucus  impacted distal airways in the dependent left lower lobe with mild adjacent  scarring or inflammatory changes. The involvement of dependent airways at this  level can result from chronic aspiration. This report was made using voice transcription. Despite my best efforts to avoid  any, transcription errors may persist. If there is any question about the  accuracy of the report or need for clarification, then please call [phone], or text me through perfectserv for clarification or correction. US Result (most recent):  US RETROPERITONEAL COMPLETE 10/19/2021    Narrative  Exam: US RETROPERITONEUM COMP on 10/19/2021 12:37 PM    Clinical History: The Male patient is 64years old presenting for chronic kidney  disease. Comparison:  None    Findings: The right kidney measures 10.1 cm and is normal in size and contour. There is  no evidence of solid or cystic lesion. No evidence of hydronephrosis is seen. There are no shadowing stones. The left kidney measures 9.6 cm and is normal in size and contour. A small  exophytic cyst is seen projecting from the upper pole measuring 1.5 cm. No  evidence of hydronephrosis is seen. There are no shadowing stones. The urinary bladder appears unremarkable. There is incidental mild ectasia of infrarenal aorta measuring up to 2.7 cm. Impression  1. Unremarkable renal ultrasound.     CPT code(s) [id number]      Admission date (for inpatients): (Not on file)   * No surgery found *  * No surgery found *        ASSESSMENT/PLAN:  [unfilled]  Principal Problem:    Incarcerated left inguinal hernia  Resolved Problems:    * No resolved hospital problems. *     Patient Active Problem List    Diagnosis Date Noted    [facility] 03/07/2022    Radiologic findings of lung field, abnormal 02/16/2022    Incarcerated left inguinal hernia 02/16/2022    Hiatal hernia 02/16/2022    Renal cyst, left 02/16/2022    Bilateral kidney stones 02/16/2022    Elevated alkaline phosphatase level 02/15/2022    Syncope 10/15/2021    Syncope and collapse 10/14/2021    Hypoglycemia 10/14/2021    CKD (chronic kidney disease) stage 3, GFR 30-59 ml/min ([facility]) 06/21/2021    Sinus tachycardia 05/20/2020    Coronary artery disease involving native coronary artery of native heart without angina pectoris 05/25/2017    Tobacco abuse 05/25/2017    Basal cell carcinoma of scalp 09/14/2015    Type 2 diabetes mellitus with diabetic polyneuropathy, without long-term current use of insulin ([facility] [address] 07/01/2015    COPD (chronic obstructive pulmonary disease) ([facility] 07/01/2015    Essential hypertension, benign 07/01/2015    Dyslipidemia 07/01/2015          Number and Complexity of Problems addressed and   Risks of complications and/or morbidity of management            Uncontrolled diabetes  We delayed his surgery slightly to let him get better control of his diabetes mellitus. His most recent hemoglobin A1c was 13.8 on 2/16/22 and his creatinine was elevated at 1.8.   He reported that he and his primary care physician have attributed this elevated hemoglobin A1c to his aggressively eating Snickers bars and candy  He reports he has stopped doing this as of February and has a hemoglobin A1c pending in late June with his next primary care appointment    Encourage close follow-up with primary

## 2022-07-12 NOTE — OP NOTE
indirect hernia component ws present. The wound was irrigated with Vancomycin irrigation. A piece of prolene mesh was soaked in bacitracin, cut to size and anchored at the pubic tubercle with 2-0 prolene suture. The mesh was secured medially to the conjoint tendon and laterally to the iliopubic tract and then the defect cut in the mesh to allow accommodation the cord was secured with 2-0 prolene suture superolaterally. The wound was once again irrigated with bacitracin and hemostasis confirmed. It was then anesthetized with marcaine and the external oblique was re approximated with 2-0 vicryl suture. Yury's fascia was closed with 3-O vicryl suture. The skin was closed with clips, and a sterile dressing of Telfa and tegaderm was placed. At the end of the operation, all sponge, instruments, and needle counts were correct.      Fernando House MD, FACS
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
## 2022-07-12 NOTE — ANESTHESIA PROCEDURE NOTES
Airway  Date/Time: 7/12/2022 1:38 PM  Urgency: elective    Airway not difficult    General Information and Staff    Patient location during procedure: OR  Resident/CRNA: JONATHAN Hollingsworth - CRNA  Performed: resident/CRNA     Indications and Patient Condition  Indications for airway management: anesthesia  Spontaneous Ventilation: absent  Sedation level: deep  Preoxygenated: yes  Patient position: sniffing  MILS not maintained throughout  Mask difficulty assessment: vent by bag mask + OA or adjuvant +/- NMBA    Final Airway Details  Final airway type: endotracheal airway      Successful airway: ETT  Cuffed: yes   Successful intubation technique: direct laryngoscopy  Facilitating devices/methods: intubating stylet  Endotracheal tube insertion site: oral  Blade: Butch  Blade size: #4  ETT size (mm): 8.0  Cormack-Lehane Classification: grade I - full view of glottis  Placement verified by: chest auscultation and capnometry   Measured from: lips  ETT to lips (cm): 24  Number of attempts at approach: 1  Ventilation between attempts: bag mask

## 2022-07-13 ENCOUNTER — HOSPITAL ENCOUNTER (EMERGENCY)
Age: 77
Discharge: HOME | End: 2022-07-13
Attending: EMERGENCY MEDICINE | Admitting: EMERGENCY MEDICINE
Payer: MEDICARE

## 2022-07-13 ENCOUNTER — TELEPHONE (OUTPATIENT)
Dept: CARDIOLOGY CLINIC | Age: 77
End: 2022-07-13

## 2022-07-13 VITALS
HEART RATE: 80 BPM | RESPIRATION RATE: 17 BRPM | DIASTOLIC BLOOD PRESSURE: 86 MMHG | OXYGEN SATURATION: 99 % | SYSTOLIC BLOOD PRESSURE: 125 MMHG | TEMPERATURE: 98.2 F | HEIGHT: 71 IN | WEIGHT: 202 LBS | BODY MASS INDEX: 28.28 KG/M2

## 2022-07-13 DIAGNOSIS — R33.8 ACUTE URINARY RETENTION: Primary | ICD-10-CM

## 2022-07-13 LAB
APPEARANCE UR: CLEAR
BILIRUB UR QL: NEGATIVE
COLOR UR: YELLOW
GLUCOSE UR STRIP.AUTO-MCNC: 100 MG/DL
HGB UR QL STRIP: NEGATIVE
KETONES UR QL STRIP.AUTO: NEGATIVE MG/DL
LEUKOCYTE ESTERASE UR QL STRIP.AUTO: NEGATIVE
NITRITE UR QL STRIP.AUTO: NEGATIVE
PH UR STRIP: 5.5 [PH] (ref 5–9)
PROT UR STRIP-MCNC: NEGATIVE MG/DL
SP GR UR REFRACTOMETRY: 1.02 (ref 1–1.02)
UROBILINOGEN UR QL STRIP.AUTO: 0.2 EU/DL (ref 0.2–1)

## 2022-07-13 PROCEDURE — 99283 EMERGENCY DEPT VISIT LOW MDM: CPT | Performed by: EMERGENCY MEDICINE

## 2022-07-13 PROCEDURE — 51702 INSERT TEMP BLADDER CATH: CPT | Performed by: EMERGENCY MEDICINE

## 2022-07-13 PROCEDURE — 81003 URINALYSIS AUTO W/O SCOPE: CPT

## 2022-07-13 RX ORDER — DILTIAZEM HYDROCHLORIDE 120 MG/1
120 CAPSULE, EXTENDED RELEASE ORAL EVERY MORNING
Qty: 90 CAPSULE | Refills: 3 | Status: SHIPPED | OUTPATIENT
Start: 2022-07-13 | End: 2022-07-14 | Stop reason: SDUPTHER

## 2022-07-13 ASSESSMENT — PAIN - FUNCTIONAL ASSESSMENT
PAIN_FUNCTIONAL_ASSESSMENT: 0-10
PAIN_FUNCTIONAL_ASSESSMENT: NONE - DENIES PAIN

## 2022-07-13 ASSESSMENT — PAIN SCALES - GENERAL: PAINLEVEL_OUTOF10: 8

## 2022-07-13 ASSESSMENT — ENCOUNTER SYMPTOMS
ABDOMINAL PAIN: 1
VOMITING: 0
DIARRHEA: 0
NAUSEA: 0
CONSTIPATION: 0

## 2022-07-13 ASSESSMENT — PAIN DESCRIPTION - ORIENTATION: ORIENTATION: LOWER

## 2022-07-13 ASSESSMENT — PAIN DESCRIPTION - LOCATION: LOCATION: ABDOMEN

## 2022-07-13 NOTE — ED TRIAGE NOTES
Pt arrives to ED via wheelchair and requests a male nurse and male doctor. Patient had hernia surgery yesterday and has no urinated since. Provider at bedside.

## 2022-07-13 NOTE — ED PROVIDER NOTES
[facility] Emergency Department Provider Note                   PCP:                Mary Alva MD               Age: 64 y.o. Sex: male     No diagnosis found. DISPOSITION          MDM  Number of Diagnoses or Management Options  Acute urinary retention: new, needed workup     Amount and/or Complexity of Data Reviewed  Clinical lab tests: ordered and reviewed  Review and summarize past medical records: yes    Risk of Complications, Morbidity, and/or Mortality  Presenting problems: low  Diagnostic procedures: minimal  Management options: low    Patient Progress  Patient progress: improved       No orders of the defined types were placed in this encounter. Joel Lindsay is a 64 y.o. male who presents to the Emergency Department with chief complaint of    Chief Complaint   Patient presents with    Urinary Retention      66-year-old  male status post left herniorrhaphy yesterday presents to the emergency department with inability urinate since the surgery. According the patient his last urination was yesterday morning, and he does not believe they put a catheter in during surgery. He denies any fever or chills but does describe some lower abdominal discomfort secondary to the inability to urinate. He denies any dysuria, change in bowel habits, melena or hematochezia. Symptoms are worse with attempts to urinate and with palpation. No alleviating factors. Patient denies prior history of urinary retention but does state that he does have a history of an enlarged prostate. The history is provided by the patient and the spouse. Review of Systems   Constitutional: Negative for chills and fever. Gastrointestinal: Positive for abdominal pain. Negative for constipation, diarrhea, nausea and vomiting. Genitourinary: Positive for difficulty urinating. Negative for dysuria, frequency, hematuria and testicular pain. All other systems reviewed and are negative.       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    Kidney disease     Kidney stone     Neoplasm of uncertain behavior of skin     Neuralgia, neuritis, and radiculitis, unspecified     Neuropathy of both feet     Osteoarthritis     Other and unspecified hyperlipidemia 07/01/2015    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 Samule Siemens          Family History   Problem Relation Age of Onset    Hypertension Father            Social Connections:     Frequency of Communication with Friends and Family: Not on file    Frequency of Social Gatherings with Friends and Family: Not on file    Attends Gnosticist Services: Not on file    Active Member of Clubs or Organizations: Not on file    Attends Club or Organization Meetings: Not on file    Marital Status: Not on file        Allergies   Allergen Reactions    Codeine Nausea And Vomiting        Vitals signs and nursing note reviewed.    Patient Vitals for the past 4 hrs:   Temp Pulse Resp BP SpO2   07/13/22 0517 98 °F (36.7 °C) 81 16 128/87 100 %          Physical Exam  Vitals and nursing note reviewed. Constitutional:       General: He is in acute distress. HENT:      Head: Normocephalic and atraumatic. Right Ear: External ear normal.      Left Ear: External ear normal.      Nose: Nose normal.      Mouth/Throat:      Mouth: Mucous membranes are moist.   Eyes:      Extraocular Movements: Extraocular movements intact. Conjunctiva/sclera: Conjunctivae normal.      Pupils: Pupils are equal, round, and reactive to light. Cardiovascular:      Rate and Rhythm: Normal rate and regular rhythm. Heart sounds: No murmur heard. Pulmonary:      Effort: Pulmonary effort is normal.      Breath sounds: Normal breath sounds. Abdominal:      General: Abdomen is flat. Bowel sounds are normal. There is no distension. Palpations: Abdomen is soft. Tenderness: There is abdominal tenderness in the right lower quadrant, suprapubic area and left lower quadrant. There is no right CVA tenderness, left CVA tenderness, guarding or rebound. Musculoskeletal:         General: Normal range of motion. Cervical back: Normal range of motion and neck supple. Skin:     General: Skin is warm and dry. Neurological:      General: No focal deficit present. Mental Status: He is alert and oriented to person, place, and time. Psychiatric:         Mood and Affect: Mood normal.         Behavior: Behavior normal.          Procedures      The patient was observed in the ED. patient was given a Matos catheter, had over 1000 mL of urine output with significant relief of his discomfort. Patient was given a leg bag with instruction.   He was instructed to follow-up with either his general surgeon or his urologist in the next 2 to 3 days for possible removal.    Results Reviewed:      Recent Results (from the past 24 hour(s))   POCT Glucose    Collection Time: 07/12/22 10:18 AM   Result Value Ref Range    POC Glucose 105 (H) 65 - 100 mg/dL    Performed by: Mracel    Urinalysis    Collection Time: 07/13/22  5:30 AM   Result Value Ref Range    Color, UA YELLOW      Appearance CLEAR      Specific Gravity, UA 1.020 1.001 - 1.023      pH, Urine 5.5 5.0 - 9.0      Protein, UA Negative NEG mg/dL    Glucose,  mg/dL    Ketones, Urine Negative NEG mg/dL    Bilirubin Urine Negative NEG      Blood, Urine Negative NEG      Urobilinogen, Urine 0.2 0.2 - 1.0 EU/dL    Nitrite, Urine Negative NEG      Leukocyte Esterase, Urine Negative NEG         I discussed the results of all labs, procedures, radiographs, and treatments with the patient and available family. Treatment plan is agreed upon and the patient is ready for discharge. All voiced understanding of the discharge plan and medication instructions or changes as appropriate. Questions about treatment in the ED were answered. All were encouraged to return should symptoms worsen or new problems develop. Voice dictation software was used during the making of this note. This software is not perfect and grammatical and other typographical errors may be present. This note has not been completely proofread for errors.      Chery Amin MD  07/13/22 9536

## 2022-07-13 NOTE — ED NOTES
I have reviewed discharge instructions with the patient. The patient verbalized understanding. Patient left ED via Discharge Method: ambulatory to Home with wife. Opportunity for questions and clarification provided. Patient given 0 scripts. To continue your aftercare when you leave the hospital, you may receive an automated call from our care team to check in on how you are doing. This is a free service and part of our promise to provide the best care and service to meet your aftercare needs.  If you have questions, or wish to unsubscribe from this service please call [phone]. Thank you for Choosing our [facility] Emergency Department.         Agnes Robb RN  07/13/22 5885

## 2022-07-13 NOTE — TELEPHONE ENCOUNTER
Requested Prescriptions     Signed Prescriptions Disp Refills    dilTIAZem (TIAZAC) 120 MG extended release capsule 90 capsule 3     Sig: Take 1 capsule by mouth every morning     Authorizing Provider: Amaya Hernandez     Ordering User: Rishi Nunes

## 2022-07-13 NOTE — ED NOTES
Leg bag placed and instructions given to pt and pts wife at this time.      Og Lozano RN  07/13/22 0600

## 2022-07-14 ENCOUNTER — TELEPHONE (OUTPATIENT)
Dept: CARDIOLOGY CLINIC | Age: 77
End: 2022-07-14

## 2022-07-14 RX ORDER — DILTIAZEM HYDROCHLORIDE 120 MG/1
120 CAPSULE, EXTENDED RELEASE ORAL EVERY MORNING
Qty: 90 CAPSULE | Refills: 3 | Status: SHIPPED | OUTPATIENT
Start: 2022-07-14

## 2022-07-14 NOTE — TELEPHONE ENCOUNTER
MEDICATION REFILL REQUEST      Name of Medication:  Diltiazem  Dose:  120 mg  Frequency:  1 a day  Quantity:  90  Days' supply:  90       Pharmacy Name/Location:  [facility] in [address] [place] [address] on [address] [place] [address]

## 2022-07-14 NOTE — TELEPHONE ENCOUNTER
Requested Prescriptions     Signed Prescriptions Disp Refills    dilTIAZem (TIAZAC) 120 MG extended release capsule 90 capsule 3     Sig: Take 1 capsule by mouth every morning     Authorizing Provider: Anali Wolfe     Ordering User: Cynthia Dubois

## 2022-07-19 ENCOUNTER — OFFICE VISIT (OUTPATIENT)
Dept: UROLOGY | Age: 77
End: 2022-07-19
Payer: MEDICARE

## 2022-07-19 DIAGNOSIS — N28.1 RENAL CYST, LEFT: ICD-10-CM

## 2022-07-19 DIAGNOSIS — Z87.442 HISTORY OF KIDNEY STONES: ICD-10-CM

## 2022-07-19 DIAGNOSIS — R33.8 POSTOPERATIVE URINARY RETENTION: Primary | ICD-10-CM

## 2022-07-19 DIAGNOSIS — N99.89 POSTOPERATIVE URINARY RETENTION: Primary | ICD-10-CM

## 2022-07-19 PROCEDURE — G8427 DOCREV CUR MEDS BY ELIG CLIN: HCPCS | Performed by: NURSE PRACTITIONER

## 2022-07-19 PROCEDURE — 99214 OFFICE O/P EST MOD 30 MIN: CPT | Performed by: NURSE PRACTITIONER

## 2022-07-19 PROCEDURE — G8417 CALC BMI ABV UP PARAM F/U: HCPCS | Performed by: NURSE PRACTITIONER

## 2022-07-19 PROCEDURE — 4004F PT TOBACCO SCREEN RCVD TLK: CPT | Performed by: NURSE PRACTITIONER

## 2022-07-19 PROCEDURE — 1123F ACP DISCUSS/DSCN MKR DOCD: CPT | Performed by: NURSE PRACTITIONER

## 2022-07-19 ASSESSMENT — ENCOUNTER SYMPTOMS
NAUSEA: 0
BACK PAIN: 0

## 2022-07-19 NOTE — PROGRESS NOTES
[facility] [address] [place] [address], [facility] [address]  [phone]          Angela Manley  : 1945    Chief Complaint   Patient presents with    Follow-up    Urinary Catheter          HPI     Angela Manley is a 64 y.o. male presents today for ER follow up due to urinary retention. Patient is status post left inguinal hernia repair 2022. Unfortunately postoperatively he was unable to void. He returned to the ER 2022 and a catheter had to be placed. 1000 mL of urine was drained from his bladder. A follow-up here was arranged. He has seen Dr. Marielle Montilla in the past due to stone disease and findings of renal cyst.  He has not had any significant issues with urination in the past.    Prior to his surgery patient reports he would get up once or twice a night to void. His urine flow and stream seem to be normal.  However reviewing his medical records it does appear he is on Flomax and has been on this for several years. It is prescribed by his PCP. He continues this today. There is no family history of prostate problems or prostate cancer. The last PSA on record is from 2019 and that PSA came back to be 2.6.     Past Medical History:   Diagnosis Date    CAD (coronary artery disease)     non-obstructive, Dr Fernanda Velásquez-  denies hx of mi, never had heart cath    Chronic airway obstruction, not elsewhere classified 2022    symbicort inh- very seldom, does not require supplemental oxygen- smokes 0.75- ppd since age 11    Essential hypertension, benign 2015    H/O echocardiogram 10/15/2021    LVEF 60-65%    History of colonic polyps     Inflammatory and toxic neuropathy, unspecified 2015    Kidney disease     Kidney stone     Neoplasm of uncertain behavior of skin     Neuralgia, neuritis, and radiculitis, unspecified     Neuropathy of both feet     Osteoarthritis     Other and unspecified hyperlipidemia 2015    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 Kaye Socks Left 7/12/2022    OPEN LEFT INGUINAL HERNIA REPAIR WITH MESH performed by Sangeeta Alvarado MD at [address] [place] [address]       Current Outpatient Medications   Medication Sig Dispense Refill    dilTIAZem (TIAZAC) 120 MG extended release capsule Take 1 capsule by mouth every morning 90 capsule 3    HYDROcodone-acetaminophen (NORCO) 5-325 MG per tablet Take 1-2 tablets by mouth every 8 hours as needed for Pain for up to 7 days. 28 tablet 0    docusate sodium (COLACE) 100 MG capsule Take 200 mg by mouth at bedtime      budesonide-formoterol (SYMBICORT) 160-4.5 MCG/ACT AERO Inhale 2 puffs into the lungs as needed      Lancets MISC Please dispense lancets for patient to check FSBS four times a day      aspirin 81 MG EC tablet Take 81 mg by mouth at bedtime 7/11/22 - instructed to hold for surgery- last dose 7/10/22 at bedtime      atorvastatin (LIPITOR) 20 MG tablet Take 20 mg by mouth at bedtime TAKE ONE TABLET BY MOUTH ONE TIME DAILY      diphenhydrAMINE (BENADRYL) 25 MG capsule Take 25 mg by mouth as needed       DULoxetine (CYMBALTA) 60 MG extended release capsule Take 60 mg by mouth 2 times daily TAKE ONE CAPSULE BY MOUTH TWICE A DAY      gabapentin (NEURONTIN) 600 MG tablet Take 600 mg by mouth 3 times daily.  TAKE ONE TABLET BY MOUTH THREE TIMES A DAY      glipiZIDE (GLUCOTROL XL) 5 MG extended release tablet Take 5 mg by mouth daily      pioglitazone (ACTOS) 30 MG tablet Take 30 mg by mouth at bedtime       SITagliptin (JANUVIA) 50 MG tablet TAKE ONE TABLET BY MOUTH ONE TIME DAILY      tamsulosin (FLOMAX) 0.4 MG capsule Take 0.8 mg by mouth every evening TAKE TWO CAPSULES BY MOUTH ONE TIME DAILY AFTER EVENING MEAL       No current facility-administered medications for this visit. Allergies   Allergen Reactions    Codeine Nausea And Vomiting     Social History     Socioeconomic History    Marital status:      Spouse name: Not on file    Number of children: Not on file    Years of education: Not on file    Highest education level: Not on file   Occupational History    Not on file   Tobacco Use    Smoking status: Every Day     Packs/day: 0.75     Years: 63.00     Pack years: 47.25     Types: Cigarettes     Start date: 1958    Smokeless tobacco: Never   Vaping Use    Vaping Use: Never used   Substance and Sexual Activity    Alcohol use: Not Currently     Alcohol/week: 0.0 standard drinks    Drug use: Not on file    Sexual activity: Not on file   Other Topics Concern    Not on file   Social History Narrative    Not on file     Social Determinants of Health     Financial Resource Strain: Low Risk     Difficulty of Paying Living Expenses: Not hard at all   Food Insecurity: No Food Insecurity    Worried About Running Out of Food in the Last Year: Never true    Ran Out of Food in the Last Year: Never true   Transportation Needs: Not on file   Physical Activity: Not on file   Stress: Not on file   Social Connections: Not on file   Intimate Partner Violence: Not on file   Housing Stability: Not on file     Family History   Problem Relation Age of Onset    Hypertension Father        Review of Systems  Constitutional:   Negative for fever. Cardiovascular:  Negative for chest pain. GI:  Negative for nausea. Genitourinary:  Negative for urinary burning. Musculoskeletal:  Negative for back pain.     PHYSICAL EXAM    GENERAL: No acute distress, Awake, Alert, Oriented X 3, Gait normal  ABDOMEN: soft, non tender, non-distended, positive bowel sounds, no organomegaly, no palpable masses, no guarding, no rebound tenderness  SKIN: No rash, no erythema, no lacerations or abrasions, no ecchymosis  MUSCULOSKELETAL - MAEW, no edema   - chandler catheter is patent. Urine is ena in color. Assessment and Plan    ICD-10-CM    1. Postoperative urinary retention  N99.89     R33.8       2. History of kidney stones  Z87.442       3. Renal cyst, left  N28.1         PLAN:  Balloon was deflated and catheter removed without any difficulty. Pt tolerated the procedure well. Pt was instructed to increase fluids today and to let us know if unable to void. If this occurs he will need to return to the office for catheter reinsertion. He will return to the office in 2 weeks for UA and PVR. He understands however if he is unable to void at 3 PM today he should return to the office for us to evaluate. If catheter has to be reinserted he will need to have a cystoscopy with Dr. Phyllis Parra. Tyrus Opitz, NP, APRN - NP  Dr. Luisana Cherry is supervising physician today and he approves plan of care. Return in about 2 weeks (around 8/2/2022) for U/A, PVR and OV. Elements of this note have been dictated using speech recognition software. Although reviewed, errors of speech recognition may have occurred.

## 2022-07-25 ENCOUNTER — OFFICE VISIT (OUTPATIENT)
Dept: SURGERY | Age: 77
End: 2022-07-25

## 2022-07-25 VITALS — HEIGHT: 71 IN | WEIGHT: 202 LBS | BODY MASS INDEX: 28.28 KG/M2

## 2022-07-25 DIAGNOSIS — K40.30 INCARCERATED LEFT INGUINAL HERNIA: Primary | ICD-10-CM

## 2022-07-25 NOTE — PROGRESS NOTES
H&P/Consult Note/Progress Note/Office Note:   Donovan Goyal  MRN: [id number]  :1945  Age:76 y.o.    HPI: Donovan Goyal is a 64 y.o. male who is S/P open repair of incarcerated LIH with mesh on 22. Prior to surgery he was referred by Dr Jose Rae with a LIH containing sigmoid colon. Mr Nayely Cherry reported a bulge in his left groin since approx 2022. He denied pain related to the bulge. Nothing in particular made it better or worse. No prior inguinal hernia surgery. He had CT imaging as shown below. He has uncontrolled DM with Hgb A1c 13.8 on 22 (HgbA1c improved to 6.3 on 22)  He was working on this with his primary care doctor and attributed this to Circ[employer] bars  He reported he has been compliant with his medications. 2/15/22 CT abd/pelvis with oral but no IV contrast  Hx: LLQ mass for some time although began hurting one week ago. Dull, intermittent pain. Does have constipation. CT ABDOMEN:    Limited evaluation of the lung bases and base of the mediastinum demonstrates a small to moderate size hiatal hernia. An elevated right hemidiaphragm is seen with is suggested on prior chest x-ray studies. Mucous impacted distal airways  are seen are seen in the left lower lobe with mild adjacent inflammatory appearing changes or scarring. These involve more dependent airways in the left lower lobe. Assessment of the solid organs is limited by the lack of administered intravenous contrast.    A subtle abnormality could be missed. The Liver is homogeneous in attenuation. The spleen is homogeneous in attenuation. No contour deforming mass lesions are seen of the pancreas or adrenal glands. The gallbladder has an unremarkable CT appearance without radiopaque stones or pericholecystic fluid/inflammatory changes.    Small nonobstructing bilateral renal stones are seen with the largest residing in the upper pole collecting system of the right kidney measuring 4 mm. A 1 cm exophytic lesion extends off the lateral upper to midpole cortex of the left kidney. Although poorly assessed on this noncontrast study, this likely represents a cyst noted on prior renal ultrasound dated 10/19/2021 given that no additional lesion is seen. The visualized loops of small bowel and colon are normal in caliber. The  appendix is seen on image 41 without acute abnormality. Mild diverticulosis is seen in the sigmoid colon. No free fluid, free air, or focal inflammatory changes are seen in the abdomen. No adenopathy is seen. The abdominal aorta demonstrates moderate atherosclerotic and ectatic changes. Specifically, there is fusiform ectasia of the mid abdominal aorta measuring up to 2.7 cm in size. No acute osseous abnormality is seen. CT PELVIS:  No abnormal pelvic fluid collections or inflammatory changes are present. No pelvic adenopathy is seen. The urinary bladder is unremarkable. A skin marker has been placed over the left groin soft tissues on axial image 97 indicating the patient's mass. Underlying this is an indirect appearing inguinal hernia containing mesenteric fat and a short segment of the proximal sigmoid colon. No asymmetric dilation of colon proximal to this level is seen to suggest significant obstruction. No acute features are seen of hernia sac contents to favor incarceration although this is a clinical diagnosis. IMPRESSION  1. Skin marker overlying an indirect appearing left inguinal hernia containing mesenteric fat and a short segment of proximal sigmoid colon. No evidence for obstruction is seen. No acute findings are seen of hernia sac contents to suggest potential incarceration although this is a clinical diagnosis. 2. Additional chronic appearing changes as described above. This includes mucus impacted distal airways in the dependent left lower lobe with mild adjacent scarring or inflammatory changes.    The involvement of dependent airways at this level can result from chronic aspiration.               Past Medical History:   Diagnosis Date    CAD (coronary artery disease)     non-obstructive, Dr Gama Sierra-  denies hx of mi, never had heart cath    Chronic airway obstruction, not elsewhere classified 07/11/2022    symbicort inh- very seldom, does not require supplemental oxygen- smokes 0.75- ppd since age 11    Essential hypertension, benign 07/01/2015    H/O echocardiogram 10/15/2021    LVEF 60-65%    History of colonic polyps     Inflammatory and toxic neuropathy, unspecified 07/01/2015    Kidney disease     Kidney stone     Neoplasm of uncertain behavior of skin     Neuralgia, neuritis, and radiculitis, unspecified     Neuropathy of both feet     Osteoarthritis     Other and unspecified hyperlipidemia 07/01/2015    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 Yamel Cantu Left 7/12/2022    OPEN LEFT INGUINAL HERNIA REPAIR WITH MESH performed by Dominguez Paredes MD at [address] [place] [address]       Current Outpatient Medications   Medication Sig    dilTIAZem (TIAZAC) 120 MG extended release capsule Take 1 capsule by mouth every morning    docusate sodium (COLACE) 100 MG capsule Take 200 mg by mouth at bedtime    budesonide-formoterol (SYMBICORT) 160-4.5 MCG/ACT AERO Inhale 2 puffs into the lungs as needed    Lancets MISC Please dispense lancets for patient to check FSBS four times a day    aspirin 81 MG EC tablet Take 81 mg by mouth at bedtime 7/11/22 - instructed to hold for surgery- last dose 7/10/22 at bedtime    atorvastatin (LIPITOR) 20 MG tablet Take 20 mg by mouth at bedtime TAKE ONE TABLET BY MOUTH ONE TIME DAILY    diphenhydrAMINE (BENADRYL) 25 MG capsule Take 25 mg by mouth as needed     DULoxetine (CYMBALTA) 60 MG extended release capsule Take 60 mg by mouth 2 times daily TAKE ONE CAPSULE BY MOUTH TWICE A DAY    gabapentin (NEURONTIN) 600 MG tablet Take 600 mg by mouth 3 times daily. TAKE ONE TABLET BY MOUTH THREE TIMES A DAY    glipiZIDE (GLUCOTROL XL) 5 MG extended release tablet Take 5 mg by mouth daily    pioglitazone (ACTOS) 30 MG tablet Take 30 mg by mouth at bedtime     SITagliptin (JANUVIA) 50 MG tablet TAKE ONE TABLET BY MOUTH ONE TIME DAILY    tamsulosin (FLOMAX) 0.4 MG capsule Take 0.8 mg by mouth every evening TAKE TWO CAPSULES BY MOUTH ONE TIME DAILY AFTER EVENING MEAL     No current facility-administered medications for this visit.      Codeine  Social History     Socioeconomic History    Marital status:      Spouse name: None    Number of children: None    Years of education: None    Highest education level: None   Tobacco Use    Smoking status: Every Day     Packs/day: 0.75     Years: 63.00     Pack years: 47.25     Types: Cigarettes     Start date: 1958    Smokeless tobacco: Never   Vaping Use    Vaping Use: Never used   Substance and Sexual Activity    Alcohol use: Not Currently     Alcohol/week: 0.0 standard drinks     Social Determinants of Health     Financial Resource Strain: Low Risk     Difficulty of Paying Living Expenses: Not hard at all   Food Insecurity: No Food Insecurity    Worried About Running Out of Food in the Last Year: Never true    Ran Out of Food in the Last Year: Never true     Social History     Tobacco Use   Smoking Status Every Day    Packs/day: 0.75    Years: 63.00    Pack years: 47.25    Types: Cigarettes    Start date: 1958   Smokeless Tobacco Never     Family History   Problem Relation Age of Onset    Hypertension Father      ROS: The patient has no difficulty with chest pain or shortness of breath. No fever or chills. Comprehensive review of systems was otherwise unremarkable except as noted above. Physical Exam:   Ht 5' 11\" (1.803 m)   Wt 202 lb (91.6 kg)   BMI 28.17 kg/m²   Vitals:    07/25/22 1437   Weight: 202 lb (91.6 kg)   Height: 5' 11\" (1.803 m)     [unfilled]  [unfilled]    Constitutional: Alert, oriented, cooperative patient in no acute distress; appears stated age    Eyes:Sclera are clear. EOMs intact  ENMT: no external lesions gross hearing normal; no obvious neck masses, no ear or lip lesions, nares normal  CV: RRR. Normal perfusion  Resp: No JVD. Breathing is  non-labored; no audible wheezing. GI: soft and non-distended; Large LIH, non-tender, cannot completely reduce  He was wearing a groin truss which is helping bridge to surgery         Musculoskeletal: fairly weak with poor balance; uses a quad cane for support. No embolic signs or cyanosis. Neuro:  Oriented; moves all 4; no focal deficits  Psychiatric: normal affect and mood, no memory impairment    Recent vitals (if inpt):  @IPVITALS(24:)@    Amount and/or Complexity of Data Reviewed and Analyzed:  I reviewed and analyzed all of the unique labs and radiologic studies that are shown below as well as any that are in the HPI, and any that are in the expanded problem list below  *Each unique test, order, or document contributes to the combination of 2 or combination of 3 in Category 1 below. For this visit I also reviewed old records and prior notes. No results for input(s): WBC, HGB, PLT, NA, K, CL, CO2, BUN, CREA, GLU, INR, APTT, ALT, AML, AML, LCAD, PCO2, PO2, HCO3 in the last 72 hours.     Invalid input(s): PTP, TBIL, TBILI, CBIL, SGOT, GPT, AP, LPSE, NH4, TROPT, TROIQ,  PH  Review of most recent CBC  Lab Results   Component Value Date    WBC 8.6 10/16/2021    HGB 13.3 (L) 10/16/2021    HCT 41.0 (L) 10/16/2021    MCV 95.6 02/16/2022     10/16/2021       Review of most recent BMP  Lab Results   Component Value Date/Time     06/06/2022 02:02 PM    K 4.9 06/06/2022 02:02 PM     06/06/2022 02:02 PM    CO2 28 06/06/2022 02:02 PM    BUN 41 06/06/2022 02:02 PM    CREATININE 1.90 06/06/2022 02:02 PM    GLUCOSE 99 06/06/2022 02:02 PM    CALCIUM 9.0 06/06/2022 02:02 PM        Review of most recent LFTs (and lipase if done)  Lab Results   Component Value Date    ALT 20 06/06/2022    AST 14 (L) 06/06/2022    ALKPHOS 116 06/06/2022    BILITOT 0.4 06/06/2022     No results found for: LIPASE    No results found for: INR, APTT, LCAD    Review of most recent HgbA1c  Lab Results   Component Value Date    LABA1C 6.3 06/06/2022     Lab Results   Component Value Date     06/06/2022       Nutritional assessment screen for wound healing issues:  Lab Results   Component Value Date    LABALBU 3.7 06/06/2022       @lastcovr@    Xray Result (most recent):  XR CHEST STANDARD TWO VW 02/16/2022    Narrative  Two view chest    History: Increased mucus production. COPD. Comparison: 10/14/2021    Findings: The right hemidiaphragm is mildly elevated, unchanged. There is mild  right basilar subsegmental atelectasis or scar. There are no pleural effusions  or confluent airspace opacities. The heart is normal in size and configuration. The pulmonary vascularity is within normal limits. The visualized osseous  structures are unremarkable. Impression  Mildly elevated right hemidiaphragm with increasing right basilar  subsegmental atelectasis or scar. CT Result (most recent):  CT ABDOMEN PELVIS WO IV CONTRAST 02/15/2022    Narrative  CT ABDOMEN AND PELVIS WITHOUT INTRAVENOUS CONTRAST DATED 2/15/2022. History: Left lower quadrant mass for some time although began hurting one week  ago. Dull, intermittent pain.  Does have constipation. Comparison: None    Technique:   Multiple contiguous helical CT images reconstructed at 5 mm  intervals were obtained from above the diaphragms through the ischial  tuberosities following oral contrast only. All CT scans performed at this  facility use one or all of the following: Automated exposure control, adjustment  of the mA and/or kVp according to patient's size, iterative reconstruction. Findings:  CT ABDOMEN:  Limited evaluation of the lung bases and base of the mediastinum demonstrates a  small to moderate size hiatal hernia. An elevated right hemidiaphragm is seen  with is suggested on prior chest x-ray studies. Mucous impacted distal airways  are seen are seen in the left lower lobe with mild adjacent inflammatory  appearing changes or scarring. These involve more dependent airways in the left  lower lobe. Assessment of the solid organs is limited by the lack of administered  intravenous contrast.  A subtle abnormality could be missed. The Liver is  homogeneous in attenuation. The spleen is homogeneous in attenuation. No  contour deforming mass lesions are seen of the pancreas or adrenal glands. The  gallbladder has an unremarkable CT appearance without radiopaque stones or  pericholecystic fluid/inflammatory changes. Small nonobstructing bilateral renal  stones are seen with the largest residing in the upper pole collecting system of  the right kidney measuring 4 mm. A 1 cm exophytic lesion extends off the lateral  upper to midpole cortex of the left kidney. Although poorly assessed on this  noncontrast study, this likely represents a cyst noted on prior renal ultrasound  dated 10/19/2021 given that no additional lesion is seen. The visualized loops of small bowel and colon are normal in caliber. The  appendix is seen on image 41 without acute abnormality. Mild diverticulosis is  seen in the sigmoid colon.  No free fluid, free air, or focal inflammatory  changes are History: The Male patient is 64years old presenting for chronic kidney  disease. Comparison:  None    Findings: The right kidney measures 10.1 cm and is normal in size and contour. There is  no evidence of solid or cystic lesion. No evidence of hydronephrosis is seen. There are no shadowing stones. The left kidney measures 9.6 cm and is normal in size and contour. A small  exophytic cyst is seen projecting from the upper pole measuring 1.5 cm. No  evidence of hydronephrosis is seen. There are no shadowing stones. The urinary bladder appears unremarkable. There is incidental mild ectasia of infrarenal aorta measuring up to 2.7 cm. Impression  1. Unremarkable renal ultrasound. CPT code(s) [id number]      Admission date (for inpatients): (Not on file)   * No surgery found *  * No surgery found *        ASSESSMENT/PLAN:  [unfilled]  Active Problems:    * No active hospital problems. *  Resolved Problems:    * No resolved hospital problems.  *     Patient Active Problem List    Diagnosis Date Noted    Uncontrolled type 2 diabetes mellitus with hyperglycemia ([address] 03/07/2022    Radiologic findings of lung field, abnormal 02/16/2022    Incarcerated left inguinal hernia 02/16/2022 7/12/22 s/p open LIH with mesh; Dr Styles Nurse hernia 02/16/2022    Renal cyst, left 02/16/2022    Bilateral kidney stones 02/16/2022    Elevated alkaline phosphatase level 02/15/2022    Syncope 10/15/2021    Syncope and collapse 10/14/2021    Hypoglycemia 10/14/2021    CKD (chronic kidney disease) stage 3, GFR 30-59 ml/min ([facility]) 06/21/2021    Sinus tachycardia 05/20/2020    Coronary artery disease involving native coronary artery of native heart without angina pectoris 05/25/2017    Tobacco abuse 05/25/2017    Basal cell carcinoma of scalp 09/14/2015    Type 2 diabetes mellitus with diabetic polyneuropathy, without long-term current use of insulin ([address] 07/01/2015    COPD (chronic obstructive pulmonary disease) ([facility] 07/01/2015    Essential hypertension, benign 07/01/2015    Dyslipidemia 07/01/2015          Number and Complexity of Problems addressed and   Risks of complications and/or morbidity of management            Uncontrolled diabetes  We delayed his surgery slightly to let him get better control of his diabetes mellitus. His most recent hemoglobin A1c was 13.8 on 2/16/22 and his creatinine was elevated at 1.8. He reported that he and his primary care physician have attributed this elevated hemoglobin A1c to his aggressively eating Snickers bars and candy  He reports he has stopped doing this as of February and has a hemoglobin A1c pending in late June with his next primary care appointment    Encourage close follow-up with primary care  Encourage strict adherence to diabetic medical regimen    As of 6/6/22 his HgbA1c was 6.3. Large LIH containing sigmoid colon  He is S/P LIH with mesh on 7/12/22  >doing well  >Take dressing off at home in 2 weeks  >Discussed post op lifting restrictions for 8 wk total  >Call for any concerns                      I have personally performed a face-to-face diagnostic evaluation and management  service on this patient. I have independently seen the patient. I have independently obtained the above history from the patient/family. I have independently examined the patient with above findings. I have independently reviewed data/labs for this patient and developed the above plan of care (MDM).       Signed: JONATHAN Carlin CNP  7/25/2022    2:53 PM

## 2022-08-02 ENCOUNTER — OFFICE VISIT (OUTPATIENT)
Dept: UROLOGY | Age: 77
End: 2022-08-02
Payer: MEDICARE

## 2022-08-02 DIAGNOSIS — R33.8 POSTOPERATIVE URINARY RETENTION: Primary | ICD-10-CM

## 2022-08-02 DIAGNOSIS — N99.89 POSTOPERATIVE URINARY RETENTION: Primary | ICD-10-CM

## 2022-08-02 DIAGNOSIS — Z87.442 HISTORY OF KIDNEY STONES: ICD-10-CM

## 2022-08-02 LAB
BILIRUBIN, URINE, POC: NEGATIVE
BLOOD URINE, POC: NEGATIVE
GLUCOSE URINE, POC: NEGATIVE
KETONES, URINE, POC: NEGATIVE
LEUKOCYTE ESTERASE, URINE, POC: NEGATIVE
NITRITE, URINE, POC: NEGATIVE
PH, URINE, POC: 6 (ref 4.6–8)
PROTEIN,URINE, POC: NEGATIVE
PVR, POC: 80 CC
SPECIFIC GRAVITY, URINE, POC: 1.02 (ref 1–1.03)
URINALYSIS CLARITY, POC: NORMAL
URINALYSIS COLOR, POC: NORMAL
UROBILINOGEN, POC: NORMAL

## 2022-08-02 PROCEDURE — 1123F ACP DISCUSS/DSCN MKR DOCD: CPT | Performed by: NURSE PRACTITIONER

## 2022-08-02 PROCEDURE — G8417 CALC BMI ABV UP PARAM F/U: HCPCS | Performed by: NURSE PRACTITIONER

## 2022-08-02 PROCEDURE — 99213 OFFICE O/P EST LOW 20 MIN: CPT | Performed by: NURSE PRACTITIONER

## 2022-08-02 PROCEDURE — 81003 URINALYSIS AUTO W/O SCOPE: CPT | Performed by: NURSE PRACTITIONER

## 2022-08-02 PROCEDURE — G8428 CUR MEDS NOT DOCUMENT: HCPCS | Performed by: NURSE PRACTITIONER

## 2022-08-02 PROCEDURE — 4004F PT TOBACCO SCREEN RCVD TLK: CPT | Performed by: NURSE PRACTITIONER

## 2022-08-02 PROCEDURE — 51798 US URINE CAPACITY MEASURE: CPT | Performed by: NURSE PRACTITIONER

## 2022-08-02 NOTE — PROGRESS NOTES
[address], [address]  [phone]          Pedro Jeter  : 1945    Chief Complaint   Patient presents with    Follow-up          HPI     Pedro Jeter is a 76 y.o. male  Returns today for follow-up on episode of urinary retention after undergoing left inguinal hernia repair 2022. Patient returned to the ER the following day with inability to void and over 1000 mL of urine and strength in the bladder. I saw him a couple weeks ago and catheter was removed. Patient reports since catheter has been removed his voiding has much improved. He has been on Flomax and will continue that. He has seen Dr. Gladys Ricci in the past due to stone disease and a renal cyst.  He has not had any significant issues with those in quite some time. PSAs have been obtained by PCP and he tells me they usually run in the 2 range. I have one in the system from 2019 that came back to be 2.6.     Past Medical History:   Diagnosis Date    CAD (coronary artery disease)     non-obstructive, Dr John Bañuelos-  denies hx of mi, never had heart cath    Chronic airway obstruction, not elsewhere classified 2022    symbicort inh- very seldom, does not require supplemental oxygen- smokes 0.75- ppd since age 11    Essential hypertension, benign 2015    H/O echocardiogram 10/15/2021    LVEF 60-65%    History of colonic polyps     Inflammatory and toxic neuropathy, unspecified 2015    Kidney disease     Kidney stone     Neoplasm of uncertain behavior of skin     Neuralgia, neuritis, and radiculitis, unspecified     Neuropathy of both feet     Osteoarthritis     Other and unspecified hyperlipidemia 2015    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     14 hrs in recovery, hypotensive  (R shoulder arthroplasty), hx Left side paralysis of diaphram Type II or unspecified type diabetes mellitus without mention of complication, uncontrolled 07/01/2015    PO meds- fbs 108- A1C 6.3 (6/2022) - recognizes hypo s/s but has no checked when it happens \"I just eat something\"    Ulcer of ankle ([address] 07/01/2015     Past Surgical History:   Procedure Laterality Date    COLONOSCOPY  2007    w/ Dr. Tate Blackwood Left 7/12/2022    OPEN LEFT INGUINAL HERNIA REPAIR WITH MESH performed by Danay Crespo MD at [address] [place] [address]       Current Outpatient Medications   Medication Sig Dispense Refill    dilTIAZem (TIAZAC) 120 MG extended release capsule Take 1 capsule by mouth every morning 90 capsule 3    docusate sodium (COLACE) 100 MG capsule Take 200 mg by mouth at bedtime      budesonide-formoterol (SYMBICORT) 160-4.5 MCG/ACT AERO Inhale 2 puffs into the lungs as needed      Lancets MISC Please dispense lancets for patient to check FSBS four times a day      aspirin 81 MG EC tablet Take 81 mg by mouth at bedtime 7/11/22 - instructed to hold for surgery- last dose 7/10/22 at bedtime      atorvastatin (LIPITOR) 20 MG tablet Take 20 mg by mouth at bedtime TAKE ONE TABLET BY MOUTH ONE TIME DAILY      diphenhydrAMINE (BENADRYL) 25 MG capsule Take 25 mg by mouth as needed       DULoxetine (CYMBALTA) 60 MG extended release capsule Take 60 mg by mouth 2 times daily TAKE ONE CAPSULE BY MOUTH TWICE A DAY      gabapentin (NEURONTIN) 600 MG tablet Take 600 mg by mouth 3 times daily. TAKE ONE TABLET BY MOUTH THREE TIMES A DAY      glipiZIDE (GLUCOTROL XL) 5 MG extended release tablet Take 5 mg by mouth daily      pioglitazone (ACTOS) 30 MG tablet Take 30 mg by mouth at bedtime       SITagliptin (JANUVIA) 50 MG tablet TAKE ONE TABLET BY MOUTH ONE TIME DAILY      tamsulosin (FLOMAX) 0.4 MG capsule Take 0.8 mg by mouth every evening TAKE TWO CAPSULES BY MOUTH ONE TIME DAILY AFTER EVENING MEAL       No current facility-administered medications for this visit. UA - Micro  WBC - 0  RBC - 0  Bacteria - 0  Epith - 0    PHYSICAL EXAM    GENERAL: No acute distress, Awake, Alert, Oriented X 3, Gait normal  ABDOMEN: soft, non tender, non-distended, positive bowel sounds, no organomegaly, no palpable masses, no guarding, no rebound tenderness  SKIN: No rash, no erythema, no lacerations or abrasions, no ecchymosis  MUSCULOSKELETAL - MAEW, no edema     PVR- 80ml    Assessment and Plan    ICD-10-CM    1. Postoperative urinary retention  N99.89 AMB POC PVR, ADELFO,POST-VOID RES,US,NON-IMAGING    R33.8 AMB POC URINALYSIS DIP STICK AUTO W/O MICRO      2. History of kidney stones  Z87.442         PLAN:  Patient is back to his baseline  He will continue Flomax  He has been getting Flomax for PCP and feels that he can follow-up there  I have advised him that if he has any worsening symptoms call our office we will be glad to see him  He verbalizes understanding. Also advised him that if he has any other type of outpatient procedure requiring anesthesia to let them know about the retention. Cecilio Chang, NP, APRN - NP  Dr. Mujica is supervising physician today and he approves plan of care. Return if symptoms worsen or fail to improve. Elements of this note have been dictated using speech recognition software. Although reviewed, errors of speech recognition may have occurred.

## 2022-09-13 RX ORDER — DULOXETIN HYDROCHLORIDE 60 MG/1
CAPSULE, DELAYED RELEASE ORAL
Qty: 180 CAPSULE | Refills: 3 | Status: SHIPPED | OUTPATIENT
Start: 2022-09-13

## 2022-09-13 RX ORDER — DULOXETIN HYDROCHLORIDE 60 MG/1
CAPSULE, DELAYED RELEASE ORAL
Qty: 180 CAPSULE | Refills: 3 | OUTPATIENT
Start: 2022-09-13

## 2022-11-01 ASSESSMENT — ENCOUNTER SYMPTOMS
ABDOMINAL PAIN: 0
SHORTNESS OF BREATH: 0
SORE THROAT: 0
PHOTOPHOBIA: 0
CONSTIPATION: 0
DIARRHEA: 0
ABDOMINAL DISTENTION: 0
COUGH: 0

## 2022-11-01 NOTE — PROGRESS NOTES
[facility] CARDIOLOGY  [address], [address] [facility], [address] [place] [address]  PHONE: [phone]        NAME:  Rosario Zimmer  : 1945  MRN: [id number]     PCP:  Ginny Manuel MD      SUBJECTIVE:   Rosario Zimmer is a 76 y.o. male seen for a follow up visit regarding the following:     Chief Complaint   Patient presents with    Hypertension     6 month follow up    Hyperlipidemia       HPI:   Previously followed by [facility]iology. Known CAD s/p cath  at [facility] showing mild non-obstructive CAD. He has chronic SOB and MOONEY with COPD but started smoking again in the past few years, still  smoking ~10-12 cigs per day, no intentions at present but counseled again anyway today. ... Doing well recently without interval angina, CHF, palpitations, edema. Carotids showed less than 50% bilateral internal carotid disease with a left vertebral stenosis. CT of the brain was negative. Echo as noted below. Previous postural hypotension resolved now, hydrating as tolerated. He is encouraged to drink at least 6 to 8 glasses of fluid daily and to minimize alcohol and caffeine use. Echo 10/2021:     Left Ventricle  Normal cavity size, wall thickness and systolic function (ejection fraction normal). Wall motion: normal. The estimated EF is 60  - 65%. There is mild (grade 1) left ventricular diastolic dysfunction E/W'=05. Left Atrium  Normal cavity size. Left Atrium volume index is 21 mL/m2. Interatrial Septum  No interatrial shunt visualized on color doppler. Right Ventricle  Normal cavity size and global systolic function. Right Atrium  Normal cavity size. Aortic Valve  Trileaflet valve structure, no stenosis and no regurgitation. Mild aortic valve sclerosis. Normal aortic leaflet mobility. Mitral Valve  Normal valve structure, no stenosis and no regurgitation. Tricuspid Valve  Normal valve structure, no stenosis and no regurgitation.      Pulmonic Valve Pulmonic valve not well visualized, but normal doppler findings. No stenosis. Pulmonary Artery  Normal pulmonary arteries. Aorta  Normal aortic root. IVC/Hepatic Veins  Normal structure. Pericardium  No evidence of pericardial effusion. Carotid Duplex 10/2021:   1. Less than 50% stenosis of the right internal carotid artery. 2. Less than 50% stenosis of the left internal carotid artery. 3. Left vertebral artery stenosis      He had left inguinal hernia surgery since last visit without adverse cardiac event. Past Medical History, Past Surgical History, Family history, Social History, and Medications were all reviewed with the patient today and updated as necessary. Current Outpatient Medications   Medication Sig Dispense Refill    DULoxetine (CYMBALTA) 60 MG extended release capsule TAKE ONE CAPSULE BY MOUTH TWICE A  capsule 3    dilTIAZem (TIAZAC) 120 MG extended release capsule Take 1 capsule by mouth every morning 90 capsule 3    docusate sodium (COLACE) 100 MG capsule Take 200 mg by mouth at bedtime      budesonide-formoterol (SYMBICORT) 160-4.5 MCG/ACT AERO Inhale 2 puffs into the lungs as needed      Lancets MISC Please dispense lancets for patient to check FSBS four times a day      aspirin 81 MG EC tablet Take 81 mg by mouth at bedtime 7/11/22 - instructed to hold for surgery- last dose 7/10/22 at bedtime      atorvastatin (LIPITOR) 20 MG tablet Take 20 mg by mouth at bedtime TAKE ONE TABLET BY MOUTH ONE TIME DAILY      diphenhydrAMINE (BENADRYL) 25 MG capsule Take 25 mg by mouth as needed       gabapentin (NEURONTIN) 600 MG tablet Take 600 mg by mouth 3 times daily.  TAKE ONE TABLET BY MOUTH THREE TIMES A DAY      glipiZIDE (GLUCOTROL XL) 5 MG extended release tablet Take 5 mg by mouth daily      pioglitazone (ACTOS) 30 MG tablet Take 30 mg by mouth at bedtime       SITagliptin (JANUVIA) 50 MG tablet TAKE ONE TABLET BY MOUTH ONE TIME DAILY      tamsulosin (FLOMAX) 0.4 MG capsule Take 0.8 mg by mouth every evening TAKE TWO CAPSULES BY MOUTH ONE TIME DAILY AFTER EVENING MEAL       No current facility-administered medications for this visit.             Allergies   Allergen Reactions    Codeine Nausea And Vomiting       Patient Active Problem List    Diagnosis Date Noted    Uncontrolled type 2 diabetes mellitus with hyperglycemia ([facility] 03/07/2022    Radiologic findings of lung field, abnormal 02/16/2022    Incarcerated left inguinal hernia 02/16/2022     Overview Note:     7/12/22 s/p open LIH with mesh; Dr Joe Garcia      Hiatal hernia 02/16/2022    Renal cyst, left 02/16/2022    Bilateral kidney stones 02/16/2022    Elevated alkaline phosphatase level 02/15/2022    Syncope 10/15/2021    Syncope and collapse 10/14/2021    Hypoglycemia 10/14/2021    CKD (chronic kidney disease) stage 3, GFR 30-59 ml/min ([facility]) 06/21/2021    Sinus tachycardia 05/20/2020    Coronary artery disease involving native coronary artery of native heart without angina pectoris 05/25/2017    Tobacco abuse 05/25/2017    Basal cell carcinoma of scalp 09/14/2015    Type 2 diabetes mellitus with diabetic polyneuropathy, without long-term current use of insulin ([facility] 07/01/2015    COPD (chronic obstructive pulmonary disease) ([facility] 07/01/2015    Essential hypertension, benign 07/01/2015    Dyslipidemia 07/01/2015        Past Surgical History:   Procedure Laterality Date    COLONOSCOPY  2007    w/ Dr. Liset Wright Left 7/12/2022    OPEN LEFT INGUINAL HERNIA REPAIR WITH MESH performed by Adam Ott MD at [address] [place] [address]         Family History   Problem Relation Age of Onset    Hypertension Father         Social History     Tobacco Use    Smoking status: Every Day     Packs/day: 0.75     Years: 63.00     Pack years: 47.25     Types: Cigarettes     Start date: 1958    Smokeless tobacco: Never   Substance Use Topics    Alcohol use: Not Currently     Alcohol/week: 0.0 standard drinks ROS:    Review of Systems   Constitutional:  Negative for appetite change, chills, diaphoresis and fatigue. HENT:  Negative for congestion, mouth sores, nosebleeds, sore throat and tinnitus. Eyes:  Negative for photophobia and visual disturbance. Respiratory:  Negative for cough and shortness of breath. Cardiovascular:  Negative for chest pain, palpitations and leg swelling. Gastrointestinal:  Negative for abdominal distention, abdominal pain, constipation and diarrhea. Endocrine: Negative for cold intolerance, heat intolerance, polydipsia and polyuria. Genitourinary:  Negative for dysuria and hematuria. Musculoskeletal:  Negative for arthralgias, joint swelling and myalgias. Skin:  Negative for rash. Allergic/Immunologic: Negative for environmental allergies and food allergies. Neurological:  Negative for dizziness, seizures, syncope and light-headedness. Hematological:  Negative for adenopathy. Does not bruise/bleed easily. Psychiatric/Behavioral:  Negative for agitation, behavioral problems, dysphoric mood and hallucinations. The patient is not nervous/anxious. PHYSICAL EXAM:     Vitals:    11/02/22 1130   BP: 130/78   Pulse: 89   Weight: 217 lb (98.4 kg)   Height: 5' 11\" (1.803 m)      Wt Readings from Last 3 Encounters:   11/02/22 217 lb (98.4 kg)   07/25/22 202 lb (91.6 kg)   07/13/22 202 lb (91.6 kg)      BP Readings from Last 3 Encounters:   11/02/22 130/78   07/13/22 125/86   07/12/22 120/62        Physical Exam  Constitutional:       Appearance: Normal appearance. He is normal weight. HENT:      Head: Normocephalic and atraumatic. Nose: Nose normal.      Mouth/Throat:      Mouth: Mucous membranes are moist.      Pharynx: Oropharynx is clear. Eyes:      Extraocular Movements: Extraocular movements intact. Pupils: Pupils are equal, round, and reactive to light. Neck:      Vascular: No carotid bruit or JVD.    Cardiovascular:      Rate and Rhythm: Normal rate and regular rhythm. Heart sounds: No murmur heard. No friction rub. No gallop. Pulmonary:      Effort: Pulmonary effort is normal.      Breath sounds: Normal breath sounds. No wheezing or rhonchi. Abdominal:      General: Abdomen is flat. Bowel sounds are normal. There is no distension. Palpations: Abdomen is soft. Tenderness: There is no abdominal tenderness. Musculoskeletal:         General: No swelling. Normal range of motion. Cervical back: Normal range of motion and neck supple. No tenderness. Skin:     General: Skin is warm and dry. Neurological:      General: No focal deficit present. Mental Status: He is alert and oriented to person, place, and time. Mental status is at baseline. Psychiatric:         Mood and Affect: Mood normal.         Behavior: Behavior normal.        Medical problems and test results were reviewed with the patient today.        Lab Results   Component Value Date    CHOL 104 06/06/2022    CHOL 125 09/20/2021    CHOL 145 06/07/2021     Lab Results   Component Value Date    TRIG 91 06/06/2022    TRIG 98 09/20/2021    TRIG 153 (H) 06/07/2021     Lab Results   Component Value Date    HDL 50 06/06/2022    HDL 42 09/20/2021    HDL 37 (L) 06/07/2021     Lab Results   Component Value Date    LDLCALC 35.8 06/06/2022    LDLCALC 65 09/20/2021    LDLCALC 81 06/07/2021     Lab Results   Component Value Date    LABVLDL 18.2 06/06/2022    LABVLDL 34 07/22/2020    LABVLDL 20 12/10/2019    VLDL 18 09/20/2021    VLDL 27 06/07/2021    VLDL 25 01/11/2021     Lab Results   Component Value Date    CHOLHDLRATIO 2.1 06/06/2022        Lab Results   Component Value Date/Time     06/06/2022 02:02 PM    K 4.9 06/06/2022 02:02 PM     06/06/2022 02:02 PM    CO2 28 06/06/2022 02:02 PM    BUN 41 06/06/2022 02:02 PM    CREATININE 1.90 06/06/2022 02:02 PM    GLUCOSE 99 06/06/2022 02:02 PM    CALCIUM 9.0 06/06/2022 02:02 PM         No results for input(s): WBC, HGB, HCT, MCV, PLT in the last 720 hours. Lab Results   Component Value Date    LABA1C 6.3 06/06/2022     Lab Results   Component Value Date     06/06/2022        No results found for: BNP     Lab Results   Component Value Date    TSH 1.550 01/11/2021        Results for orders placed or performed in visit on 11/02/22   EKG 12 lead    Impression    Sinus  Rhythm 89 bpm  Normal axis and intervals  Normal ST and T waves  Occasional PAC  WITHIN NORMAL LIMITS        ASSESSMENT and PLAN     Diagnoses and all orders for this visit:      Sinus tachycardia- resolved, stay well hydrated, continue cardizem CD as tolerated. Needs to stay hydrated to avoid postural hypotension. Discussed today. Dyslipidemia/hyperlipidemia- stable, continue meds per PCP- see above. Essential hypertension, benign-previously  having problems with intravascular volume depletion and associated hypotension, see above - dizziness improved - stay hydrated, avoid salt, see above. Chronic obstructive pulmonary disease  ([address]- stable SOB/MOONEY, still smoking about 10 per day. ..cessation encouraged      Controlled type 2 diabetes mellitus without complication ([address]- stable in the past, compliant with meds, but A1c markedly increased recently, see above. Encouraged him to discuss with PCP soon. Coronary artery disease involving native coronary artery of native heart without angina pectoris - no angina, continue meds       Tobacco abuse- Still smoking about 10-12 cigs daily. .. The importance of smoking  cessation discussed for ~3 min with the patient today. The patient understands the benefits of cessation and the risks of continued smoking. Syncope-multifactorial- glucose of 40 at time of syncope, poor p.o. intake as well, see above. Encouraged at least 60 to 80 ounces of fluid intake daily, minimize caffeine and alcohol.  Call with recurrent symptoms and consider decreasing Cardizem or converting  to very low-dose Toprol-XL. Return in about 6 months (around 5/2/2023).          Aniket Castaneda MD  11/2/2022  11:39 AM

## 2022-11-02 ENCOUNTER — OFFICE VISIT (OUTPATIENT)
Dept: CARDIOLOGY CLINIC | Age: 77
End: 2022-11-02
Payer: MEDICARE

## 2022-11-02 VITALS
HEART RATE: 89 BPM | BODY MASS INDEX: 30.38 KG/M2 | DIASTOLIC BLOOD PRESSURE: 78 MMHG | HEIGHT: 71 IN | SYSTOLIC BLOOD PRESSURE: 130 MMHG | WEIGHT: 217 LBS

## 2022-11-02 DIAGNOSIS — R00.0 SINUS TACHYCARDIA: ICD-10-CM

## 2022-11-02 DIAGNOSIS — E78.5 DYSLIPIDEMIA: ICD-10-CM

## 2022-11-02 DIAGNOSIS — I25.10 CORONARY ARTERY DISEASE INVOLVING NATIVE CORONARY ARTERY OF NATIVE HEART WITHOUT ANGINA PECTORIS: Primary | ICD-10-CM

## 2022-11-02 DIAGNOSIS — E11.42 TYPE 2 DIABETES MELLITUS WITH DIABETIC POLYNEUROPATHY, WITHOUT LONG-TERM CURRENT USE OF INSULIN (HCC): ICD-10-CM

## 2022-11-02 DIAGNOSIS — Z72.0 TOBACCO ABUSE: ICD-10-CM

## 2022-11-02 DIAGNOSIS — J44.9 CHRONIC OBSTRUCTIVE PULMONARY DISEASE, UNSPECIFIED COPD TYPE (HCC): ICD-10-CM

## 2022-11-02 DIAGNOSIS — I10 ESSENTIAL HYPERTENSION, BENIGN: ICD-10-CM

## 2022-11-02 DIAGNOSIS — N18.30 STAGE 3 CHRONIC KIDNEY DISEASE, UNSPECIFIED WHETHER STAGE 3A OR 3B CKD (HCC): ICD-10-CM

## 2022-11-02 PROCEDURE — 3074F SYST BP LT 130 MM HG: CPT | Performed by: INTERNAL MEDICINE

## 2022-11-02 PROCEDURE — 4004F PT TOBACCO SCREEN RCVD TLK: CPT | Performed by: INTERNAL MEDICINE

## 2022-11-02 PROCEDURE — 3078F DIAST BP <80 MM HG: CPT | Performed by: INTERNAL MEDICINE

## 2022-11-02 PROCEDURE — G8427 DOCREV CUR MEDS BY ELIG CLIN: HCPCS | Performed by: INTERNAL MEDICINE

## 2022-11-02 PROCEDURE — 1123F ACP DISCUSS/DSCN MKR DOCD: CPT | Performed by: INTERNAL MEDICINE

## 2022-11-02 PROCEDURE — 3023F SPIROM DOC REV: CPT | Performed by: INTERNAL MEDICINE

## 2022-11-02 PROCEDURE — 93000 ELECTROCARDIOGRAM COMPLETE: CPT | Performed by: INTERNAL MEDICINE

## 2022-11-02 PROCEDURE — 99214 OFFICE O/P EST MOD 30 MIN: CPT | Performed by: INTERNAL MEDICINE

## 2022-11-02 PROCEDURE — G8417 CALC BMI ABV UP PARAM F/U: HCPCS | Performed by: INTERNAL MEDICINE

## 2022-11-02 PROCEDURE — 3044F HG A1C LEVEL LT 7.0%: CPT | Performed by: INTERNAL MEDICINE

## 2022-11-02 PROCEDURE — G8484 FLU IMMUNIZE NO ADMIN: HCPCS | Performed by: INTERNAL MEDICINE

## 2022-11-04 ENCOUNTER — HOSPITAL ENCOUNTER (EMERGENCY)
Age: 77
Discharge: HOME | End: 2022-11-04
Attending: EMERGENCY MEDICINE
Payer: MEDICARE

## 2022-11-04 ENCOUNTER — APPOINTMENT (OUTPATIENT)
Dept: GENERAL RADIOLOGY | Age: 77
End: 2022-11-04
Payer: MEDICARE

## 2022-11-04 ENCOUNTER — APPOINTMENT (OUTPATIENT)
Dept: CT IMAGING | Age: 77
End: 2022-11-04
Payer: MEDICARE

## 2022-11-04 VITALS
SYSTOLIC BLOOD PRESSURE: 136 MMHG | BODY MASS INDEX: 30.38 KG/M2 | RESPIRATION RATE: 16 BRPM | HEART RATE: 60 BPM | HEIGHT: 71 IN | OXYGEN SATURATION: 95 % | WEIGHT: 217 LBS | DIASTOLIC BLOOD PRESSURE: 69 MMHG | TEMPERATURE: 98 F

## 2022-11-04 DIAGNOSIS — S20.212A CONTUSION OF LEFT CHEST WALL, INITIAL ENCOUNTER: ICD-10-CM

## 2022-11-04 DIAGNOSIS — S01.01XA LACERATION OF SCALP, INITIAL ENCOUNTER: Primary | ICD-10-CM
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
## 2022-11-04 PROCEDURE — 99284 EMERGENCY DEPT VISIT MOD MDM: CPT

## 2022-11-04 PROCEDURE — 70450 CT HEAD/BRAIN W/O DYE: CPT

## 2022-11-04 PROCEDURE — 71101 X-RAY EXAM UNILAT RIBS/CHEST: CPT

## 2022-11-04 PROCEDURE — 12002 RPR S/N/AX/GEN/TRNK2.6-7.5CM: CPT

## 2022-11-04 PROCEDURE — 72125 CT NECK SPINE W/O DYE: CPT

## 2022-11-04 PROCEDURE — 6370000000 HC RX 637 (ALT 250 FOR IP): Performed by: EMERGENCY MEDICINE

## 2022-11-04 RX ADMIN — Medication 3 ML: at 11:06

## 2022-11-04 ASSESSMENT — ENCOUNTER SYMPTOMS
BOWEL INCONTINENCE: 0
ABDOMINAL PAIN: 0
VISUAL CHANGE: 0
BACK PAIN: 0
NAUSEA: 0
PHOTOPHOBIA: 0
CHOKING: 0
STRIDOR: 0
COLOR CHANGE: 0
EYE REDNESS: 0
VOMITING: 0
CHEST TIGHTNESS: 0
VOICE CHANGE: 0
TROUBLE SWALLOWING: 0

## 2022-11-04 ASSESSMENT — PAIN SCALES - GENERAL
PAINLEVEL_OUTOF10: 5
PAINLEVEL_OUTOF10: 5

## 2022-11-04 ASSESSMENT — PAIN DESCRIPTION - DESCRIPTORS: DESCRIPTORS: SORE

## 2022-11-04 ASSESSMENT — PAIN DESCRIPTION - LOCATION
LOCATION: CHEST
LOCATION: CHEST

## 2022-11-04 ASSESSMENT — PAIN DESCRIPTION - PAIN TYPE: TYPE: ACUTE PAIN

## 2022-11-04 NOTE — DISCHARGE INSTRUCTIONS
Return to the ER in 14 days for staple removal  Expect to have some stiffness and soreness for a couple days  Follow-up with your primary care physician  Return to the ER for any new, worsening or life-threatening symptoms

## 2022-11-04 NOTE — ED TRIAGE NOTES
Pt arrives via EMS from home after he slipped out of bed this morning and hit his head. Denies LOC, no thinners. Denies neck or back pain. Small laceration noted to left side of head by EMS, currently wrapped, bleeding controlled.      HR: 60  O2: 96  BP: 154/82  BGL: 153  Temp: 98.5

## 2022-11-04 NOTE — ED PROVIDER NOTES
Emergency Department Provider Note                   PCP:                Samantha Hollingsworth MD               Age: 76 y.o. Sex: male     No diagnosis found. DISPOSITION          MDM  Number of Diagnoses or Management Options  Diagnosis management comments: Will obtain CT scan of head as well as cervical spine. Also obtain x-rays of left ribs. 11:57 AM  CT scan of cervical spine as well as head showed no acute abnormalities. X-ray ribs show no obvious fracture. Wound repaired at the base with staples. Plan to discharge home. Amount and/or Complexity of Data Reviewed  Tests in the radiology section of CPT®: ordered and reviewed  Review and summarize past medical records: yes  Independent visualization of images, tracings, or specimens: yes (EKG interpretation: Sinus rhythm, rate of 63, normal axis, no blocks, no ST segment elevation or depression noted)    Risk of Complications, Morbidity, and/or Mortality  Presenting problems: moderate  Diagnostic procedures: low  Management options: low               Orders Placed This Encounter   Procedures    CT HEAD WO CONTRAST    XR RIBS LEFT INCLUDE CHEST (MIN 3 VIEWS)        Medications - No data to display    New Prescriptions    No medications on file        Lizbeth Medrano is a 76 y.o. male who presents to the Emergency Department with chief complaint of    Chief Complaint   Patient presents with    Fall      Patient presents the ER after a fall. Patient states she just \"rolled out of bed\" and fell and hit his head. Reports he did land on the left part of his chest as well. Denies any loss of consciousness. Was able to get up with some assistance. EMS noted some bleeding from the laceration to the left posterior aspect of his head. Denies any vomiting. The history is provided by the patient. Fall  The accident occurred Less than 1 hour ago. He fell from a height of 3 to 5 ft. He landed on A hard floor. The point of impact was the head.  The pain is present in the head. The pain is at a severity of 2/10. The pain is mild. He was Ambulatory at the scene. Pertinent negatives include no visual change, no fever, no abdominal pain, no bowel incontinence, no nausea, no vomiting, no headaches, no hearing loss and no loss of consciousness. Review of Systems   Constitutional:  Negative for fatigue and fever. HENT:  Negative for congestion, dental problem, trouble swallowing and voice change. Eyes:  Negative for photophobia and redness. Respiratory:  Negative for choking, chest tightness and stridor. Cardiovascular:  Negative for palpitations and leg swelling. Gastrointestinal:  Negative for abdominal pain, bowel incontinence, nausea and vomiting. Endocrine: Negative for polyphagia and polyuria. Genitourinary:  Negative for decreased urine volume and urgency. Musculoskeletal:  Negative for back pain and gait problem. Skin:  Negative for color change and pallor. Allergic/Immunologic: Negative for food allergies and immunocompromised state. Neurological:  Negative for loss of consciousness, weakness, light-headedness and headaches. Hematological:  Negative for adenopathy. Does not bruise/bleed easily. Psychiatric/Behavioral:  Negative for behavioral problems and confusion. All other systems reviewed and are negative.     Past Medical History:   Diagnosis Date    CAD (coronary artery disease)     non-obstructive, Dr Nabil Brizuela-  denies hx of mi, never had heart cath    Chronic airway obstruction, not elsewhere classified 07/11/2022    symbicort inh- very seldom, does not require supplemental oxygen- smokes 0.75- ppd since age 11    Essential hypertension, benign 07/01/2015    H/O echocardiogram 10/15/2021    LVEF 60-65%    History of colonic polyps     Inflammatory and toxic neuropathy, unspecified 07/01/2015    Kidney disease     Kidney stone     Neoplasm of uncertain behavior of skin     Neuralgia, neuritis, and radiculitis, unspecified     Neuropathy of both feet     Osteoarthritis     Other and unspecified hyperlipidemia 07/01/2015    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 Dereje Cantu Left 7/12/2022    OPEN LEFT INGUINAL HERNIA REPAIR WITH MESH performed by Kirill Palacios MD at [address] [place] [address]          Family History   Problem Relation Age of Onset    Hypertension Father         Social History     Socioeconomic History    Marital status:    Tobacco Use    Smoking status: Every Day     Packs/day: 0.75     Years: 63.00     Pack years: 47.25     Types: Cigarettes     Start date: 1958    Smokeless tobacco: Never   Vaping Use    Vaping Use: Never used   Substance and Sexual Activity    Alcohol use: Not Currently     Alcohol/week: 0.0 standard drinks     Social Determinants of Health     Financial Resource Strain: Low Risk     Difficulty of Paying Living Expenses: Not hard at all   Food Insecurity: No Food Insecurity    Worried About Running Out of Food in the Last Year: Never true    Ran Out of Food in the Last Year: Never true         Codeine     Previous Medications    ASPIRIN 81 MG EC TABLET    Take 81 mg by mouth at bedtime 7/11/22 - instructed to hold for surgery- last dose 7/10/22 at bedtime    ATORVASTATIN (LIPITOR) 20 MG TABLET    Take 20 mg by mouth at bedtime TAKE ONE TABLET BY MOUTH ONE TIME DAILY    BUDESONIDE-FORMOTEROL (SYMBICORT) 160-4.5 MCG/ACT AERO    Inhale 2 puffs into the lungs as needed    DILTIAZEM (TIAZAC) 120 MG EXTENDED RELEASE CAPSULE    Take 1 capsule by mouth every morning    DIPHENHYDRAMINE (BENADRYL) 25 MG CAPSULE    Take 25 mg by mouth as needed     DOCUSATE SODIUM (COLACE) 100 MG CAPSULE    Take 200 mg by mouth at bedtime    DULOXETINE (CYMBALTA) 60 MG EXTENDED RELEASE CAPSULE    TAKE ONE CAPSULE BY MOUTH TWICE A DAY    GABAPENTIN (NEURONTIN) 600 MG TABLET    Take 600 mg by mouth 3 times daily. TAKE ONE TABLET BY MOUTH THREE TIMES A DAY    GLIPIZIDE (GLUCOTROL XL) 5 MG EXTENDED RELEASE TABLET    Take 5 mg by mouth daily    LANCETS MISC    Please dispense lancets for patient to check FSBS four times a day    PIOGLITAZONE (ACTOS) 30 MG TABLET    Take 30 mg by mouth at bedtime     SITAGLIPTIN (JANUVIA) 50 MG TABLET    TAKE ONE TABLET BY MOUTH ONE TIME DAILY    TAMSULOSIN (FLOMAX) 0.4 MG CAPSULE    Take 0.8 mg by mouth every evening TAKE TWO CAPSULES BY MOUTH ONE TIME DAILY AFTER EVENING MEAL        Vitals signs and nursing note reviewed. Patient Vitals for the past 4 hrs:   Temp Pulse Resp BP SpO2   11/04/22 1014 98 °F (36.7 °C) 56 18 (!) 153/117 97 %          Physical Exam  Vitals and nursing note reviewed. Constitutional:       Appearance: Normal appearance. HENT:      Head: Normocephalic. Right Ear: External ear normal.      Left Ear: External ear normal.      Nose: Nose normal. No congestion or rhinorrhea. Eyes:      General:         Right eye: No discharge. Left eye: No discharge. Extraocular Movements: Extraocular movements intact. Pupils: Pupils are equal, round, and reactive to light. Cardiovascular:      Rate and Rhythm: Normal rate and regular rhythm. Pulses: Normal pulses. Heart sounds: Normal heart sounds. Pulmonary:      Effort: Pulmonary effort is normal.      Breath sounds: Normal breath sounds. Chest:      Chest wall: Tenderness present.    Abdominal:      General: Abdomen is flat. There is no distension. Palpations: Abdomen is soft. There is no mass. Tenderness: There is no abdominal tenderness. Musculoskeletal:         General: No swelling, tenderness or signs of injury. Normal range of motion. Cervical back: Normal range of motion and neck supple. Skin:     General: Skin is warm. Capillary Refill: Capillary refill takes less than 2 seconds. Coloration: Skin is not jaundiced or pale. Findings: No bruising or erythema. Neurological:      General: No focal deficit present. Mental Status: He is alert and oriented to person, place, and time. Cranial Nerves: No cranial nerve deficit. Sensory: No sensory deficit. Motor: No weakness.         Lac Repair    Date/Time: 11/4/2022 11:57 AM  Performed by: Cindi Polo MD  Authorized by: Cindi Polo MD     Consent:     Consent obtained:  Verbal    Consent given by:  Patient    Risks discussed:  Infection  Universal protocol:     Procedure explained and questions answered to patient or proxy's satisfaction: yes      Test results available: yes      Imaging studies available: yes      Patient identity confirmed:  Verbally with patient  Anesthesia:     Anesthesia method:  Topical application    Topical anesthetic:  LET  Laceration details:     Location:  Scalp    Length (cm):  3    Depth (mm):  2  Pre-procedure details:     Preparation:  Patient was prepped and draped in usual sterile fashion  Exploration:     Limited defect created (wound extended): no      Hemostasis achieved with:  Direct pressure    Imaging obtained comment:  CT head    Imaging outcome: foreign body not noted      Wound extent: no muscle damage noted, no nerve damage noted and no underlying fracture noted      Contaminated: no    Treatment:     Area cleansed with:  Shur-Clens    Amount of cleaning:  Standard    Visualized foreign bodies/material removed: no      Debridement:  None    Scar revision: no    Skin repair:     Repair method:  Staples    Number of staples:  2  Approximation:     Approximation:  Close  Post-procedure details:     Procedure completion:  Tolerated    No results found for any visits on 11/04/22. CT HEAD WO CONTRAST    (Results Pending)   XR RIBS LEFT INCLUDE CHEST (MIN 3 VIEWS)    (Results Pending)                       Voice dictation software was used during the making of this note. This software is not perfect and grammatical and other typographical errors may be present. This note has not been completely proofread for errors.      Chucho Zacarias MD  11/04/22 [id number] Kevin Boston MD  11/04/22 [id number]

## 2022-11-04 NOTE — ED NOTES
I have reviewed discharge instructions with the patient. The patient verbalized understanding. Patient left ED via Discharge Method: wheelchair to Home with spouse  Opportunity for questions and clarification provided. Patient given 0 scripts. To continue your aftercare when you leave the hospital, you may receive an automated call from our care team to check in on how you are doing. This is a free service and part of our promise to provide the best care and service to meet your aftercare needs.  If you have questions, or wish to unsubscribe from this service please call [phone]. Thank you for Choosing our [facility] Emergency Department.        Bere Jerome RN  11/04/22 3138

## 2022-11-14 RX ORDER — TAMSULOSIN HYDROCHLORIDE 0.4 MG/1
0.4 CAPSULE ORAL DAILY
Qty: 30 CAPSULE | Refills: 0 | Status: SHIPPED | OUTPATIENT
Start: 2022-11-14

## 2022-11-17 ENCOUNTER — HOSPITAL ENCOUNTER (EMERGENCY)
Age: 77
Discharge: HOME | End: 2022-11-17
Attending: EMERGENCY MEDICINE

## 2022-11-17 VITALS
DIASTOLIC BLOOD PRESSURE: 83 MMHG | HEART RATE: 82 BPM | TEMPERATURE: 97.9 F | RESPIRATION RATE: 17 BRPM | SYSTOLIC BLOOD PRESSURE: 128 MMHG | OXYGEN SATURATION: 96 %

## 2022-11-17 DIAGNOSIS — Z48.02 ENCOUNTER FOR STAPLE REMOVAL: Primary | ICD-10-CM

## 2022-11-17 ASSESSMENT — PAIN - FUNCTIONAL ASSESSMENT: PAIN_FUNCTIONAL_ASSESSMENT: NONE - DENIES PAIN

## 2022-11-17 NOTE — ED NOTES
Pt states \"I still have that pain in the chest from the fall. You think it'll go away? \" pa and I informed patient we can do a chest pain work up to double check.  Pt denies and states \"everything was fine last week, i'm just sore still\"     Antonio Valderrama RN  11/17/22 2341

## 2022-11-17 NOTE — DISCHARGE INSTRUCTIONS
2 staples removed from your head today. The site of the wound looks like it is healing well. If you experience any fevers, chills, or pus looking drainage from the site of the wound return to the emergency department or follow-up with your primary care provider.

## 2022-11-17 NOTE — ED NOTES
I have reviewed discharge instructions with the patient. The patient verbalized understanding. Patient left ED via Discharge Method: ambulatory to Home with self    Opportunity for questions and clarification provided. Patient given 0 scripts. To continue your aftercare when you leave the hospital, you may receive an automated call from our care team to check in on how you are doing. This is a free service and part of our promise to provide the best care and service to meet your aftercare needs.  If you have questions, or wish to unsubscribe from this service please call [phone]. Thank you for Choosing our [address] [place] [address] Emergency Department.        Khari Sauceda RN  11/17/22 1499

## 2022-11-17 NOTE — ED PROVIDER NOTES
[facility] Emergency Department Provider Note                   PCP:                Janell Brady MD               Age: 76 y.o. Sex: male       ICD-10-CM    1. Encounter for staple removal  Z48.02           DISPOSITION Decision To Discharge 11/17/2022 12:44:10 PM         Orders Placed This Encounter   Procedures    SUTURE REMOVAL        Negin Downs is a 76 y.o. male who presents to the Emergency Department with chief complaint of  No chief complaint on file. 76year old male presenting for staple removal from his scalp. He had previously fallen and hit his head on the nightstand and 2 staples were placed over the site of the laceration. Today he is denying any fevers, chills, or purulent drainage from the site of the injury. The history is provided by the patient. Review of Systems   Skin:  Positive for wound. All other systems reviewed and are negative.     Past Medical History:   Diagnosis Date    CAD (coronary artery disease)     non-obstructive, Dr Izzy Bueno-  denies hx of mi, never had heart cath    Chronic airway obstruction, not elsewhere classified 07/11/2022    symbicort inh- very seldom, does not require supplemental oxygen- smokes 0.75- ppd since age 11    Essential hypertension, benign 07/01/2015    H/O echocardiogram 10/15/2021    LVEF 60-65%    History of colonic polyps     Inflammatory and toxic neuropathy, unspecified 07/01/2015    Kidney disease     Kidney stone     Neoplasm of uncertain behavior of skin     Neuralgia, neuritis, and radiculitis, unspecified     Neuropathy of both feet     Osteoarthritis     Other and unspecified hyperlipidemia 07/01/2015    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 Russ Tirado Left 7/12/2022    OPEN LEFT INGUINAL HERNIA REPAIR WITH MESH performed by Sean Jj MD at [address] [place] [address]          Family History   Problem Relation Age of Onset    Hypertension Father         Social History     Socioeconomic History    Marital status:      Spouse name: None    Number of children: None    Years of education: None    Highest education level: None   Tobacco Use    Smoking status: Every Day     Packs/day: 0.75     Years: 63.00     Pack years: 47.25     Types: Cigarettes     Start date: 1958    Smokeless tobacco: Never   Vaping Use    Vaping Use: Never used   Substance and Sexual Activity    Alcohol use: Not Currently     Alcohol/week: 0.0 standard drinks     Social Determinants of Health     Financial Resource Strain: Low Risk     Difficulty of Paying Living Expenses: Not hard at all   Food Insecurity: No Food Insecurity    Worried About [facility] in the Last Year: Never true    [facility] in the Last Year: Never true         Codeine     Discharge Medication List as of 11/17/2022 12:45 PM        CONTINUE these medications which have NOT CHANGED    Details   tamsulosin (FLOMAX) 0.4 MG capsule Take 1 capsule by mouth daily, Disp-30 capsule, R-0Normal      DULoxetine (CYMBALTA) 60 MG extended release capsule TAKE ONE CAPSULE BY MOUTH TWICE A DAY, Disp-180 capsule, R-3Normal      dilTIAZem (TIAZAC) 120 MG extended release capsule Take 1 capsule by mouth every morning, Disp-90 capsule, R-3Normal      docusate sodium (COLACE) 100 MG capsule Take 200 mg by mouth at bedtimeHistorical Med      budesonide-formoterol (SYMBICORT) 160-4.5 MCG/ACT AERO Inhale 2 puffs into the lungs as neededHistorical Med      Lancets MISC Starting Tue 2/22/2022, Historical MedPlease dispense lancets for patient to check FSBS four times a day      aspirin 81 MG EC tablet Take 81 mg by mouth at bedtime 7/11/22 - instructed to hold for surgery- last dose 7/10/22 at bedtimeHistorical Med      atorvastatin (LIPITOR) 20 MG tablet Take 20 mg by mouth at bedtime TAKE ONE TABLET BY MOUTH ONE TIME DAILYHistorical Med      diphenhydrAMINE (BENADRYL) 25 MG capsule Take 25 mg by mouth as needed Historical Med      gabapentin (NEURONTIN) 600 MG tablet Take 600 mg by mouth 3 times daily. TAKE ONE TABLET BY MOUTH THREE TIMES A DAYHistorical Med      glipiZIDE (GLUCOTROL XL) 5 MG extended release tablet Take 5 mg by mouth dailyHistorical Med      pioglitazone (ACTOS) 30 MG tablet Take 30 mg by mouth at bedtime Historical Med      SITagliptin (JANUVIA) 50 MG tablet TAKE ONE TABLET BY MOUTH ONE TIME DAILYHistorical Med              Vitals signs and nursing note reviewed. Patient Vitals for the past 4 hrs:   Temp Pulse Resp BP SpO2   11/17/22 1217 97.9 °F (36.6 °C) 82 17 128/83 96 %          Physical Exam  Vitals reviewed. Constitutional:       Appearance: Normal appearance. HENT:      Head: Normocephalic and atraumatic. Nose: No congestion or rhinorrhea. Mouth/Throat:      Mouth: Mucous membranes are moist.   Eyes:      Extraocular Movements: Extraocular movements intact. Conjunctiva/sclera: Conjunctivae normal.      Pupils: Pupils are equal, round, and reactive to light. Cardiovascular:      Rate and Rhythm: Normal rate and regular rhythm. Pulses: Normal pulses. Heart sounds: Normal heart sounds. No murmur heard. Pulmonary:      Effort: Pulmonary effort is normal. No respiratory distress. Breath sounds: Normal breath sounds. Abdominal:      General: Abdomen is flat. There is no distension. Palpations: Abdomen is soft. Tenderness:  There is no abdominal tenderness. There is no guarding. Genitourinary:     Penis: Normal.       Testes: Normal.   Musculoskeletal:         General: No swelling or tenderness. Normal range of motion. Cervical back: Normal range of motion and neck supple. No rigidity. Skin:     General: Skin is warm and dry. Comments: 2 staples present and removed from the right temporal aspect of the skull. Neurological:      General: No focal deficit present. Mental Status: He is alert and oriented to person, place, and time. Psychiatric:         Mood and Affect: Mood normal.         Behavior: Behavior normal.         Thought Content: Thought content normal.         Judgment: Judgment normal.        MDM  Number of Diagnoses or Management Options  Encounter for staple removal: new, needed workup  Diagnosis management comments: 75-year-old male presents to the emergency department for staple removal from wound site. Staple removal    Wound site appears to be healing well with edges of skin approximating and closing well. There is no evidence of surrounding erythema, purulence, or swelling. 2 staples were removed from the wound site and the patient tolerated the procedure well. Refer to procedural note for additional details. Patient was advised to return to the emergency department should he experience any fevers, chills, redness, swelling, or purulence from the wound site.     Patient Progress  Patient progress: stable      Suture Removal    Date/Time: 11/17/2022 12:21 PM  Performed by: AUDREY Masters  Authorized by: John Esposito MD     Consent:     Consent obtained:  Verbal    Consent given by:  Patient    Risks, benefits, and alternatives were discussed: yes      Risks discussed:  Pain, wound separation and bleeding  Location:     Location:  Head/neck    Head/neck location:  Scalp  Procedure details:     Wound appearance:  No signs of infection, nonpurulent, nontender, good wound healing and clean    Number of staples removed:  2  Post-procedure details:     Post-removal:  No dressing applied    Procedure completion:  Tolerated      Labs Reviewed - No data to display     No orders to display                          Voice dictation software was used during the making of this note. This software is not perfect and grammatical and other typographical errors may be present. This note has not been completely proofread for errors.      AUDREY Fisher  11/17/22 [address]  11/17/22 9810

## 2022-12-06 RX ORDER — TAMSULOSIN HYDROCHLORIDE 0.4 MG/1
0.4 CAPSULE ORAL DAILY
Qty: 90 CAPSULE | Refills: 1 | Status: SHIPPED | OUTPATIENT
Start: 2022-12-06

## 2022-12-06 RX ORDER — DULOXETIN HYDROCHLORIDE 60 MG/1
CAPSULE, DELAYED RELEASE ORAL
Qty: 180 CAPSULE | Refills: 3 | OUTPATIENT
Start: 2022-12-06

## 2022-12-06 RX ORDER — TAMSULOSIN HYDROCHLORIDE 0.4 MG/1
CAPSULE ORAL
Qty: 30 CAPSULE | Refills: 0 | OUTPATIENT
Start: 2022-12-06

## 2022-12-12 ENCOUNTER — NURSE ONLY (OUTPATIENT)
Dept: FAMILY MEDICINE CLINIC | Facility: CLINIC | Age: 77
End: 2022-12-12

## 2022-12-12 DIAGNOSIS — N18.30 STAGE 3 CHRONIC KIDNEY DISEASE, UNSPECIFIED WHETHER STAGE 3A OR 3B CKD (HCC): ICD-10-CM

## 2022-12-12 DIAGNOSIS — E78.00 PURE HYPERCHOLESTEROLEMIA: ICD-10-CM

## 2022-12-12 DIAGNOSIS — E11.42 TYPE 2 DIABETES MELLITUS WITH DIABETIC POLYNEUROPATHY, WITHOUT LONG-TERM CURRENT USE OF INSULIN (HCC): ICD-10-CM

## 2022-12-12 DIAGNOSIS — E78.1 PURE HYPERGLYCERIDEMIA: ICD-10-CM

## 2022-12-12 DIAGNOSIS — I10 PRIMARY HYPERTENSION: ICD-10-CM

## 2022-12-12 LAB
ALBUMIN SERPL-MCNC: 3.6 G/DL (ref 3.2–4.6)
ALBUMIN/GLOB SERPL: 1.1 {RATIO} (ref 0.4–1.6)
ALP SERPL-CCNC: 124 U/L (ref 50–136)
ALT SERPL-CCNC: 22 U/L (ref 12–65)
ANION GAP SERPL CALC-SCNC: 5 MMOL/L (ref 2–11)
AST SERPL-CCNC: 26 U/L (ref 15–37)
BILIRUB SERPL-MCNC: 0.5 MG/DL (ref 0.2–1.1)
BUN SERPL-MCNC: 41 MG/DL (ref 8–23)
CALCIUM SERPL-MCNC: 8.9 MG/DL (ref 8.3–10.4)
CHLORIDE SERPL-SCNC: 110 MMOL/L (ref 101–110)
CHOLEST SERPL-MCNC: 114 MG/DL
CO2 SERPL-SCNC: 26 MMOL/L (ref 21–32)
CREAT SERPL-MCNC: 1.9 MG/DL (ref 0.8–1.5)
GLOBULIN SER CALC-MCNC: 3.2 G/DL (ref 2.8–4.5)
GLUCOSE SERPL-MCNC: 114 MG/DL (ref 65–100)
HDLC SERPL-MCNC: 45 MG/DL (ref 40–60)
HDLC SERPL: 2.5 {RATIO}
LDLC SERPL CALC-MCNC: 48.6 MG/DL
POTASSIUM SERPL-SCNC: 5 MMOL/L (ref 3.5–5.1)
PROT SERPL-MCNC: 6.8 G/DL (ref 6.3–8.2)
SODIUM SERPL-SCNC: 141 MMOL/L (ref 133–143)
TRIGL SERPL-MCNC: 102 MG/DL (ref 35–150)
VLDLC SERPL CALC-MCNC: 20.4 MG/DL (ref 6–23)

## 2022-12-14 LAB
EST. AVERAGE GLUCOSE BLD GHB EST-MCNC: 128 MG/DL
HBA1C MFR BLD: 6.1 % (ref 4.8–5.6)

## 2022-12-19 ENCOUNTER — OFFICE VISIT (OUTPATIENT)
Dept: FAMILY MEDICINE CLINIC | Facility: CLINIC | Age: 77
End: 2022-12-19
Payer: MEDICARE

## 2022-12-19 VITALS
WEIGHT: 220.8 LBS | BODY MASS INDEX: 30.91 KG/M2 | SYSTOLIC BLOOD PRESSURE: 120 MMHG | HEIGHT: 71 IN | DIASTOLIC BLOOD PRESSURE: 64 MMHG

## 2022-12-19 DIAGNOSIS — G47.00 INSOMNIA, UNSPECIFIED TYPE: ICD-10-CM

## 2022-12-19 DIAGNOSIS — R33.9 URINARY RETENTION: ICD-10-CM

## 2022-12-19 DIAGNOSIS — E11.42 TYPE 2 DIABETES MELLITUS WITH DIABETIC POLYNEUROPATHY, WITHOUT LONG-TERM CURRENT USE OF INSULIN (HCC): ICD-10-CM

## 2022-12-19 DIAGNOSIS — E78.1 PURE HYPERGLYCERIDEMIA: ICD-10-CM

## 2022-12-19 DIAGNOSIS — I10 PRIMARY HYPERTENSION: Primary | ICD-10-CM

## 2022-12-19 DIAGNOSIS — N18.30 STAGE 3 CHRONIC KIDNEY DISEASE, UNSPECIFIED WHETHER STAGE 3A OR 3B CKD (HCC): ICD-10-CM

## 2022-12-19 DIAGNOSIS — D69.2 SENILE PURPURA (HCC): ICD-10-CM

## 2022-12-19 DIAGNOSIS — E78.00 PURE HYPERCHOLESTEROLEMIA: ICD-10-CM

## 2022-12-19 PROCEDURE — G8427 DOCREV CUR MEDS BY ELIG CLIN: HCPCS | Performed by: FAMILY MEDICINE

## 2022-12-19 PROCEDURE — 1123F ACP DISCUSS/DSCN MKR DOCD: CPT | Performed by: FAMILY MEDICINE

## 2022-12-19 PROCEDURE — 3074F SYST BP LT 130 MM HG: CPT | Performed by: FAMILY MEDICINE

## 2022-12-19 PROCEDURE — 3044F HG A1C LEVEL LT 7.0%: CPT | Performed by: FAMILY MEDICINE

## 2022-12-19 PROCEDURE — 4004F PT TOBACCO SCREEN RCVD TLK: CPT | Performed by: FAMILY MEDICINE

## 2022-12-19 PROCEDURE — G8484 FLU IMMUNIZE NO ADMIN: HCPCS | Performed by: FAMILY MEDICINE

## 2022-12-19 PROCEDURE — 3078F DIAST BP <80 MM HG: CPT | Performed by: FAMILY MEDICINE

## 2022-12-19 PROCEDURE — G8417 CALC BMI ABV UP PARAM F/U: HCPCS | Performed by: FAMILY MEDICINE

## 2022-12-19 PROCEDURE — 99214 OFFICE O/P EST MOD 30 MIN: CPT | Performed by: FAMILY MEDICINE

## 2022-12-19 RX ORDER — BUDESONIDE AND FORMOTEROL FUMARATE DIHYDRATE 160; 4.5 UG/1; UG/1
2 AEROSOL RESPIRATORY (INHALATION) AS NEEDED
Qty: 3 G | Refills: 3 | Status: SHIPPED | OUTPATIENT
Start: 2022-12-19

## 2022-12-19 RX ORDER — DULOXETIN HYDROCHLORIDE 60 MG/1
CAPSULE, DELAYED RELEASE ORAL
Qty: 180 CAPSULE | Refills: 3 | OUTPATIENT
Start: 2022-12-19

## 2022-12-19 NOTE — PROGRESS NOTES
SUBJECTIVE:   Yara King is a 76 y.o. male  who has a past medical history significant for hypertension, high cholesterol, high triglycerides, diabetes with polyneuropathy, chronic kidney disease followed by nephrology, CAD followed by cardiology and COPD. Review of systems reveals that since I last seen him had a left inguinal hernia repair. He had postoperative urinary retention requiring an emergency room visit and Matos catheter placement. He followed up with urology and had this removed without complication or recurrent urinary retention. In addition since I last seen him he fell out of bed and sustained a scalp laceration. This was repaired with staples which have been removed. He reports no loss of consciousness are concussive type symptoms. Patient reports ongoing struggles with insomnia with both difficulty initiating and maintaining sleep. This is a longstanding problem. He states that he takes Benadryl for this. In addition he reports easy bruisability and wants to know if there is \"something he can do about that\". No bleeding tendencies are reported. He does take a baby aspirin daily he states. HPI  See above    Past Medical History, Past Surgical History, Family history, Social History, and Medications were all reviewed with the patient today and updated as necessary.        Current Outpatient Medications   Medication Sig Dispense Refill    budesonide-formoterol (SYMBICORT) 160-4.5 MCG/ACT AERO Inhale 2 puffs into the lungs as needed (wheezing) 3 g 3    tamsulosin (FLOMAX) 0.4 MG capsule Take 1 capsule by mouth daily 90 capsule 1    DULoxetine (CYMBALTA) 60 MG extended release capsule TAKE ONE CAPSULE BY MOUTH TWICE A  capsule 3    dilTIAZem (TIAZAC) 120 MG extended release capsule Take 1 capsule by mouth every morning 90 capsule 3    docusate sodium (COLACE) 100 MG capsule Take 200 mg by mouth at bedtime      Lancets MISC Please dispense lancets for patient to check FSBS four times a day      aspirin 81 MG EC tablet Take 81 mg by mouth at bedtime 7/11/22 - instructed to hold for surgery- last dose 7/10/22 at bedtime      atorvastatin (LIPITOR) 20 MG tablet Take 20 mg by mouth at bedtime TAKE ONE TABLET BY MOUTH ONE TIME DAILY      diphenhydrAMINE (BENADRYL) 25 MG capsule Take 25 mg by mouth as needed       gabapentin (NEURONTIN) 600 MG tablet Take 600 mg by mouth 3 times daily. TAKE ONE TABLET BY MOUTH THREE TIMES A DAY      glipiZIDE (GLUCOTROL XL) 5 MG extended release tablet Take 5 mg by mouth daily      pioglitazone (ACTOS) 30 MG tablet Take 30 mg by mouth at bedtime       SITagliptin (JANUVIA) 50 MG tablet TAKE ONE TABLET BY MOUTH ONE TIME DAILY       No current facility-administered medications for this visit.      Allergies   Allergen Reactions    Codeine Nausea And Vomiting     Patient Active Problem List   Diagnosis    Type 2 diabetes mellitus with diabetic polyneuropathy, without long-term current use of insulin ([facility])    Coronary artery disease involving native coronary artery of native heart without angina pectoris    Elevated alkaline phosphatase level    Radiologic findings of lung field, abnormal    COPD (chronic obstructive pulmonary disease) ([facility])    CKD (chronic kidney disease) stage 3, GFR 30-59 ml/min ([facility])    Tobacco abuse    Syncope and collapse    Hypoglycemia    Sinus tachycardia    Basal cell carcinoma of scalp    Incarcerated left inguinal hernia    Essential hypertension, benign    Syncope    Hiatal hernia    Dyslipidemia    Renal cyst, left    Uncontrolled type 2 diabetes mellitus with hyperglycemia ([facility])    Bilateral kidney stones     Past Medical History:   Diagnosis Date    CAD (coronary artery disease)     non-obstructive, Dr James Patterson-  denies hx of mi, never had heart cath    Chronic airway obstruction, not elsewhere classified 07/11/2022    symbicort inh- very seldom, does not require supplemental oxygen- smokes 0.75- ppd since age 11    Essential hypertension, benign 07/01/2015    H/O echocardiogram 10/15/2021    LVEF 60-65%    History of colonic polyps     Inflammatory and toxic neuropathy, unspecified 07/01/2015    Kidney disease     Kidney stone     Neoplasm of uncertain behavior of skin     Neuralgia, neuritis, and radiculitis, unspecified     Neuropathy of both feet     Osteoarthritis     Other and unspecified hyperlipidemia 07/01/2015    Paralysis of diaphragm 2002    Left side paralysis of diaphram    PONV (postoperative nausea and vomiting)     Prolonged emergence from general anesthesia 2007    14 hrs in recovery, hypotensive  (R shoulder arthroplasty), hx Left side paralysis of diaphram     Type II or unspecified type diabetes mellitus without mention of complication, uncontrolled 07/01/2015    PO meds- fbs 108- A1C 6.3 (6/2022) - recognizes hypo s/s but has no checked when it happens \"I just eat something\"    Ulcer of ankle ([facility] [address] 07/01/2015     Past Surgical History:   Procedure Laterality Date    COLONOSCOPY  2007    w/ Dr. Shepherd Comfort Left 7/12/2022    OPEN LEFT INGUINAL HERNIA REPAIR WITH MESH performed by Marcos Toussaint MD at [address] [place] [address]       Family History   Problem Relation Age of Onset    Hypertension Father      Social History     Tobacco Use    Smoking status: Every Day     Packs/day: 0.75     Years: 63.00     Pack years: 47.25     Types: Cigarettes     Start date: 1958    Smokeless tobacco: Never   Substance Use Topics    Alcohol use: Not Currently     Alcohol/week: 0.0 standard drinks         Review of Systems  See above    OBJECTIVE:  /64   Ht 5' 11\" (1.803 m)   Wt 220 lb 12.8 oz (100.2 kg)   BMI 30.80 kg/m²      Physical Exam  Constitutional:       General: He is not in acute distress. Appearance: Normal appearance. He is not ill-appearing. HENT:      Head: Normocephalic and atraumatic. Cardiovascular:      Rate and Rhythm: Normal rate and regular rhythm.       Heart sounds: Normal heart sounds. No murmur heard. Pulmonary:      Effort: Pulmonary effort is normal.      Breath sounds: Normal breath sounds. No wheezing or rhonchi. Musculoskeletal:         General: Normal range of motion. Cervical back: Normal range of motion and neck supple. Right lower leg: No edema. Left lower leg: No edema. Skin:     General: Skin is warm. Findings: No rash. Comments: Senile purpura is noted on the patient's hands and forearms bilaterally. Neurological:      Mental Status: He is alert and oriented to person, place, and time. Psychiatric:         Mood and Affect: Mood normal.         Behavior: Behavior normal.         Thought Content: Thought content normal.         Judgment: Judgment normal.       Medical problems and test results were reviewed with the patient today. ASSESSMENT and PLAN    1. Hypertension. /64. Continue current therapy. 2.  Diabetes with polyneuropathy. A1c 6.1. Previously 6.3. Continue current therapy. If remaining this good we may start backing off of some of his diabetic meds. Encourage yearly retinal exams and daily feet exams. 3.  High cholesterol. LDL 48. Liver enzymes remain normal.  Continue statin. 4.  High triglycerides. Triglycerides are 102. Continue dietary focus. 5.  Chronic kidney disease. Per nephrology. Creatinine stable at 1.9.    6.  Postoperative urinary retention. No residual complaints. Continue Flomax. Continue follow-up with urology as needed. 7.  Senile purpura. Reassurance provided to the patient. I do not see any specific reason why he needs to take his baby aspirin. He may discontinue this if he wants as this may contribute to improvement of his senile purpura if he discontinues it. 8.  Insomnia. Discontinue Benadryl for fall risk concerns. Trial of melatonin with L-theanine and CBD Gummies. Elements of this note have been dictated using speech recognition software.  As a result, errors of speech recognition may have occurred.

## 2022-12-21 RX ORDER — DULOXETIN HYDROCHLORIDE 60 MG/1
CAPSULE, DELAYED RELEASE ORAL
Qty: 180 CAPSULE | Refills: 3 | Status: SHIPPED | OUTPATIENT
Start: 2022-12-21

## 2023-01-11 RX ORDER — GABAPENTIN 600 MG/1
TABLET ORAL
Qty: 270 TABLET | Refills: 3 | Status: SHIPPED | OUTPATIENT
Start: 2023-01-11 | End: 2023-04-11

## 2023-01-11 RX ORDER — SITAGLIPTIN 50 MG/1
TABLET, FILM COATED ORAL
Qty: 90 TABLET | Refills: 3 | Status: SHIPPED | OUTPATIENT
Start: 2023-01-11

## 2023-01-16 RX ORDER — GABAPENTIN 600 MG/1
TABLET ORAL
Qty: 270 TABLET | Refills: 3 | OUTPATIENT
Start: 2023-01-16 | End: 2023-04-16

## 2023-01-27 ENCOUNTER — OFFICE VISIT (OUTPATIENT)
Dept: FAMILY MEDICINE CLINIC | Facility: CLINIC | Age: 78
End: 2023-01-27
Payer: MEDICARE

## 2023-01-27 VITALS
HEIGHT: 71 IN | DIASTOLIC BLOOD PRESSURE: 76 MMHG | WEIGHT: 224.8 LBS | BODY MASS INDEX: 31.47 KG/M2 | SYSTOLIC BLOOD PRESSURE: 120 MMHG

## 2023-01-27 DIAGNOSIS — I25.118 ATHEROSCLEROTIC HEART DISEASE OF NATIVE CORONARY ARTERY WITH OTHER FORMS OF ANGINA PECTORIS (HCC): ICD-10-CM

## 2023-01-27 DIAGNOSIS — I10 PRIMARY HYPERTENSION: ICD-10-CM

## 2023-01-27 DIAGNOSIS — M79.671 RIGHT FOOT PAIN: Primary | ICD-10-CM

## 2023-01-27 DIAGNOSIS — N18.30 STAGE 3 CHRONIC KIDNEY DISEASE, UNSPECIFIED WHETHER STAGE 3A OR 3B CKD (HCC): ICD-10-CM

## 2023-01-27 DIAGNOSIS — E11.42 TYPE 2 DIABETES MELLITUS WITH DIABETIC POLYNEUROPATHY, WITHOUT LONG-TERM CURRENT USE OF INSULIN (HCC): ICD-10-CM

## 2023-01-27 PROCEDURE — 1123F ACP DISCUSS/DSCN MKR DOCD: CPT | Performed by: FAMILY MEDICINE

## 2023-01-27 PROCEDURE — G8484 FLU IMMUNIZE NO ADMIN: HCPCS | Performed by: FAMILY MEDICINE

## 2023-01-27 PROCEDURE — G8427 DOCREV CUR MEDS BY ELIG CLIN: HCPCS | Performed by: FAMILY MEDICINE

## 2023-01-27 PROCEDURE — 4004F PT TOBACCO SCREEN RCVD TLK: CPT | Performed by: FAMILY MEDICINE

## 2023-01-27 PROCEDURE — 3074F SYST BP LT 130 MM HG: CPT | Performed by: FAMILY MEDICINE

## 2023-01-27 PROCEDURE — 3078F DIAST BP <80 MM HG: CPT | Performed by: FAMILY MEDICINE

## 2023-01-27 PROCEDURE — G8417 CALC BMI ABV UP PARAM F/U: HCPCS | Performed by: FAMILY MEDICINE

## 2023-01-27 PROCEDURE — 99213 OFFICE O/P EST LOW 20 MIN: CPT | Performed by: FAMILY MEDICINE

## 2023-01-27 ASSESSMENT — PATIENT HEALTH QUESTIONNAIRE - PHQ9
SUM OF ALL RESPONSES TO PHQ QUESTIONS 1-9: 0
2. FEELING DOWN, DEPRESSED OR HOPELESS: 0
1. LITTLE INTEREST OR PLEASURE IN DOING THINGS: 0
SUM OF ALL RESPONSES TO PHQ QUESTIONS 1-9: 0
SUM OF ALL RESPONSES TO PHQ9 QUESTIONS 1 & 2: 0

## 2023-01-27 NOTE — PROGRESS NOTES
SUBJECTIVE:   Nicolasa Garcia is a 76 y.o. male who has a past medical history significant for hypertension, high cholesterol, high triglycerides, diabetes with polyneuropathy, chronic kidney disease followed by nephrology, CAD followed by cardiology and COPD. Patient presents today reporting that he has been experiencing a 3-day history of right foot pain. He reports thaddeus his foot was sore when he woke up on Tuesday. Patient reports it was soreness in his heel and the back of the foot with Achilles tendon attaches. No known trauma. No swelling. He reports that when he walks or bends his foot it hurts in this area. No recent quinolone antibiotic use. No warmth or redness reported. Has not taken anything for this discomfort. He does report a distant history of gout but does not think it was in this area of problem. HPI  See above    Past Medical History, Past Surgical History, Family history, Social History, and Medications were all reviewed with the patient today and updated as necessary.        Current Outpatient Medications   Medication Sig Dispense Refill    gabapentin (NEURONTIN) 600 MG tablet TAKE ONE TABLET BY MOUTH THREE TIMES A  tablet 3    JANUVIA 50 MG tablet TAKE ONE TABLET BY MOUTH ONE TIME DAILY 90 tablet 3    DULoxetine (CYMBALTA) 60 MG extended release capsule TAKE ONE CAPSULE BY MOUTH TWICE A  capsule 3    budesonide-formoterol (SYMBICORT) 160-4.5 MCG/ACT AERO Inhale 2 puffs into the lungs as needed (wheezing) 3 g 3    tamsulosin (FLOMAX) 0.4 MG capsule Take 1 capsule by mouth daily 90 capsule 1    dilTIAZem (TIAZAC) 120 MG extended release capsule Take 1 capsule by mouth every morning 90 capsule 3    docusate sodium (COLACE) 100 MG capsule Take 200 mg by mouth at bedtime      Lancets MISC Please dispense lancets for patient to check FSBS four times a day      atorvastatin (LIPITOR) 20 MG tablet Take 20 mg by mouth at bedtime TAKE ONE TABLET BY MOUTH ONE TIME DAILY diphenhydrAMINE (BENADRYL) 25 MG capsule Take 25 mg by mouth as needed       glipiZIDE (GLUCOTROL XL) 5 MG extended release tablet Take 5 mg by mouth daily      pioglitazone (ACTOS) 30 MG tablet Take 30 mg by mouth at bedtime        No current facility-administered medications for this visit.      Allergies   Allergen Reactions    Codeine Nausea And Vomiting     Patient Active Problem List   Diagnosis    Type 2 diabetes mellitus with diabetic polyneuropathy, without long-term current use of insulin ([facility])    Coronary artery disease involving native coronary artery of native heart without angina pectoris    Elevated alkaline phosphatase level    Radiologic findings of lung field, abnormal    COPD (chronic obstructive pulmonary disease) ([facility])    CKD (chronic kidney disease) stage 3, GFR 30-59 ml/min ([facility])    Tobacco abuse    Syncope and collapse    Hypoglycemia    Sinus tachycardia    Basal cell carcinoma of scalp    Incarcerated left inguinal hernia    Essential hypertension, benign    Syncope    Hiatal hernia    Dyslipidemia    Renal cyst, left    Uncontrolled type 2 diabetes mellitus with hyperglycemia ([facility])    Bilateral kidney stones     Past Medical History:   Diagnosis Date    CAD (coronary artery disease)     non-obstructive, Dr Kathleen Ga-  denies hx of mi, never had heart cath    Chronic airway obstruction, not elsewhere classified 07/11/2022    symbicort inh- very seldom, does not require supplemental oxygen- smokes 0.75- ppd since age 11    Essential hypertension, benign 07/01/2015    H/O echocardiogram 10/15/2021    LVEF 60-65%    History of colonic polyps     Inflammatory and toxic neuropathy, unspecified 07/01/2015    Kidney disease     Kidney stone     Neoplasm of uncertain behavior of skin     Neuralgia, neuritis, and radiculitis, unspecified     Neuropathy of both feet     Osteoarthritis     Other and unspecified hyperlipidemia 07/01/2015    Paralysis of diaphragm 2002    Left side paralysis of diaphram PONV (postoperative nausea and vomiting)     Prolonged emergence from general anesthesia 2007    14 hrs in recovery, hypotensive  (R shoulder arthroplasty), hx Left side paralysis of diaphram     Type II or unspecified type diabetes mellitus without mention of complication, uncontrolled 07/01/2015    PO meds- fbs 108- A1C 6.3 (6/2022) - recognizes hypo s/s but has no checked when it happens \"I just eat something\"    Ulcer of ankle ([address] 07/01/2015     Past Surgical History:   Procedure Laterality Date    COLONOSCOPY  2007    w/ Dr. Destinee Yañez Left 7/12/2022    OPEN LEFT INGUINAL HERNIA REPAIR WITH MESH performed by Clare Edouard MD at [facility]       Family History   Problem Relation Age of Onset    Hypertension Father      Social History     Tobacco Use    Smoking status: Every Day     Packs/day: 0.75     Years: 63.00     Pack years: 47.25     Types: Cigarettes     Start date: 1958    Smokeless tobacco: Never   Substance Use Topics    Alcohol use: Not Currently     Alcohol/week: 0.0 standard drinks         Review of Systems  See above    OBJECTIVE:  /76   Ht 5' 11\" (1.803 m)   Wt 224 lb 12.8 oz (102 kg)   BMI 31.35 kg/m²      Physical Exam  Constitutional:       General: He is not in acute distress. Appearance: Normal appearance. He is not ill-appearing. HENT:      Head: Normocephalic and atraumatic. Cardiovascular:      Rate and Rhythm: Normal rate and regular rhythm. Heart sounds: Normal heart sounds. No murmur heard. Pulmonary:      Effort: Pulmonary effort is normal.      Breath sounds: Normal breath sounds. No wheezing or rhonchi. Musculoskeletal:         General: Normal range of motion. Cervical back: Normal range of motion and neck supple. Right lower leg: No edema. Left lower leg: No edema. Comments: Examination of the patient's right foot reveals no gross deformity or swelling.   No discomfort over the metatarsals with attention to the first metatarsophalangeal joint. There is tenderness along the distal Achilles tendon. Negative Trejo's test.  No limited range of motion with flexion and extension of the foot and ankle. Skin:     General: Skin is warm. Findings: No rash. Neurological:      Mental Status: He is alert and oriented to person, place, and time. Psychiatric:         Mood and Affect: Mood normal.         Behavior: Behavior normal.         Thought Content: Thought content normal.         Judgment: Judgment normal.       Medical problems and test results were reviewed with the patient today. ASSESSMENT and PLAN    1. Right foot pain. Appears to be Achilles tendinitis. We will check uric acid level for completeness sake. Discussed icing this area down 3 times a day for 10 minutes followed by small application of Voltaren gel. His most recent creatinine was 1.9. He is to avoid oral nonsteroidals but the topical Voltaren gel will be used sparingly for no more than 3 to 5 days. May use some Tylenol if needed. Also instructed to gently wrap his foot and elevate it and rested this weekend. If symptoms persist consider further work-up. 2.  Hypertension. /76. 3.  Diabetes. Most recent A1c 6.1.    4.  Chronic kidney disease. Continue follow-up with nephrology. Most recent creatinine 1.9.    5. CAD. No chest pain reported. Elements of this note have been dictated using speech recognition software. As a result, errors of speech recognition may have occurred.

## 2023-01-28 LAB — URATE SERPL-MCNC: 6.6 MG/DL (ref 2.6–6)

## 2023-02-03 RX ORDER — GLIPIZIDE 5 MG/1
5 TABLET, FILM COATED, EXTENDED RELEASE ORAL DAILY
Qty: 90 TABLET | Refills: 3 | Status: SHIPPED | OUTPATIENT
Start: 2023-02-03

## 2023-02-06 RX ORDER — PIOGLITAZONEHYDROCHLORIDE 30 MG/1
TABLET ORAL
Qty: 90 TABLET | Refills: 3 | OUTPATIENT
Start: 2023-02-06

## 2023-02-06 RX ORDER — PIOGLITAZONEHYDROCHLORIDE 30 MG/1
30 TABLET ORAL NIGHTLY
Qty: 90 TABLET | Refills: 3 | Status: SHIPPED | OUTPATIENT
Start: 2023-02-06

## 2023-02-06 RX ORDER — ATORVASTATIN CALCIUM 20 MG/1
TABLET, FILM COATED ORAL
Qty: 90 TABLET | Refills: 3 | OUTPATIENT
Start: 2023-02-06

## 2023-02-06 RX ORDER — ATORVASTATIN CALCIUM 20 MG/1
TABLET, FILM COATED ORAL
Qty: 90 TABLET | Refills: 3 | Status: SHIPPED | OUTPATIENT
Start: 2023-02-06

## 2023-02-13 RX ORDER — ATORVASTATIN CALCIUM 20 MG/1
TABLET, FILM COATED ORAL
Qty: 90 TABLET | Refills: 3 | OUTPATIENT
Start: 2023-02-13

## 2023-03-23 ENCOUNTER — OFFICE VISIT (OUTPATIENT)
Dept: FAMILY MEDICINE CLINIC | Facility: CLINIC | Age: 78
End: 2023-03-23

## 2023-03-23 VITALS
BODY MASS INDEX: 31.58 KG/M2 | SYSTOLIC BLOOD PRESSURE: 126 MMHG | OXYGEN SATURATION: 96 % | WEIGHT: 225.6 LBS | HEIGHT: 71 IN | HEART RATE: 91 BPM | DIASTOLIC BLOOD PRESSURE: 74 MMHG

## 2023-03-23 DIAGNOSIS — N64.4 NIPPLE SORENESS: ICD-10-CM

## 2023-03-23 DIAGNOSIS — J44.9 CHRONIC OBSTRUCTIVE PULMONARY DISEASE, UNSPECIFIED COPD TYPE (HCC): ICD-10-CM

## 2023-03-23 DIAGNOSIS — E11.42 TYPE 2 DIABETES MELLITUS WITH DIABETIC POLYNEUROPATHY, WITHOUT LONG-TERM CURRENT USE OF INSULIN (HCC): ICD-10-CM

## 2023-03-23 DIAGNOSIS — E78.1 PURE HYPERGLYCERIDEMIA: ICD-10-CM

## 2023-03-23 DIAGNOSIS — Z00.00 MEDICARE ANNUAL WELLNESS VISIT, SUBSEQUENT: Primary | ICD-10-CM

## 2023-03-23 DIAGNOSIS — M79.671 RIGHT FOOT PAIN: ICD-10-CM

## 2023-03-23 DIAGNOSIS — E78.00 PURE HYPERCHOLESTEROLEMIA: ICD-10-CM

## 2023-03-23 DIAGNOSIS — N18.30 STAGE 3 CHRONIC KIDNEY DISEASE, UNSPECIFIED WHETHER STAGE 3A OR 3B CKD (HCC): ICD-10-CM

## 2023-03-23 DIAGNOSIS — I25.10 CORONARY ARTERY DISEASE INVOLVING NATIVE CORONARY ARTERY OF NATIVE HEART WITHOUT ANGINA PECTORIS: ICD-10-CM

## 2023-03-23 DIAGNOSIS — I10 PRIMARY HYPERTENSION: ICD-10-CM

## 2023-03-23 LAB
ALBUMIN SERPL-MCNC: 3.5 G/DL (ref 3.2–4.6)
ALBUMIN/GLOB SERPL: 1.1 (ref 0.4–1.6)
ALP SERPL-CCNC: 124 U/L (ref 50–136)
ALT SERPL-CCNC: 18 U/L (ref 12–65)
ANION GAP SERPL CALC-SCNC: 3 MMOL/L (ref 2–11)
AST SERPL-CCNC: 15 U/L (ref 15–37)
BASOPHILS # BLD: 0 K/UL (ref 0–0.2)
BASOPHILS NFR BLD: 0 % (ref 0–2)
BILIRUB SERPL-MCNC: 0.3 MG/DL (ref 0.2–1.1)
BUN SERPL-MCNC: 39 MG/DL (ref 8–23)
CALCIUM SERPL-MCNC: 8.8 MG/DL (ref 8.3–10.4)
CHLORIDE SERPL-SCNC: 109 MMOL/L (ref 101–110)
CHOLEST SERPL-MCNC: 112 MG/DL
CO2 SERPL-SCNC: 29 MMOL/L (ref 21–32)
CREAT SERPL-MCNC: 2 MG/DL (ref 0.8–1.5)
DIFFERENTIAL METHOD BLD: ABNORMAL
EOSINOPHIL # BLD: 0.3 K/UL (ref 0–0.8)
EOSINOPHIL NFR BLD: 4 % (ref 0.5–7.8)
ERYTHROCYTE [DISTWIDTH] IN BLOOD BY AUTOMATED COUNT: 13.6 % (ref 11.9–14.6)
EST. AVERAGE GLUCOSE BLD GHB EST-MCNC: 134 MG/DL
GLOBULIN SER CALC-MCNC: 3.1 G/DL (ref 2.8–4.5)
GLUCOSE SERPL-MCNC: 116 MG/DL (ref 65–100)
HBA1C MFR BLD: 6.3 % (ref 4.8–5.6)
HCT VFR BLD AUTO: 46.5 % (ref 41.1–50.3)
HDLC SERPL-MCNC: 52 MG/DL (ref 40–60)
HDLC SERPL: 2.2
HGB BLD-MCNC: 14.4 G/DL (ref 13.6–17.2)
IMM GRANULOCYTES # BLD AUTO: 0 K/UL (ref 0–0.5)
IMM GRANULOCYTES NFR BLD AUTO: 0 % (ref 0–5)
LDLC SERPL CALC-MCNC: 37 MG/DL
LYMPHOCYTES # BLD: 2.1 K/UL (ref 0.5–4.6)
LYMPHOCYTES NFR BLD: 33 % (ref 13–44)
MCH RBC QN AUTO: 32.1 PG (ref 26.1–32.9)
MCHC RBC AUTO-ENTMCNC: 31 G/DL (ref 31.4–35)
MCV RBC AUTO: 103.8 FL (ref 82–102)
MONOCYTES # BLD: 0.7 K/UL (ref 0.1–1.3)
MONOCYTES NFR BLD: 12 % (ref 4–12)
NEUTS SEG # BLD: 3.2 K/UL (ref 1.7–8.2)
NEUTS SEG NFR BLD: 51 % (ref 43–78)
NRBC # BLD: 0 K/UL (ref 0–0.2)
PLATELET # BLD AUTO: 203 K/UL (ref 150–450)
PMV BLD AUTO: 11.1 FL (ref 9.4–12.3)
POTASSIUM SERPL-SCNC: 4.5 MMOL/L (ref 3.5–5.1)
PROT SERPL-MCNC: 6.6 G/DL (ref 6.3–8.2)
RBC # BLD AUTO: 4.48 M/UL (ref 4.23–5.6)
SODIUM SERPL-SCNC: 141 MMOL/L (ref 133–143)
TRIGL SERPL-MCNC: 115 MG/DL (ref 35–150)
TSH, 3RD GENERATION: 1.4 UIU/ML (ref 0.36–3.74)
VLDLC SERPL CALC-MCNC: 23 MG/DL (ref 6–23)
WBC # BLD AUTO: 6.3 K/UL (ref 4.3–11.1)

## 2023-03-23 ASSESSMENT — PATIENT HEALTH QUESTIONNAIRE - PHQ9
SUM OF ALL RESPONSES TO PHQ QUESTIONS 1-9: 0
SUM OF ALL RESPONSES TO PHQ9 QUESTIONS 1 & 2: 0
SUM OF ALL RESPONSES TO PHQ QUESTIONS 1-9: 0
2. FEELING DOWN, DEPRESSED OR HOPELESS: 0
SUM OF ALL RESPONSES TO PHQ QUESTIONS 1-9: 0
1. LITTLE INTEREST OR PLEASURE IN DOING THINGS: 0
SUM OF ALL RESPONSES TO PHQ QUESTIONS 1-9: 0

## 2023-03-23 ASSESSMENT — LIFESTYLE VARIABLES
HOW MANY STANDARD DRINKS CONTAINING ALCOHOL DO YOU HAVE ON A TYPICAL DAY: 1 OR 2
HOW OFTEN DO YOU HAVE A DRINK CONTAINING ALCOHOL: MONTHLY OR LESS

## 2023-03-23 NOTE — PROGRESS NOTES
Medicare Annual Wellness Visit    Scotty Tsai is here for Medicare AWV (Subsequent)    Assessment & Plan   Medicare annual wellness visit, subsequent      Recommendations for Preventive Services Due: see orders and patient instructions/AVS.  Recommended screening schedule for the next 5-10 years is provided to the patient in written form: see Patient Instructions/AVS.     No follow-ups on file. Subjective   Hypertension, high cholesterol, high triglycerides, diabetes with polyneuropathy, chronic kidney disease, COPD and CAD    Patient's complete Health Risk Assessment and screening values have been reviewed and are found in Flowsheets. The following problems were reviewed today and where indicated follow up appointments were made and/or referrals ordered. Positive Risk Factor Screenings with Interventions:                 Weight and Activity:  Physical Activity: Insufficiently Active    Days of Exercise per Week: 2 days    Minutes of Exercise per Session: 20 min     On average, how many days per week do you engage in moderate to strenuous exercise (like a brisk walk)?: 2 days  Have you lost any weight without trying in the past 3 months?: No  Body mass index: (!) 31.46    Obesity Interventions:  Proper diet and exercise discussed          Dentist Screen:  Have you seen the dentist within the past year?: (!) No    Intervention:  Advised to schedule with their dentist        Advanced Directives:  Do you have a Living Will?: (!) No    Intervention:  has an advanced directive - a copy HAS NOT been provided.                         Tobacco Use:  Tobacco Use: High Risk    Smoking Tobacco Use: Every Day    Smokeless Tobacco Use: Never    Passive Exposure: Not on file     E-cigarette/Vaping       Questions Responses    E-cigarette/Vaping Use Never User    Start Date     Passive Exposure     Quit Date     Counseling Given     Comments           Interventions:  Patient declined any further intervention or
exudate or posterior oropharyngeal erythema. Eyes:      General: No scleral icterus. Extraocular Movements: Extraocular movements intact. Conjunctiva/sclera: Conjunctivae normal.      Pupils: Pupils are equal, round, and reactive to light. Neck:      Vascular: No carotid bruit. Cardiovascular:      Rate and Rhythm: Normal rate and regular rhythm. Pulses: Normal pulses. Heart sounds: Normal heart sounds. No murmur heard. Pulmonary:      Effort: Pulmonary effort is normal. No respiratory distress. Breath sounds: Normal breath sounds. No wheezing or rhonchi. Chest:      Comments: Examination of the patient's right breast reveals some redness and irritation of the right nipple. There is no gynecomastia. There is no masses felt. Both breast appear to be symmetric in size. Abdominal:      General: Abdomen is flat. Bowel sounds are normal. There is no distension. Palpations: Abdomen is soft. There is no mass. Tenderness: There is no abdominal tenderness. There is no guarding or rebound. Hernia: No hernia is present. Musculoskeletal:         General: Normal range of motion. Cervical back: Normal range of motion and neck supple. Right lower leg: No edema. Left lower leg: No edema. Lymphadenopathy:      Cervical: No cervical adenopathy. Skin:     General: Skin is warm. Findings: No rash. Neurological:      General: No focal deficit present. Mental Status: He is alert and oriented to person, place, and time. Cranial Nerves: No cranial nerve deficit. Motor: No weakness. Deep Tendon Reflexes: Reflexes normal.   Psychiatric:         Mood and Affect: Mood normal.         Behavior: Behavior normal.         Thought Content: Thought content normal.         Judgment: Judgment normal.       Medical problems and test results were reviewed with the patient today. ASSESSMENT and PLAN    1. Right nipple soreness. Etiology unclear.

## 2023-03-23 NOTE — PATIENT INSTRUCTIONS
drink a day for women. Too much alcohol can cause health problems.     Manage other health problems such as diabetes, high blood pressure, and high cholesterol. If you think you may have a problem with alcohol or drug use, talk to your doctor. Medicines    Take your medicines exactly as prescribed. Call your doctor if you think you are having a problem with your medicine.     If your doctor recommends aspirin, take the amount directed each day. Make sure you take aspirin and not another kind of pain reliever, such as acetaminophen (Tylenol). When should you call for help? Call 911 if you have symptoms of a heart attack. These may include:    Chest pain or pressure, or a strange feeling in the chest.     Sweating.     Shortness of breath.     Pain, pressure, or a strange feeling in the back, neck, jaw, or upper belly or in one or both shoulders or arms.     Lightheadedness or sudden weakness.     A fast or irregular heartbeat. After you call 911, the  may tell you to chew 1 adult-strength or 2 to 4 low-dose aspirin. Wait for an ambulance. Do not try to drive yourself. Watch closely for changes in your health, and be sure to contact your doctor if you have any problems. Where can you learn more? Go to http://www.corrales.com/ and enter F075 to learn more about \"A Healthy Heart: Care Instructions. \"  Current as of: September 7, 2022               Content Version: 13.6  © 9399-0656 Healthwise, Incorporated. Care instructions adapted under license by [facility] ([facility]). If you have questions about a medical condition or this instruction, always ask your healthcare professional. [place] [address] any warranty or liability for your use of this information. Personalized Preventive Plan for Barbara Liao - 3/23/2023  Medicare offers a range of preventive health benefits.  Some of the tests and screenings are paid in full while other may be subject to a deductible,

## 2023-04-03 ENCOUNTER — TELEPHONE (OUTPATIENT)
Dept: ORTHOPEDIC SURGERY | Age: 78
End: 2023-04-03

## 2023-04-03 NOTE — TELEPHONE ENCOUNTER
LM for pt to bring MRI disc and report to his upcoming apt with [facility]. Could not find in the [employer] site.

## 2023-04-05 ENCOUNTER — OFFICE VISIT (OUTPATIENT)
Dept: ORTHOPEDIC SURGERY | Age: 78
End: 2023-04-05

## 2023-04-05 DIAGNOSIS — M79.671 PAIN OF RIGHT HEEL: ICD-10-CM

## 2023-04-05 DIAGNOSIS — M76.61 ACHILLES BURSITIS OF RIGHT LOWER EXTREMITY: ICD-10-CM

## 2023-04-05 DIAGNOSIS — M79.671 RIGHT FOOT PAIN: Primary | ICD-10-CM

## 2023-04-05 RX ORDER — NYSTATIN AND TRIAMCINOLONE ACETONIDE 100000; 1 [USP'U]/G; MG/G
OINTMENT TOPICAL
COMMUNITY
Start: 2023-03-23

## 2023-04-05 RX ORDER — LIDOCAINE 5% 5 G/100G
CREAM TOPICAL
Qty: 45 G | Refills: 2 | Status: SHIPPED | OUTPATIENT
Start: 2023-04-05

## 2023-04-05 NOTE — LETTER
[employer]  Arthritis oral choices: Individual Turmeric-Curcumin; Trevor Todd; Boswellia                           -or-   Combination [employer] Turmeric strength for joints that includes all 3 listed above     Magne topical Sports:   Balm or liquid Spray with frankincense/myrrh      Nerve medication options:   CBD, Alpha Lipoic acid, Lion's david and any other recommended neuropathic medication            Immune/healing possibilities:  Echinacea, Elderberry    As Needed: Dead sea bath salts, Essential Oils, scar cream, Gout and cramping medications    The above list of recommended medications is only a starting point. Please allow the experts at [facility] to make the final recommendations. Before using any of these medications, please make sure that you have no concerns, senstivities or allergies to the listed ingredients in each bottle/container. Also, please check with your pharmacist or your primary care physician regarding any and all possible interactions with your other daily medications. [employer] Locations:   [address] [place] [address], [address] [place] [address]            Sincerely,      Ko Reyes MD

## 2023-04-05 NOTE — PROGRESS NOTES
Name: Yayo Cobos  YOB: 1945  Gender: male  MRN: [id number]     CC: Right heel pain    HPI:   Mid February 2023: Onset right heel pain with no injury. Treated by Dr. Joselo Beavers  04/05/2023: Initial visit: Right posterior heel pain    ROS/Meds/PSH/PMH/FH/SH: reviewed today    Tobacco:  reports that he has been smoking cigarettes. He started smoking about 65 years ago. He has a 47.25 pack-year smoking history. He has never used smokeless tobacco.   Diagnosis: Type 2 diabetes: Diabetic neuropathy: ESRD stage III: COPD    Physical Examination:  Patient appears to be alert and oriented with acceptable appearance. No obvious distress or SOB  CV: appears to have acceptable vascular color and capillary refill  Neuro: appears to have diminished but protective light touch sensation   Skin: Posterior heel irritation but no open lesions or suggestion of infection  MS: Standing: Plantigrade: Gait protected  Right = posterior-posterolateral calcaneal tuberosity pain; no Achilles pain above the tuberosity insertion  Right = no plantar heel pain  Right = no gross motor loss    XR: Right: Standing AP lateral mortise ankle taken today with plantar and posterior heel enthesopathy; posterior heel density  XR Impression:  As above      Reviewed Test/Records/Documents:   03/17/2023: Right ankle MRI scan: Ordered by Dr. Lawrance Cogan: Read by Dr. Mcgraw Side:  1.   Notable terminal Achilles tendinopathy with partial-thickness insertional tear reactive edema calcaneus  [My read is difficult because they did a dangle position on MRI scans of slices are inconsistent: Posterior lateral calcaneal tuberosity edema appears to be more of a stress fracture then Achilles marrow reaction]  03/23/2023: Dr. Webster Signs: Reflects: Diabetes with polyneuropathy: Uric acid 6.6: Podiatry placed him in a walker boot:     Injection: Not applicable    Assessment:    Right heel pain: Insertional Achilles tendinopathy: Suspected calcaneal stress

## 2023-04-06 NOTE — PROGRESS NOTES
The patient was prescribed and fitted with a post op shoe, Airheel, and a heelbo for the right foot. Patient read and signed documenting they understand and agree to [facility]'s current DME return policy.

## 2023-04-19 ENCOUNTER — OFFICE VISIT (OUTPATIENT)
Dept: ORTHOPEDIC SURGERY | Age: 78
End: 2023-04-19

## 2023-04-19 DIAGNOSIS — M76.61 ACHILLES BURSITIS OF RIGHT LOWER EXTREMITY: Primary | ICD-10-CM

## 2023-04-19 NOTE — PROGRESS NOTES
Name: Vee Dill  YOB: 1945  Gender: male  MRN: [id number]     04/19/2023: He returns with no pain    HPI:   Mid February 2023: Onset right heel pain with no injury. Treated by Dr. Powers Session  04/05/2023: Initial visit: Right posterior heel pain    ROS/Meds/PSH/PMH/FH/SH: reviewed today    Tobacco:  reports that he has been smoking cigarettes. He started smoking about 65 years ago. He has a 47.25 pack-year smoking history. He has never used smokeless tobacco.   Diagnosis: Type 2 diabetes: Diabetic neuropathy: ESRD stage III: COPD    Physical Examination:  Patient appears to be alert and oriented with acceptable appearance. No obvious distress or SOB  CV: appears to have acceptable vascular color and capillary refill  Neuro: appears to have diminished but protective light touch sensation   Skin: No swelling  MS: Standing: Plantigrade: Gait protected  Right = no tuberosity, Achilles, plantar fascia or heel pain  Right = good motion; 5/5 strength    XR: Right: Standing AP lateral mortise ankle taken 04/05/2023 with plantar and posterior heel enthesopathy; posterior heel density  XR Impression:  As above      Reviewed Test/Records/Documents:   03/17/2023: Right ankle MRI scan: Ordered by Dr. Michael Lipoma: Read by Dr. Jaime Lugo:  1. Notable terminal Achilles tendinopathy with partial-thickness insertional tear reactive edema calcaneus  [My read is difficult because they did a dangle position on MRI scans of slices are inconsistent: Posterior lateral calcaneal tuberosity edema appears to be more of a stress fracture then Achilles marrow reaction]  03/23/2023: Dr. Magen Bautista: Reflects: Diabetes with polyneuropathy: Uric acid 6.6: Podiatry placed him in a walker boot:     Injection: Not applicable    Assessment:    Right heel pain: Suspected medial calcaneal stress fracture    Plan:   The patient and I discussed the above assessment. We explored treatment options.      I believe his problem all related to

## 2023-04-30 ASSESSMENT — ENCOUNTER SYMPTOMS
CONSTIPATION: 0
ABDOMINAL PAIN: 0
DIARRHEA: 0
COUGH: 0
PHOTOPHOBIA: 0
SHORTNESS OF BREATH: 0
SORE THROAT: 0
ABDOMINAL DISTENTION: 0

## 2023-05-03 SDOH — ECONOMIC STABILITY: HOUSING INSECURITY
IN THE LAST 12 MONTHS, WAS THERE A TIME WHEN YOU DID NOT HAVE A STEADY PLACE TO SLEEP OR SLEPT IN A SHELTER (INCLUDING NOW)?: NO

## 2023-05-03 SDOH — ECONOMIC STABILITY: TRANSPORTATION INSECURITY
IN THE PAST 12 MONTHS, HAS LACK OF TRANSPORTATION KEPT YOU FROM MEETINGS, WORK, OR FROM GETTING THINGS NEEDED FOR DAILY LIVING?: NO

## 2023-05-03 SDOH — ECONOMIC STABILITY: INCOME INSECURITY: HOW HARD IS IT FOR YOU TO PAY FOR THE VERY BASICS LIKE FOOD, HOUSING, MEDICAL CARE, AND HEATING?: SOMEWHAT HARD

## 2023-05-03 SDOH — ECONOMIC STABILITY: FOOD INSECURITY: WITHIN THE PAST 12 MONTHS, YOU WORRIED THAT YOUR FOOD WOULD RUN OUT BEFORE YOU GOT MONEY TO BUY MORE.: NEVER TRUE

## 2023-05-03 SDOH — ECONOMIC STABILITY: FOOD INSECURITY: WITHIN THE PAST 12 MONTHS, THE FOOD YOU BOUGHT JUST DIDN'T LAST AND YOU DIDN'T HAVE MONEY TO GET MORE.: NEVER TRUE

## 2023-05-04 ENCOUNTER — TELEMEDICINE (OUTPATIENT)
Dept: FAMILY MEDICINE CLINIC | Facility: CLINIC | Age: 78
End: 2023-05-04
Payer: MEDICARE

## 2023-05-04 DIAGNOSIS — E11.42 TYPE 2 DIABETES MELLITUS WITH DIABETIC POLYNEUROPATHY, WITHOUT LONG-TERM CURRENT USE OF INSULIN (HCC): ICD-10-CM

## 2023-05-04 DIAGNOSIS — N18.30 STAGE 3 CHRONIC KIDNEY DISEASE, UNSPECIFIED WHETHER STAGE 3A OR 3B CKD (HCC): ICD-10-CM

## 2023-05-04 DIAGNOSIS — E78.00 PURE HYPERCHOLESTEROLEMIA: Primary | ICD-10-CM

## 2023-05-04 DIAGNOSIS — N64.4 BREAST PAIN, RIGHT: ICD-10-CM

## 2023-05-04 DIAGNOSIS — J44.9 CHRONIC OBSTRUCTIVE PULMONARY DISEASE, UNSPECIFIED COPD TYPE (HCC): ICD-10-CM

## 2023-05-04 DIAGNOSIS — I25.10 CORONARY ARTERY DISEASE INVOLVING NATIVE CORONARY ARTERY OF NATIVE HEART WITHOUT ANGINA PECTORIS: ICD-10-CM

## 2023-05-04 DIAGNOSIS — E78.1 PURE HYPERGLYCERIDEMIA: ICD-10-CM

## 2023-05-04 PROBLEM — E16.2 HYPOGLYCEMIA: Status: RESOLVED | Noted: 2021-10-14 | Resolved: 2023-05-04

## 2023-05-04 PROBLEM — E11.65 UNCONTROLLED TYPE 2 DIABETES MELLITUS WITH HYPERGLYCEMIA (HCC): Status: RESOLVED | Noted: 2022-03-07 | Resolved: 2023-05-04

## 2023-05-04 PROCEDURE — 99443 PR PHYS/QHP TELEPHONE EVALUATION 21-30 MIN: CPT | Performed by: FAMILY MEDICINE

## 2023-05-04 ASSESSMENT — PATIENT HEALTH QUESTIONNAIRE - PHQ9
SUM OF ALL RESPONSES TO PHQ QUESTIONS 1-9: 0
2. FEELING DOWN, DEPRESSED OR HOPELESS: 0
SUM OF ALL RESPONSES TO PHQ QUESTIONS 1-9: 0
SUM OF ALL RESPONSES TO PHQ QUESTIONS 1-9: 0
SUM OF ALL RESPONSES TO PHQ9 QUESTIONS 1 & 2: 0
1. LITTLE INTEREST OR PLEASURE IN DOING THINGS: 0
SUM OF ALL RESPONSES TO PHQ QUESTIONS 1-9: 0

## 2023-05-04 NOTE — PROGRESS NOTES
Take 1 tablet by mouth daily 90 tablet 3    gabapentin (NEURONTIN) 600 MG tablet TAKE ONE TABLET BY MOUTH THREE TIMES A  tablet 3    JANUVIA 50 MG tablet TAKE ONE TABLET BY MOUTH ONE TIME DAILY 90 tablet 3    DULoxetine (CYMBALTA) 60 MG extended release capsule TAKE ONE CAPSULE BY MOUTH TWICE A  capsule 3    budesonide-formoterol (SYMBICORT) 160-4.5 MCG/ACT AERO Inhale 2 puffs into the lungs as needed (wheezing) 3 g 3    tamsulosin (FLOMAX) 0.4 MG capsule Take 1 capsule by mouth daily 90 capsule 1    dilTIAZem (TIAZAC) 120 MG extended release capsule Take 1 capsule by mouth every morning 90 capsule 3    docusate sodium (COLACE) 100 MG capsule Take 2 capsules by mouth at bedtime      Lancets MISC Please dispense lancets for patient to check FSBS four times a day      diphenhydrAMINE (BENADRYL) 25 MG capsule Take 1 capsule by mouth as needed       No current facility-administered medications for this visit.      Allergies   Allergen Reactions    Codeine Nausea And Vomiting     Patient Active Problem List   Diagnosis    Type 2 diabetes mellitus with diabetic polyneuropathy, without long-term current use of insulin ([facility])    Coronary artery disease involving native coronary artery of native heart without angina pectoris    Elevated alkaline phosphatase level    Radiologic findings of lung field, abnormal    COPD (chronic obstructive pulmonary disease) ([facility])    CKD (chronic kidney disease) stage 3, GFR 30-59 ml/min ([facility])    Tobacco abuse    Syncope and collapse    Hypoglycemia    Sinus tachycardia    Basal cell carcinoma of scalp    Incarcerated left inguinal hernia    Essential hypertension, benign    Syncope    Hiatal hernia    Dyslipidemia    Renal cyst, left    Uncontrolled type 2 diabetes mellitus with hyperglycemia ([facility])    Bilateral kidney stones     Past Medical History:   Diagnosis Date    CAD (coronary artery disease)     non-obstructive, Dr Leia Conrad-  denies hx of mi, never had heart cath    Chronic

## 2023-05-05 ENCOUNTER — OFFICE VISIT (OUTPATIENT)
Dept: CARDIOLOGY CLINIC | Age: 78
End: 2023-05-05
Payer: COMMERCIAL

## 2023-05-05 VITALS
SYSTOLIC BLOOD PRESSURE: 128 MMHG | WEIGHT: 226.6 LBS | HEIGHT: 71 IN | HEART RATE: 62 BPM | DIASTOLIC BLOOD PRESSURE: 68 MMHG | BODY MASS INDEX: 31.72 KG/M2

## 2023-05-05 DIAGNOSIS — I25.10 CORONARY ARTERY DISEASE INVOLVING NATIVE CORONARY ARTERY OF NATIVE HEART WITHOUT ANGINA PECTORIS: Primary | ICD-10-CM

## 2023-05-05 DIAGNOSIS — E11.42 TYPE 2 DIABETES MELLITUS WITH DIABETIC POLYNEUROPATHY, WITHOUT LONG-TERM CURRENT USE OF INSULIN (HCC): ICD-10-CM

## 2023-05-05 DIAGNOSIS — I10 ESSENTIAL HYPERTENSION, BENIGN: ICD-10-CM

## 2023-05-05 DIAGNOSIS — Z72.0 TOBACCO ABUSE: ICD-10-CM

## 2023-05-05 DIAGNOSIS — E78.5 DYSLIPIDEMIA: ICD-10-CM

## 2023-05-05 DIAGNOSIS — N18.30 STAGE 3 CHRONIC KIDNEY DISEASE, UNSPECIFIED WHETHER STAGE 3A OR 3B CKD (HCC): ICD-10-CM

## 2023-05-05 DIAGNOSIS — J44.9 CHRONIC OBSTRUCTIVE PULMONARY DISEASE, UNSPECIFIED COPD TYPE (HCC): ICD-10-CM

## 2023-05-05 PROCEDURE — 3078F DIAST BP <80 MM HG: CPT | Performed by: INTERNAL MEDICINE

## 2023-05-05 PROCEDURE — 1123F ACP DISCUSS/DSCN MKR DOCD: CPT | Performed by: INTERNAL MEDICINE

## 2023-05-05 PROCEDURE — 3044F HG A1C LEVEL LT 7.0%: CPT | Performed by: INTERNAL MEDICINE

## 2023-05-05 PROCEDURE — 3074F SYST BP LT 130 MM HG: CPT | Performed by: INTERNAL MEDICINE

## 2023-05-05 PROCEDURE — 99214 OFFICE O/P EST MOD 30 MIN: CPT | Performed by: INTERNAL MEDICINE

## 2023-05-08 ENCOUNTER — TELEPHONE (OUTPATIENT)
Dept: FAMILY MEDICINE CLINIC | Facility: CLINIC | Age: 78
End: 2023-05-08

## 2023-05-08 DIAGNOSIS — N64.4 BREAST PAIN, RIGHT: Primary | ICD-10-CM

## 2023-05-09 DIAGNOSIS — N64.4 BREAST PAIN, RIGHT: Primary | ICD-10-CM

## 2023-05-18 ENCOUNTER — HOSPITAL ENCOUNTER (OUTPATIENT)
Dept: MAMMOGRAPHY | Age: 78
End: 2023-05-18
Payer: MEDICARE

## 2023-05-18 ENCOUNTER — HOSPITAL ENCOUNTER (OUTPATIENT)
Dept: MAMMOGRAPHY | Age: 78
Discharge: HOME | End: 2023-05-18
Payer: MEDICARE

## 2023-05-18 DIAGNOSIS — N64.4 BREAST PAIN, RIGHT: ICD-10-CM

## 2023-05-18 PROCEDURE — 77066 DX MAMMO INCL CAD BI: CPT

## 2023-05-22 ENCOUNTER — TELEPHONE (OUTPATIENT)
Dept: FAMILY MEDICINE CLINIC | Facility: CLINIC | Age: 78
End: 2023-05-22

## 2023-05-30 RX ORDER — TAMSULOSIN HYDROCHLORIDE 0.4 MG/1
CAPSULE ORAL
Qty: 90 CAPSULE | Refills: 3 | Status: SHIPPED | OUTPATIENT
Start: 2023-05-30

## 2023-06-05 RX ORDER — TAMSULOSIN HYDROCHLORIDE 0.4 MG/1
CAPSULE ORAL
Qty: 90 CAPSULE | Refills: 1 | OUTPATIENT
Start: 2023-06-05

## 2023-09-08 ENCOUNTER — OFFICE VISIT (OUTPATIENT)
Dept: ORTHOPEDIC SURGERY | Age: 78
End: 2023-09-08

## 2023-09-08 DIAGNOSIS — M70.21 OLECRANON BURSITIS OF RIGHT ELBOW: Primary | ICD-10-CM

## 2023-09-08 RX ORDER — METHYLPREDNISOLONE 4 MG/1
TABLET ORAL
Qty: 1 KIT | Refills: 0 | Status: SHIPPED | OUTPATIENT
Start: 2023-09-08

## 2023-09-08 RX ORDER — MELOXICAM 15 MG/1
15 TABLET ORAL DAILY
Qty: 28 TABLET | Refills: 0 | Status: SHIPPED | OUTPATIENT
Start: 2023-09-08 | End: 2023-10-06

## 2023-09-08 NOTE — PROGRESS NOTES
[facility] Hand [facility] Note    Name: Leland Dias  YOB: 1945  Gender: male  MRN: [id number]      CC: Patient referred for evaluation of right elbow swelling    HPI: Leland Dias is a 78 y.o. male right hand dominant with a chief complaint of right elbow swelling. He reports this began approximately 3 weeks ago. He denies injury. He said he does not like the way this looks or feels. He would like to have this drained. He denies being on any blood thinners. ROS/Meds/PSH/PMH/FH/SH: I personally reviewed the patients standard intake form. Pertinents are discussed in the HPI    Physical Examination:  General: Awake and alert. HEENT: Normocephalic, atraumatic  CV/Pulm: Breathing even and unlabored  Skin: No obvious rashes noted. Lymphatic: No obvious evidence of lymphedema or lymphadenopathy    Musculoskeletal Exam:  Examination on the right upper extremity demonstrates cap refill < 5 seconds in all fingers  Patient has swelling to the right elbow consistent with olecranon bursitis. There is no erythema or drainage. There is no open areas. He is nontender to palpation. He has full range of motion to the right elbow. He denies paresthesia. He has good capillary refill. Imaging / Electrodiagnostic Tests:     None    Assessment:   1. Olecranon bursitis of right elbow        Plan:   We discussed the diagnosis and different treatment options. We discussed observation, therapy, antiinflammatory medications and other pertinent treatment modalities. After discussing in detail the patient elects to proceed with right elbow olecranon bursa aspiration. We will give him a compressive sleeve. We will give him Medrol and Mobic. I will reassess his progress back in 2 months. Procedure Note    The risk, benefits and alternatives of an aspiration were discussed. Risks and benefits were discussed. The patient consented for an aspiration.  The patient has been identified by

## 2023-09-08 NOTE — PROGRESS NOTES
The patient was prescribed and fitted with a Reparel elbow sleeve for the right elbow, size large. Patient read and signed documenting they understand and agree to [facility]'s current DME return policy.

## 2023-10-05 RX ORDER — DILTIAZEM HYDROCHLORIDE 120 MG/1
120 CAPSULE, EXTENDED RELEASE ORAL EVERY MORNING
Qty: 90 CAPSULE | Refills: 3 | Status: SHIPPED | OUTPATIENT
Start: 2023-10-05

## 2023-10-05 NOTE — TELEPHONE ENCOUNTER
Requested Prescriptions     Pending Prescriptions Disp Refills    dilTIAZem (TIAZAC) 120 MG extended release capsule 90 capsule 3     Sig: Take 1 capsule by mouth every morning

## 2023-10-05 NOTE — TELEPHONE ENCOUNTER
MEDICATION REFILL REQUEST      Name of Medication:  Tiadylt ER   Dose:  120 mg  Frequency:    Quantity:    Days' supply:  80      Pharmacy Name/Location:  [facility]/ Please call in today because he is out

## 2023-10-11 ENCOUNTER — OFFICE VISIT (OUTPATIENT)
Dept: ORTHOPEDIC SURGERY | Age: 78
End: 2023-10-11

## 2023-10-11 DIAGNOSIS — M70.21 OLECRANON BURSITIS OF RIGHT ELBOW: Primary | ICD-10-CM

## 2023-10-11 NOTE — PROGRESS NOTES
[facility] Note    Name: Namita Bañuelos  YOB: 1945  Gender: male  MRN: [id number]      Follow up visit:   1. Olecranon bursitis of right elbow        HPI: Namita Bañuelos is a 78 y.o. male who is following up for right olecranon bursitis. He was last seen September 8, 2023. His right elbow was drained per his request.  He has been wearing his compressive sleeve religiously. He said the fluid came back approximately 2 to 3 weeks ago. He reports his elbow is not painful but the swelling bothers him. ROS/Meds/PSH/PMH/FH/SH: I personally reviewed the patients standard intake form. Pertinents are discussed in the HPI    Physical Examination:    Musculoskeletal Examination:  Examination on the right upper extremity demonstrates cap refill < 5 seconds in all fingers  Patient has swelling to the right elbow consistent with olecranon bursitis. It is less swollen than his last visit. There is no erythema or drainage. He has full range of motion to the right elbow. He denies paresthesia. He has good capillary refill. Imaging / Electrodiagnostic Tests:     None    Assessment:     ICD-10-CM    1. Olecranon bursitis of right elbow  M70.21           Plan:   We discussed the diagnosis and different treatment options. We discussed observation, therapy, antiinflammatory medications and other pertinent treatment modalities. After discussing in detail the patient elects to proceed with left olecranon bursitis aspiration. He will continue with his compressive sleeve. We had a long discussion regarding surgical excision. He understands this is an involved surgery due to potential wound complications. Risk and benefits were addressed in detail. He wants to avoid surgery. I will reassess him as needed. Procedure Note    The risk, benefits and alternatives of an aspiration were discussed. Risks and benefits were discussed. The patient consented for an aspiration.  The

## 2023-10-19 ENCOUNTER — NURSE TRIAGE (OUTPATIENT)
Dept: OTHER | Facility: CLINIC | Age: 78
End: 2023-10-19

## 2023-10-19 NOTE — TELEPHONE ENCOUNTER
Location of patient: [place]    Received call from [address] at [facility] with Red Flag Complaint. Subjective: Caller states \"constipation\"     Current Symptoms: Last BM 5 days ago. Nausea, 1 episode of vomiting on Friday. Intermittent chills without fever. Intermittent abdominal pain, currently denies. Onset: 5 days ago;     Pain Severity: 0/10; Temperature: denies    What has been tried: Citrucel     Recommended disposition: See PCP within 24 Hours    Care advice provided, patient verbalizes understanding; denies any other questions or concerns; instructed to call back for any new or worsening symptoms. Patient/Caller agrees with recommended disposition; writer provided warm transfer to Uli Murphy at [facility] for appointment scheduling    Attention Provider: Thank you for allowing me to participate in the care of your patient. The patient was connected to triage in response to information provided to the ECC/PSC. Please do not respond through this encounter as the response is not directed to a shared pool.       Reason for Disposition   Abdomen is more swollen than usual   Unexplained nausea    Protocols used: Constipation-ADULT-AH, Nausea-ADULT-AH

## 2023-10-24 RX ORDER — GABAPENTIN 600 MG/1
TABLET ORAL
Qty: 270 TABLET | Refills: 3 | Status: SHIPPED | OUTPATIENT
Start: 2023-10-24 | End: 2024-01-22

## 2023-10-30 RX ORDER — SITAGLIPTIN 50 MG/1
TABLET, FILM COATED ORAL
Qty: 90 TABLET | Refills: 3 | OUTPATIENT
Start: 2023-10-30

## 2023-10-30 RX ORDER — GLIPIZIDE 5 MG/1
5 TABLET, FILM COATED, EXTENDED RELEASE ORAL DAILY
Qty: 90 TABLET | Refills: 0 | Status: SHIPPED | OUTPATIENT
Start: 2023-10-30

## 2023-10-30 RX ORDER — GLIPIZIDE 5 MG/1
5 TABLET, FILM COATED, EXTENDED RELEASE ORAL DAILY
Qty: 90 TABLET | Refills: 3 | OUTPATIENT
Start: 2023-10-30

## 2023-11-05 ASSESSMENT — ENCOUNTER SYMPTOMS
DIARRHEA: 0
SORE THROAT: 0
COUGH: 0
ABDOMINAL PAIN: 0
PHOTOPHOBIA: 0
SHORTNESS OF BREATH: 0
ABDOMINAL DISTENTION: 0
CONSTIPATION: 0

## 2023-11-05 NOTE — PROGRESS NOTES
Social History     Tobacco Use    Smoking status: Every Day     Packs/day: 0.75     Years: 63.00     Additional pack years: 0.00     Total pack years: 47.25     Types: Cigarettes     Start date: 1958    Smokeless tobacco: Never   Substance Use Topics    Alcohol use: Not Currently     Alcohol/week: 0.0 standard drinks of alcohol       ROS:    Review of Systems   Constitutional:  Negative for appetite change, chills, diaphoresis and fatigue. HENT:  Negative for congestion, mouth sores, nosebleeds, sore throat and tinnitus. Eyes:  Negative for photophobia and visual disturbance. Respiratory:  Negative for cough and shortness of breath. Cardiovascular:  Negative for chest pain, palpitations and leg swelling. Gastrointestinal:  Negative for abdominal distention, abdominal pain, constipation and diarrhea. Endocrine: Negative for cold intolerance, heat intolerance, polydipsia and polyuria. Genitourinary:  Negative for dysuria and hematuria. Musculoskeletal:  Negative for arthralgias, joint swelling and myalgias. Skin:  Negative for rash. Allergic/Immunologic: Negative for environmental allergies and food allergies. Neurological:  Negative for dizziness, seizures, syncope and light-headedness. Hematological:  Negative for adenopathy. Does not bruise/bleed easily. Psychiatric/Behavioral:  Negative for agitation, behavioral problems, dysphoric mood and hallucinations. The patient is not nervous/anxious. PHYSICAL EXAM:     Vitals:    11/07/23 1128   BP: 128/78   Pulse: 85   Weight: 100.7 kg (222 lb)   Height: 1.803 m (5' 11\")      Wt Readings from Last 3 Encounters:   11/07/23 100.7 kg (222 lb)   05/05/23 102.8 kg (226 lb 9.6 oz)   03/23/23 102.3 kg (225 lb 9.6 oz)      BP Readings from Last 3 Encounters:   11/07/23 128/78   05/05/23 128/68   03/23/23 126/74        Physical Exam  Constitutional:       Appearance: Normal appearance. He is normal weight.    HENT:      Head: Normocephalic

## 2023-11-07 ENCOUNTER — OFFICE VISIT (OUTPATIENT)
Age: 78
End: 2023-11-07
Payer: MEDICARE

## 2023-11-07 VITALS
HEART RATE: 85 BPM | HEIGHT: 71 IN | DIASTOLIC BLOOD PRESSURE: 78 MMHG | SYSTOLIC BLOOD PRESSURE: 128 MMHG | BODY MASS INDEX: 31.08 KG/M2 | WEIGHT: 222 LBS

## 2023-11-07 DIAGNOSIS — R00.0 SINUS TACHYCARDIA: ICD-10-CM

## 2023-11-07 DIAGNOSIS — E78.5 DYSLIPIDEMIA: ICD-10-CM

## 2023-11-07 DIAGNOSIS — Z72.0 TOBACCO ABUSE: ICD-10-CM

## 2023-11-07 DIAGNOSIS — J44.9 CHRONIC OBSTRUCTIVE PULMONARY DISEASE, UNSPECIFIED COPD TYPE (HCC): ICD-10-CM

## 2023-11-07 DIAGNOSIS — I25.10 CORONARY ARTERY DISEASE INVOLVING NATIVE CORONARY ARTERY OF NATIVE HEART WITHOUT ANGINA PECTORIS: Primary | ICD-10-CM

## 2023-11-07 DIAGNOSIS — I10 ESSENTIAL HYPERTENSION, BENIGN: ICD-10-CM

## 2023-11-07 PROCEDURE — 3078F DIAST BP <80 MM HG: CPT | Performed by: INTERNAL MEDICINE

## 2023-11-07 PROCEDURE — G8417 CALC BMI ABV UP PARAM F/U: HCPCS | Performed by: INTERNAL MEDICINE

## 2023-11-07 PROCEDURE — G8484 FLU IMMUNIZE NO ADMIN: HCPCS | Performed by: INTERNAL MEDICINE

## 2023-11-07 PROCEDURE — 4004F PT TOBACCO SCREEN RCVD TLK: CPT | Performed by: INTERNAL MEDICINE

## 2023-11-07 PROCEDURE — 99214 OFFICE O/P EST MOD 30 MIN: CPT | Performed by: INTERNAL MEDICINE

## 2023-11-07 PROCEDURE — 3074F SYST BP LT 130 MM HG: CPT | Performed by: INTERNAL MEDICINE

## 2023-11-07 PROCEDURE — G8427 DOCREV CUR MEDS BY ELIG CLIN: HCPCS | Performed by: INTERNAL MEDICINE

## 2023-11-07 PROCEDURE — 3023F SPIROM DOC REV: CPT | Performed by: INTERNAL MEDICINE

## 2023-11-07 PROCEDURE — 1123F ACP DISCUSS/DSCN MKR DOCD: CPT | Performed by: INTERNAL MEDICINE

## 2023-11-07 PROCEDURE — 93000 ELECTROCARDIOGRAM COMPLETE: CPT | Performed by: INTERNAL MEDICINE

## 2023-11-27 RX ORDER — DULOXETIN HYDROCHLORIDE 60 MG/1
60 CAPSULE, DELAYED RELEASE ORAL 2 TIMES DAILY
Qty: 180 CAPSULE | Refills: 3 | Status: SHIPPED | OUTPATIENT
Start: 2023-11-27

## 2023-11-27 RX ORDER — DULOXETIN HYDROCHLORIDE 60 MG/1
CAPSULE, DELAYED RELEASE ORAL
Qty: 180 CAPSULE | Refills: 3 | OUTPATIENT
Start: 2023-11-27

## 2023-12-07 ENCOUNTER — NURSE ONLY (OUTPATIENT)
Dept: FAMILY MEDICINE CLINIC | Facility: CLINIC | Age: 78
End: 2023-12-07
Payer: MEDICARE

## 2023-12-07 DIAGNOSIS — E78.1 PURE HYPERGLYCERIDEMIA: ICD-10-CM

## 2023-12-07 DIAGNOSIS — N18.30 STAGE 3 CHRONIC KIDNEY DISEASE, UNSPECIFIED WHETHER STAGE 3A OR 3B CKD (HCC): ICD-10-CM

## 2023-12-07 DIAGNOSIS — E11.42 TYPE 2 DIABETES MELLITUS WITH DIABETIC POLYNEUROPATHY, WITHOUT LONG-TERM CURRENT USE OF INSULIN (HCC): ICD-10-CM

## 2023-12-07 DIAGNOSIS — E78.00 PURE HYPERCHOLESTEROLEMIA: ICD-10-CM

## 2023-12-07 LAB
ALBUMIN SERPL-MCNC: 3.4 G/DL (ref 3.2–4.6)
ALBUMIN/GLOB SERPL: 1.1 (ref 0.4–1.6)
ALP SERPL-CCNC: 115 U/L (ref 50–136)
ALT SERPL-CCNC: 19 U/L (ref 12–65)
ANION GAP SERPL CALC-SCNC: 6 MMOL/L (ref 2–11)
AST SERPL-CCNC: 11 U/L (ref 15–37)
BILIRUB SERPL-MCNC: 0.6 MG/DL (ref 0.2–1.1)
BUN SERPL-MCNC: 33 MG/DL (ref 8–23)
CALCIUM SERPL-MCNC: 9.6 MG/DL (ref 8.3–10.4)
CHLORIDE SERPL-SCNC: 108 MMOL/L (ref 103–113)
CHOLEST SERPL-MCNC: 138 MG/DL
CO2 SERPL-SCNC: 27 MMOL/L (ref 21–32)
CREAT SERPL-MCNC: 2 MG/DL (ref 0.8–1.5)
EST. AVERAGE GLUCOSE BLD GHB EST-MCNC: 143 MG/DL
GLOBULIN SER CALC-MCNC: 3.2 G/DL (ref 2.8–4.5)
GLUCOSE SERPL-MCNC: 149 MG/DL (ref 65–100)
HBA1C MFR BLD: 6.6 % (ref 4.8–5.6)
HDLC SERPL-MCNC: 45 MG/DL (ref 40–60)
HDLC SERPL: 3.1
LDLC SERPL CALC-MCNC: 72.4 MG/DL
POTASSIUM SERPL-SCNC: 4.5 MMOL/L (ref 3.5–5.1)
PROT SERPL-MCNC: 6.6 G/DL (ref 6.3–8.2)
SODIUM SERPL-SCNC: 141 MMOL/L (ref 136–146)
TRIGL SERPL-MCNC: 103 MG/DL (ref 35–150)
VLDLC SERPL CALC-MCNC: 20.6 MG/DL (ref 6–23)

## 2023-12-07 PROCEDURE — 36415 COLL VENOUS BLD VENIPUNCTURE: CPT | Performed by: FAMILY MEDICINE

## 2023-12-11 RX ORDER — DULOXETIN HYDROCHLORIDE 60 MG/1
60 CAPSULE, DELAYED RELEASE ORAL 2 TIMES DAILY
Qty: 180 CAPSULE | Refills: 3 | OUTPATIENT
Start: 2023-12-11

## 2023-12-14 ENCOUNTER — OFFICE VISIT (OUTPATIENT)
Dept: FAMILY MEDICINE CLINIC | Facility: CLINIC | Age: 78
End: 2023-12-14
Payer: MEDICARE

## 2023-12-14 VITALS
BODY MASS INDEX: 31.81 KG/M2 | HEIGHT: 71 IN | WEIGHT: 227.2 LBS | DIASTOLIC BLOOD PRESSURE: 82 MMHG | SYSTOLIC BLOOD PRESSURE: 126 MMHG

## 2023-12-14 DIAGNOSIS — E78.00 PURE HYPERCHOLESTEROLEMIA: ICD-10-CM

## 2023-12-14 DIAGNOSIS — I25.10 CORONARY ARTERY DISEASE INVOLVING NATIVE CORONARY ARTERY OF NATIVE HEART WITHOUT ANGINA PECTORIS: ICD-10-CM

## 2023-12-14 DIAGNOSIS — E78.1 PURE HYPERGLYCERIDEMIA: ICD-10-CM

## 2023-12-14 DIAGNOSIS — L60.0 INGROWING TOENAIL: ICD-10-CM

## 2023-12-14 DIAGNOSIS — I10 PRIMARY HYPERTENSION: ICD-10-CM

## 2023-12-14 DIAGNOSIS — N18.30 STAGE 3 CHRONIC KIDNEY DISEASE, UNSPECIFIED WHETHER STAGE 3A OR 3B CKD (HCC): ICD-10-CM

## 2023-12-14 DIAGNOSIS — E11.42 TYPE 2 DIABETES MELLITUS WITH DIABETIC POLYNEUROPATHY, WITHOUT LONG-TERM CURRENT USE OF INSULIN (HCC): Primary | ICD-10-CM

## 2023-12-14 PROCEDURE — G8484 FLU IMMUNIZE NO ADMIN: HCPCS | Performed by: FAMILY MEDICINE

## 2023-12-14 PROCEDURE — 3074F SYST BP LT 130 MM HG: CPT | Performed by: FAMILY MEDICINE

## 2023-12-14 PROCEDURE — 99213 OFFICE O/P EST LOW 20 MIN: CPT | Performed by: FAMILY MEDICINE

## 2023-12-14 PROCEDURE — 4004F PT TOBACCO SCREEN RCVD TLK: CPT | Performed by: FAMILY MEDICINE

## 2023-12-14 PROCEDURE — G8417 CALC BMI ABV UP PARAM F/U: HCPCS | Performed by: FAMILY MEDICINE

## 2023-12-14 PROCEDURE — 3079F DIAST BP 80-89 MM HG: CPT | Performed by: FAMILY MEDICINE

## 2023-12-14 PROCEDURE — 1123F ACP DISCUSS/DSCN MKR DOCD: CPT | Performed by: FAMILY MEDICINE

## 2023-12-14 PROCEDURE — 3044F HG A1C LEVEL LT 7.0%: CPT | Performed by: FAMILY MEDICINE

## 2023-12-14 PROCEDURE — G8427 DOCREV CUR MEDS BY ELIG CLIN: HCPCS | Performed by: FAMILY MEDICINE

## 2023-12-14 ASSESSMENT — PATIENT HEALTH QUESTIONNAIRE - PHQ9
SUM OF ALL RESPONSES TO PHQ QUESTIONS 1-9: 0
SUM OF ALL RESPONSES TO PHQ9 QUESTIONS 1 & 2: 0
2. FEELING DOWN, DEPRESSED OR HOPELESS: 0
SUM OF ALL RESPONSES TO PHQ QUESTIONS 1-9: 0
1. LITTLE INTEREST OR PLEASURE IN DOING THINGS: 0

## 2023-12-14 NOTE — PROGRESS NOTES
General: Normal range of motion. Cervical back: Normal range of motion and neck supple. Right lower leg: No edema. Left lower leg: No edema. Skin:     General: Skin is warm. Findings: No rash. Neurological:      Mental Status: He is alert and oriented to person, place, and time. Psychiatric:         Mood and Affect: Mood normal.         Behavior: Behavior normal.         Thought Content: Thought content normal.         Judgment: Judgment normal.         Medical problems and test results were reviewed with the patient today. ASSESSMENT and PLAN    1. Diabetes with polyneuropathy. A1c is 6.6. Previously 6.3. Continue current therapy. Encourage yearly retinal exams and daily feet exams. 2.  High cholesterol. LDL 72. Previously 40. Continue Lipitor. Consider increasing at follow-up if clinically warranted. 3.  High triglycerides. Triglycerides are 103. Continue dietary focus. 4.  Hypertension. /82. Electrolytes are normal.  Continue current therapy. 5.  CAD. Per cardiology. No chest pain. 6.  Chronic kidney disease. Creatinine is 2.0. Previously 2.0. Continue follow-up with nephrology. 7.  Ingrown toenails. Refer to podiatry at patient's request.  He would like his nails clipped as well. Elements of this note have been dictated using speech recognition software. As a result, errors of speech recognition may have occurred.

## 2024-01-22 RX ORDER — SITAGLIPTIN 50 MG/1
50 TABLET, FILM COATED ORAL DAILY
Qty: 90 TABLET | Refills: 3 | Status: SHIPPED | OUTPATIENT
Start: 2024-01-22

## 2024-01-22 RX ORDER — PIOGLITAZONEHYDROCHLORIDE 30 MG/1
30 TABLET ORAL NIGHTLY
Qty: 90 TABLET | Refills: 3 | Status: SHIPPED | OUTPATIENT
Start: 2024-01-22

## 2024-01-22 RX ORDER — ATORVASTATIN CALCIUM 20 MG/1
TABLET, FILM COATED ORAL
Qty: 90 TABLET | Refills: 3 | Status: SHIPPED | OUTPATIENT
Start: 2024-01-22

## 2024-01-22 RX ORDER — GLIPIZIDE 5 MG/1
5 TABLET, FILM COATED, EXTENDED RELEASE ORAL DAILY
Qty: 90 TABLET | Refills: 3 | Status: SHIPPED | OUTPATIENT
Start: 2024-01-22

## 2024-01-25 ENCOUNTER — APPOINTMENT (OUTPATIENT)
Dept: GENERAL RADIOLOGY | Age: 79
End: 2024-01-25
Payer: MEDICARE

## 2024-01-25 ENCOUNTER — HOSPITAL ENCOUNTER (EMERGENCY)
Age: 79
Discharge: HOME | End: 2024-01-25
Attending: EMERGENCY MEDICINE
Payer: MEDICARE

## 2024-01-25 VITALS
RESPIRATION RATE: 29 BRPM | TEMPERATURE: 99.1 F | HEIGHT: 71 IN | BODY MASS INDEX: 31.92 KG/M2 | HEART RATE: 99 BPM | DIASTOLIC BLOOD PRESSURE: 78 MMHG | SYSTOLIC BLOOD PRESSURE: 121 MMHG | WEIGHT: 228 LBS | OXYGEN SATURATION: 94 %
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
## 2024-01-25 DIAGNOSIS — J44.9 CHRONIC OBSTRUCTIVE PULMONARY DISEASE, UNSPECIFIED COPD TYPE (HCC): ICD-10-CM

## 2024-01-25 DIAGNOSIS — R33.9 URINARY RETENTION: ICD-10-CM

## 2024-01-25 LAB
ALBUMIN SERPL-MCNC: 3.7 G/DL (ref 3.2–4.6)
ALBUMIN/GLOB SERPL: 1.3 (ref 0.4–1.6)
ALP SERPL-CCNC: 115 U/L (ref 45–117)
ALT SERPL-CCNC: 6 U/L (ref 13–61)
ANION GAP SERPL CALC-SCNC: 12 MMOL/L (ref 2–11)
APPEARANCE UR: CLEAR
AST SERPL-CCNC: 25 U/L (ref 15–37)
BASOPHILS # BLD: 0 K/UL (ref 0–0.2)
BASOPHILS NFR BLD: 0 % (ref 0–2)
BILIRUB SERPL-MCNC: 0.4 MG/DL (ref 0.2–1.1)
BILIRUB UR QL: NEGATIVE
BUN SERPL-MCNC: 28 MG/DL (ref 8–23)
CALCIUM SERPL-MCNC: 8.9 MG/DL (ref 8.3–10.4)
CHLORIDE SERPL-SCNC: 103 MMOL/L (ref 98–107)
CO2 SERPL-SCNC: 23 MMOL/L (ref 21–32)
COLOR UR: YELLOW
CREAT SERPL-MCNC: 1.86 MG/DL (ref 0.8–1.5)
DIFFERENTIAL METHOD BLD: ABNORMAL
EOSINOPHIL # BLD: 0 K/UL (ref 0–0.8)
EOSINOPHIL NFR BLD: 0 % (ref 0.5–7.8)
ERYTHROCYTE [DISTWIDTH] IN BLOOD BY AUTOMATED COUNT: 13.3 % (ref 11.9–14.6)
GLOBULIN SER CALC-MCNC: 2.9 G/DL (ref 2.8–4.5)
GLUCOSE BLD STRIP.AUTO-MCNC: 146 MG/DL (ref 65–100)
GLUCOSE SERPL-MCNC: 138 MG/DL (ref 65–100)
GLUCOSE UR STRIP.AUTO-MCNC: 100 MG/DL
HCT VFR BLD AUTO: 46.1 % (ref 41.1–50.3)
HGB BLD-MCNC: 14.9 G/DL (ref 13.6–17.2)
HGB UR QL STRIP: NEGATIVE
IMM GRANULOCYTES # BLD AUTO: 0 K/UL (ref 0–0.5)
IMM GRANULOCYTES NFR BLD AUTO: 0 % (ref 0–5)
KETONES UR QL STRIP.AUTO: NEGATIVE MG/DL
LACTATE SERPL-SCNC: 1.4 MMOL/L (ref 0.4–2)
LEUKOCYTE ESTERASE UR QL STRIP.AUTO: NEGATIVE
LIPASE SERPL-CCNC: 13 U/L (ref 13–60)
LYMPHOCYTES # BLD: 0.7 K/UL (ref 0.5–4.6)
LYMPHOCYTES NFR BLD: 11 % (ref 13–44)
MCH RBC QN AUTO: 32 PG (ref 26.1–32.9)
MCHC RBC AUTO-ENTMCNC: 32.3 G/DL (ref 31.4–35)
MCV RBC AUTO: 99.1 FL (ref 82–102)
MONOCYTES # BLD: 1 K/UL (ref 0.1–1.3)
MONOCYTES NFR BLD: 17 % (ref 4–12)
NEUTS SEG # BLD: 4.2 K/UL (ref 1.7–8.2)
NEUTS SEG NFR BLD: 71 % (ref 43–78)
NITRITE UR QL STRIP.AUTO: NEGATIVE
NRBC # BLD: 0 K/UL (ref 0–0.2)
PH UR STRIP: 7 (ref 5–9)
PLATELET # BLD AUTO: 174 K/UL (ref 150–450)
PMV BLD AUTO: 10.8 FL (ref 9.4–12.3)
POTASSIUM SERPL-SCNC: 4.7 MMOL/L (ref 3.5–5.1)
PROCALCITONIN SERPL-MCNC: 0.06 NG/ML (ref 0–0.49)
PROT SERPL-MCNC: 6.6 G/DL (ref 6.4–8.2)
PROT UR STRIP-MCNC: NEGATIVE MG/DL
RBC # BLD AUTO: 4.65 M/UL (ref 4.23–5.6)
SERVICE CMNT-IMP: ABNORMAL
SODIUM SERPL-SCNC: 138 MMOL/L (ref 133–143)
SP GR UR REFRACTOMETRY: 1.02 (ref 1–1.02)
UROBILINOGEN UR QL STRIP.AUTO: 0.2 EU/DL (ref 0.2–1)
WBC # BLD AUTO: 5.9 K/UL (ref 4.3–11.1)

## 2024-01-25 PROCEDURE — 51702 INSERT TEMP BLADDER CATH: CPT

## 2024-01-25 PROCEDURE — 99284 EMERGENCY DEPT VISIT MOD MDM: CPT

## 2024-01-25 PROCEDURE — 87040 BLOOD CULTURE FOR BACTERIA: CPT

## 2024-01-25 PROCEDURE — 82962 GLUCOSE BLOOD TEST: CPT

## 2024-01-25 PROCEDURE — 83605 ASSAY OF LACTIC ACID: CPT

## 2024-01-25 PROCEDURE — 85025 COMPLETE CBC W/AUTO DIFF WBC: CPT

## 2024-01-25 PROCEDURE — 81003 URINALYSIS AUTO W/O SCOPE: CPT

## 2024-01-25 PROCEDURE — 83690 ASSAY OF LIPASE: CPT

## 2024-01-25 PROCEDURE — 71045 X-RAY EXAM CHEST 1 VIEW: CPT

## 2024-01-25 PROCEDURE — 84145 PROCALCITONIN (PCT): CPT

## 2024-01-25 PROCEDURE — 80053 COMPREHEN METABOLIC PANEL: CPT

## 2024-01-25 RX ORDER — TAMSULOSIN HYDROCHLORIDE 0.4 MG/1
0.4 CAPSULE ORAL DAILY
Qty: 10 CAPSULE | Refills: 0 | Status: SHIPPED | OUTPATIENT
Start: 2024-01-25

## 2024-01-25 ASSESSMENT — PAIN SCALES - GENERAL: PAINLEVEL_OUTOF10: 10

## 2024-01-25 ASSESSMENT — ENCOUNTER SYMPTOMS
EYE REDNESS: 0
VOICE CHANGE: 0
TROUBLE SWALLOWING: 0
VOMITING: 0
CHEST TIGHTNESS: 0
APNEA: 0
ABDOMINAL PAIN: 1
COLOR CHANGE: 0
NAUSEA: 0
BACK PAIN: 0
STRIDOR: 0
COUGH: 1

## 2024-01-25 ASSESSMENT — PAIN - FUNCTIONAL ASSESSMENT: PAIN_FUNCTIONAL_ASSESSMENT: 0-10

## 2024-01-25 NOTE — ED NOTES
I have reviewed discharge instructions with the patient and spouse.  The patient and spouse verbalized understanding.    Patient left ED via Discharge Method: wheelchair to Home with spouse.    Opportunity for questions and clarification provided.       Patient given 1 scripts.         To continue your aftercare when you leave the hospital, you may receive an automated call from our care team to check in on how you are doing.  This is a free service and part of our promise to provide the best care and service to meet your aftercare needs.” If you have questions, or wish to unsubscribe from this service please call [phone].  Thank you for Choosing our [facility] Emergency Department.

## 2024-01-25 NOTE — ED PROVIDER NOTES
Globulin 2.9 2.8 - 4.5 g/dL    Albumin/Globulin Ratio 1.3 0.4 - 1.6     Lipase   Result Value Ref Range    Lipase 13 13 - 60 U/L   Procalcitonin   Result Value Ref Range    Procalcitonin 0.06 0.00 - 0.49 ng/mL   Urinalysis w rflx microscopic   Result Value Ref Range    Color, UA YELLOW      Appearance CLEAR      Specific Gravity, UA 1.020 1.001 - 1.023      pH, Urine 7.0 5.0 - 9.0      Protein, UA Negative NEG mg/dL    Glucose,  mg/dL    Ketones, Urine Negative NEG mg/dL    Bilirubin Urine Negative NEG      Blood, Urine Negative NEG      Urobilinogen, Urine 0.2 0.2 - 1.0 EU/dL    Nitrite, Urine Negative NEG      Leukocyte Esterase, Urine Negative NEG     Lactic Acid   Result Value Ref Range    Lactic Acid 1.40 0.4 - 2.0 mmol/L   POCT Glucose   Result Value Ref Range    POC Glucose 146 (H) 65 - 100 mg/dL    Performed by: Carmen         XR CHEST PORTABLE   Final Result   Mild pulmonary edema              NIH Stroke Scale  Interval: Baseline  Level of Consciousness (1a): Alert  LOC Questions (1b): Answers both correctly  LOC Commands (1c): Performs both tasks correctly  Best Gaze (2): Normal  Visual (3): No visual loss  Facial Palsy (4): Normal symmetrical movement  Motor Arm, Left (5a): No drift  Motor Arm, Right (5b): No drift  Motor Leg, Left (6a): No drift  Motor Leg, Right (6b): No drift  Limb Ataxia (7): Absent  Sensory (8): Normal  Best Language (9): No aphasia  Dysarthria (10): Normal  Extinction and Inattention (11): No abnormality  Total: 0            Voice dictation software was used during the making of this note.  This software is not perfect and grammatical and other typographical errors may be present.  This note has not been completely proofread for errors.     Dante Best MD  01/25/24 [id number]       Dante Best MD  01/25/24 [id number]

## 2024-01-25 NOTE — DISCHARGE INSTRUCTIONS
Keep Matos catheter in place  Follow-up with urology  Continue your inhalers as prescribed for your COPD  Return to the ER for any new, worsening or life-threatening symptoms

## 2024-01-25 NOTE — ED TRIAGE NOTES
Pt via [facility] from home for reports of urinary retention & multiple falls. EMS reports initial call from pt's wife was for stroke but EMS denies any neuro deficits. Pt main concern is urinary retention, stating he last peed yesterday. EMS also reports multiple falls over the last few days which is not normal for him. Pt stating he is unsure why he fell but is unable to get up due to weakness when he does fall. EMS reports fire rescue came to his house earlier for lift assist but pt did not want to be transferred to ER at that time.

## 2024-01-28 LAB
BACTERIA SPEC CULT: NORMAL
SERVICE CMNT-IMP: NORMAL

## 2024-01-29 ENCOUNTER — OFFICE VISIT (OUTPATIENT)
Dept: UROLOGY | Age: 79
End: 2024-01-29
Payer: MEDICARE

## 2024-01-29 DIAGNOSIS — R33.9 URINARY RETENTION: Primary | ICD-10-CM

## 2024-01-29 PROCEDURE — G8417 CALC BMI ABV UP PARAM F/U: HCPCS | Performed by: NURSE PRACTITIONER

## 2024-01-29 PROCEDURE — G8484 FLU IMMUNIZE NO ADMIN: HCPCS | Performed by: NURSE PRACTITIONER

## 2024-01-29 PROCEDURE — G8428 CUR MEDS NOT DOCUMENT: HCPCS | Performed by: NURSE PRACTITIONER

## 2024-01-29 PROCEDURE — 4004F PT TOBACCO SCREEN RCVD TLK: CPT | Performed by: NURSE PRACTITIONER

## 2024-01-29 PROCEDURE — 99214 OFFICE O/P EST MOD 30 MIN: CPT | Performed by: NURSE PRACTITIONER

## 2024-01-29 PROCEDURE — 1123F ACP DISCUSS/DSCN MKR DOCD: CPT | Performed by: NURSE PRACTITIONER

## 2024-01-29 RX ORDER — CEPHALEXIN 250 MG/1
250 CAPSULE ORAL 3 TIMES DAILY
Qty: 21 CAPSULE | Refills: 0 | Status: SHIPPED | OUTPATIENT
Start: 2024-01-29 | End: 2024-02-05

## 2024-01-29 RX ORDER — PHENAZOPYRIDINE HYDROCHLORIDE 200 MG/1
200 TABLET, FILM COATED ORAL 3 TIMES DAILY PRN
Qty: 12 TABLET | Refills: 0 | Status: SHIPPED | OUTPATIENT
Start: 2024-01-29 | End: 2024-02-02

## 2024-01-29 ASSESSMENT — ENCOUNTER SYMPTOMS
BACK PAIN: 0
NAUSEA: 0

## 2024-01-29 NOTE — PROGRESS NOTES
[facility]  [address] [place] [id number]  [phone]          Hupmhrey Villalpando  : 1945    Chief Complaint   Patient presents with    Other     Cath problems          HPI     Humphrey Villalpando is a 78 y.o. male  Here due to inability to void.  He was seen in the ER last Thursday due to episode of urinary retention.  He was scheduled to have that catheter out on Thursday.  He has been experiencing some catheter discomfort and was hoping that if he came by the office today that we will go ahead and remove the catheter.  He complains of burning and urethral pain.  The urine overall is ena in color.  He is accompanied by his wife.    He tells me last Thursday he was unable to void.  That went on for most of the day.  He was finally seen in the ER for catheter had to be placed.  He has done okay until the last 24 hours with the catheter itself.  He denies any hematuria fever or chills.  He has had previous urinary retention in the past it has been a year and a half since that occurred.    Other significant history of stone disease and renal cyst.  He has seen Dr. Lynn for those.  Farts his PSAs are concerned they have been stable.  They typically run in the 2 range.  He is on Flomax 0.4 mg I have instructed him to continue this.      Past Medical History:   Diagnosis Date    CAD (coronary artery disease)     non-obstructive, Dr Ramos-  denies hx of mi, never had heart cath    Chronic airway obstruction, not elsewhere classified 2022    symbicort inh- very seldom, does not require supplemental oxygen- smokes 0.75- ppd since age 13    Essential hypertension, benign 2015    H/O echocardiogram 10/15/2021    LVEF 60-65%    History of colonic polyps     Inflammatory and toxic neuropathy, unspecified 2015    Kidney disease     Kidney stone     Neoplasm of uncertain behavior of skin     Neuralgia, neuritis, and radiculitis, unspecified     Neuropathy of both

## 2024-01-30 ENCOUNTER — NURSE ONLY (OUTPATIENT)
Dept: UROLOGY | Age: 79
End: 2024-01-30

## 2024-01-30 LAB
BACTERIA SPEC CULT: NORMAL
SERVICE CMNT-IMP: NORMAL

## 2024-01-30 NOTE — PROGRESS NOTES
Pt came in for catheter removal. 14f chandler catheter removed without incident.  Pt instructed to push fluids, 6-8 glasses of water and if he has not voided on his own by 3:00pm to call us and come in this afternoon for reinsertion.

## 2024-02-19 RX ORDER — TAMSULOSIN HYDROCHLORIDE 0.4 MG/1
0.4 CAPSULE ORAL DAILY
Qty: 90 CAPSULE | Refills: 3 | OUTPATIENT
Start: 2024-02-19

## 2024-03-23 SDOH — HEALTH STABILITY: PHYSICAL HEALTH: ON AVERAGE, HOW MANY DAYS PER WEEK DO YOU ENGAGE IN MODERATE TO STRENUOUS EXERCISE (LIKE A BRISK WALK)?: 0 DAYS

## 2024-03-23 ASSESSMENT — LIFESTYLE VARIABLES
HOW MANY STANDARD DRINKS CONTAINING ALCOHOL DO YOU HAVE ON A TYPICAL DAY: 0
HOW OFTEN DO YOU HAVE SIX OR MORE DRINKS ON ONE OCCASION: 1
HOW MANY STANDARD DRINKS CONTAINING ALCOHOL DO YOU HAVE ON A TYPICAL DAY: PATIENT DOES NOT DRINK
HOW OFTEN DO YOU HAVE A DRINK CONTAINING ALCOHOL: 1
HOW OFTEN DO YOU HAVE A DRINK CONTAINING ALCOHOL: NEVER

## 2024-03-23 ASSESSMENT — PATIENT HEALTH QUESTIONNAIRE - PHQ9
SUM OF ALL RESPONSES TO PHQ QUESTIONS 1-9: 0
2. FEELING DOWN, DEPRESSED OR HOPELESS: NOT AT ALL
SUM OF ALL RESPONSES TO PHQ QUESTIONS 1-9: 0
SUM OF ALL RESPONSES TO PHQ9 QUESTIONS 1 & 2: 0
1. LITTLE INTEREST OR PLEASURE IN DOING THINGS: NOT AT ALL

## 2024-03-26 ENCOUNTER — OFFICE VISIT (OUTPATIENT)
Dept: FAMILY MEDICINE CLINIC | Facility: CLINIC | Age: 79
End: 2024-03-26
Payer: MEDICARE

## 2024-03-26 VITALS
SYSTOLIC BLOOD PRESSURE: 112 MMHG | HEIGHT: 71 IN | DIASTOLIC BLOOD PRESSURE: 64 MMHG | WEIGHT: 218.4 LBS | BODY MASS INDEX: 30.57 KG/M2

## 2024-03-26 DIAGNOSIS — R33.8 BENIGN PROSTATIC HYPERPLASIA WITH URINARY RETENTION: ICD-10-CM

## 2024-03-26 DIAGNOSIS — N18.32 CHRONIC KIDNEY DISEASE, STAGE 3B (HCC): ICD-10-CM

## 2024-03-26 DIAGNOSIS — I25.118 ATHEROSCLEROTIC HEART DISEASE OF NATIVE CORONARY ARTERY WITH OTHER FORMS OF ANGINA PECTORIS (HCC): ICD-10-CM

## 2024-03-26 DIAGNOSIS — N40.1 BENIGN PROSTATIC HYPERPLASIA WITH URINARY RETENTION: ICD-10-CM

## 2024-03-26 DIAGNOSIS — Z00.00 MEDICARE ANNUAL WELLNESS VISIT, SUBSEQUENT: Primary | ICD-10-CM

## 2024-03-26 DIAGNOSIS — E11.42 TYPE 2 DIABETES MELLITUS WITH DIABETIC POLYNEUROPATHY, WITHOUT LONG-TERM CURRENT USE OF INSULIN (HCC): ICD-10-CM

## 2024-03-26 DIAGNOSIS — R33.9 URINARY RETENTION: ICD-10-CM

## 2024-03-26 DIAGNOSIS — E78.00 PURE HYPERCHOLESTEROLEMIA: ICD-10-CM

## 2024-03-26 DIAGNOSIS — R26.81 UNSTEADINESS: ICD-10-CM

## 2024-03-26 DIAGNOSIS — I10 PRIMARY HYPERTENSION: ICD-10-CM

## 2024-03-26 DIAGNOSIS — E78.1 PURE HYPERGLYCERIDEMIA: ICD-10-CM

## 2024-03-26 DIAGNOSIS — J44.9 CHRONIC OBSTRUCTIVE PULMONARY DISEASE, UNSPECIFIED COPD TYPE (HCC): ICD-10-CM

## 2024-03-26 LAB
ALBUMIN SERPL-MCNC: 3.5 G/DL (ref 3.2–4.6)
ALBUMIN/GLOB SERPL: 1 (ref 0.4–1.6)
ALP SERPL-CCNC: 148 U/L (ref 50–136)
ALT SERPL-CCNC: 20 U/L (ref 12–65)
ANION GAP SERPL CALC-SCNC: 4 MMOL/L (ref 2–11)
AST SERPL-CCNC: 14 U/L (ref 15–37)
BASOPHILS # BLD: 0 K/UL (ref 0–0.2)
BASOPHILS NFR BLD: 0 % (ref 0–2)
BILIRUB SERPL-MCNC: 0.6 MG/DL (ref 0.2–1.1)
BUN SERPL-MCNC: 41 MG/DL (ref 8–23)
CALCIUM SERPL-MCNC: 9.6 MG/DL (ref 8.3–10.4)
CHLORIDE SERPL-SCNC: 103 MMOL/L (ref 103–113)
CHOLEST SERPL-MCNC: 125 MG/DL
CO2 SERPL-SCNC: 30 MMOL/L (ref 21–32)
CREAT SERPL-MCNC: 2 MG/DL (ref 0.8–1.5)
DIFFERENTIAL METHOD BLD: ABNORMAL
EOSINOPHIL # BLD: 0.3 K/UL (ref 0–0.8)
EOSINOPHIL NFR BLD: 3 % (ref 0.5–7.8)
ERYTHROCYTE [DISTWIDTH] IN BLOOD BY AUTOMATED COUNT: 13.6 % (ref 11.9–14.6)
GLOBULIN SER CALC-MCNC: 3.4 G/DL (ref 2.8–4.5)
GLUCOSE SERPL-MCNC: 155 MG/DL (ref 65–100)
HCT VFR BLD AUTO: 47.7 % (ref 41.1–50.3)
HDLC SERPL-MCNC: 48 MG/DL (ref 40–60)
HDLC SERPL: 2.6
HGB BLD-MCNC: 14.9 G/DL (ref 13.6–17.2)
IMM GRANULOCYTES # BLD AUTO: 0 K/UL (ref 0–0.5)
IMM GRANULOCYTES NFR BLD AUTO: 0 % (ref 0–5)
LDLC SERPL CALC-MCNC: 55 MG/DL
LYMPHOCYTES # BLD: 2.6 K/UL (ref 0.5–4.6)
LYMPHOCYTES NFR BLD: 28 % (ref 13–44)
MCH RBC QN AUTO: 31.4 PG (ref 26.1–32.9)
MCHC RBC AUTO-ENTMCNC: 31.2 G/DL (ref 31.4–35)
MCV RBC AUTO: 100.6 FL (ref 82–102)
MICROALB/CREAT RATIO, POC: ABNORMAL
MICROALBUMIN URINE, POC: 20 MG/L
MONOCYTES # BLD: 0.9 K/UL (ref 0.1–1.3)
MONOCYTES NFR BLD: 10 % (ref 4–12)
NEUTS SEG # BLD: 5.5 K/UL (ref 1.7–8.2)
NEUTS SEG NFR BLD: 59 % (ref 43–78)
NRBC # BLD: 0 K/UL (ref 0–0.2)
PLATELET # BLD AUTO: 245 K/UL (ref 150–450)
PMV BLD AUTO: 11 FL (ref 9.4–12.3)
POTASSIUM SERPL-SCNC: 4.6 MMOL/L (ref 3.5–5.1)
PROT SERPL-MCNC: 6.9 G/DL (ref 6.3–8.2)
RBC # BLD AUTO: 4.74 M/UL (ref 4.23–5.6)
SODIUM SERPL-SCNC: 137 MMOL/L (ref 136–146)
TRIGL SERPL-MCNC: 110 MG/DL (ref 35–150)
TSH, 3RD GENERATION: 1.13 UIU/ML (ref 0.36–3.74)
VLDLC SERPL CALC-MCNC: 22 MG/DL (ref 6–23)
WBC # BLD AUTO: 9.3 K/UL (ref 4.3–11.1)

## 2024-03-26 PROCEDURE — G8417 CALC BMI ABV UP PARAM F/U: HCPCS | Performed by: FAMILY MEDICINE

## 2024-03-26 PROCEDURE — 99213 OFFICE O/P EST LOW 20 MIN: CPT | Performed by: FAMILY MEDICINE

## 2024-03-26 PROCEDURE — 3078F DIAST BP <80 MM HG: CPT | Performed by: FAMILY MEDICINE

## 2024-03-26 PROCEDURE — 3023F SPIROM DOC REV: CPT | Performed by: FAMILY MEDICINE

## 2024-03-26 PROCEDURE — G0439 PPPS, SUBSEQ VISIT: HCPCS | Performed by: FAMILY MEDICINE

## 2024-03-26 PROCEDURE — 3074F SYST BP LT 130 MM HG: CPT | Performed by: FAMILY MEDICINE

## 2024-03-26 PROCEDURE — 4004F PT TOBACCO SCREEN RCVD TLK: CPT | Performed by: FAMILY MEDICINE

## 2024-03-26 PROCEDURE — 82043 UR ALBUMIN QUANTITATIVE: CPT | Performed by: FAMILY MEDICINE

## 2024-03-26 PROCEDURE — G8428 CUR MEDS NOT DOCUMENT: HCPCS | Performed by: FAMILY MEDICINE

## 2024-03-26 PROCEDURE — 1123F ACP DISCUSS/DSCN MKR DOCD: CPT | Performed by: FAMILY MEDICINE

## 2024-03-26 PROCEDURE — G8484 FLU IMMUNIZE NO ADMIN: HCPCS | Performed by: FAMILY MEDICINE

## 2024-03-26 RX ORDER — TAMSULOSIN HYDROCHLORIDE 0.4 MG/1
0.4 CAPSULE ORAL DAILY
Qty: 30 CAPSULE | Refills: 5
Start: 2024-03-26

## 2024-03-26 NOTE — PROGRESS NOTES
Medicare Annual Wellness Visit    Humphrey Villalpando is here for Medicare AWV (Subsequent)    Assessment & Plan   Medicare annual wellness visit, subsequent  Recommendations for Preventive Services Due: see orders and patient instructions/AVS.  Recommended screening schedule for the next 5-10 years is provided to the patient in written form: see Patient Instructions/AVS.     No follow-ups on file.     Subjective   who has a past medical history significant for hypertension, high cholesterol, high triglycerides, diabetes with polyneuropathy, chronic kidney disease followed by nephrology, BPH with history of urinary retention followed by urology, CAD followed by cardiology and COPD.       Patient's complete Health Risk Assessment and screening values have been reviewed and are found in Flowsheets. The following problems were reviewed today and where indicated follow up appointments were made and/or referrals ordered.    Positive Risk Factor Screenings with Interventions:    Fall Risk:  Do you feel unsteady or are you worried about falling? : (!) yes  2 or more falls in past year?: (!) yes  Fall with injury in past year?: no     Interventions:    Reviewed medications, home hazards, visual acuity, and co-morbidities that can increase risk for falls             Activity, Diet, and Weight:  On average, how many days per week do you engage in moderate to strenuous exercise (like a brisk walk)?: 0 days       Do you eat balanced/healthy meals regularly?: Yes    Body mass index is 30.46 kg/m². (!) Abnormal    Obesity Interventions:  Patient declines any further evaluation or treatment                 Advanced Directives:  Do you have a Living Will?: (!) No    Intervention:  has NO advanced directive - information provided        Tobacco Use:  Tobacco Use: High Risk (3/26/2024)    Patient History     Smoking Tobacco Use: Every Day     Smokeless Tobacco Use: Never     Passive Exposure: Not on file     E-cigarette/Vaping

## 2024-03-26 NOTE — PROGRESS NOTES
SUBJECTIVE:   Humphrey Villalpando is a 78 y.o. 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   Patient reports ongoing struggles with unsteadiness.  He reports that he has had a fall in January after he felt very weak.  He states that he had taken some cold medicine and had difficulty urinating.  As a result he fell.  Patient went to the emergency room via EMS.  Patient was seen and evaluated with a Matos catheter placed.  He was discharged home with follow-up with urology.  He saw urology a week later and had catheter removed.  This was the second time he had a acute urinary retention episode.  The first was postoperatively after a hernia repair.  Patient has had no problems voiding since his catheter was removed by urology.  He states he is back on his Flomax.  He reports however it continued unsteadiness and subjective balance issues.  He reports no active chest pain, shortness of breath, orthopnea or PND.  GI and  review of systems as above.    HPI  See above    Past Medical History, Past Surgical History, Family history, Social History, and Medications were all reviewed with the patient today and updated as necessary.       Current Outpatient Medications   Medication Sig Dispense Refill    tamsulosin (FLOMAX) 0.4 MG capsule Take 1 capsule by mouth daily 30 capsule 5    atorvastatin (LIPITOR) 20 MG tablet TAKE ONE TABLET BY MOUTH ONE TIME DAILY 90 tablet 3    JANUVIA 50 MG tablet TAKE ONE TABLET BY MOUTH ONE TIME DAILY 90 tablet 3    pioglitazone (ACTOS) 30 MG tablet TAKE ONE TABLET BY MOUTH EVERY NIGHT AT BEDTIME 90 tablet 3    glipiZIDE (GLUCOTROL XL) 5 MG extended release tablet TAKE ONE TABLET BY MOUTH ONE TIME DAILY 90 tablet 3    DULoxetine (CYMBALTA) 60 MG extended release capsule Take

## 2024-03-27 LAB
EST. AVERAGE GLUCOSE BLD GHB EST-MCNC: 151 MG/DL
HBA1C MFR BLD: 6.9 % (ref 4.8–5.6)

## 2024-04-10 ENCOUNTER — HOSPITAL ENCOUNTER (OUTPATIENT)
Dept: PHYSICAL THERAPY | Age: 79
Setting detail: RECURRING SERIES
Discharge: HOME | End: 2024-04-13
Attending: FAMILY MEDICINE
Payer: MEDICARE

## 2024-04-10 DIAGNOSIS — R26.81 UNSTEADINESS ON FEET: ICD-10-CM

## 2024-04-10 DIAGNOSIS — M62.81 MUSCLE WEAKNESS (GENERALIZED): ICD-10-CM

## 2024-04-10 DIAGNOSIS — R26.89 BALANCE DISORDER: Primary | ICD-10-CM

## 2024-04-10 PROCEDURE — 97110 THERAPEUTIC EXERCISES: CPT

## 2024-04-10 PROCEDURE — 97161 PT EVAL LOW COMPLEX 20 MIN: CPT

## 2024-04-10 ASSESSMENT — PAIN SCALES - GENERAL: PAINLEVEL_OUTOF10: 8

## 2024-04-10 NOTE — THERAPY EVALUATION
polyps, Inflammatory and toxic neuropathy, unspecified, Kidney disease, Kidney stone, Neoplasm of uncertain behavior of skin, Neuralgia, neuritis, and radiculitis, unspecified, Neuropathy of both feet, Osteoarthritis, Other and unspecified hyperlipidemia, Paralysis of diaphragm, PONV (postoperative nausea and vomiting), Prolonged emergence from general anesthesia, Type II or unspecified type diabetes mellitus without mention of complication, uncontrolled, and Ulcer of ankle (HCC).  Mr. Villalpando  has a past surgical history that includes Colonoscopy (2007); Total shoulder arthroplasty; and hernia repair (Left, 7/12/2022).  Social History/Living Environment:   Patient lives with their family    Prior Level of Function/Work/Activity:   Employment Status: Retired     Learning:   Does the patient/guardian have any barriers to learning?: No barriers  Will there be a co-learner?: No  What is the preferred language of the patient/guardian?: English  Is an  required?: No  How does the patient/guardian prefer to learn new concepts?: Listening; Reading; Demonstration    Fall Risk Scale:   Marcial Total Score: 35          OBJECTIVE     Balance:       RIGHT LEFT   Rhomberg 30+ sec 30+ sec   Semi-Tandem 30 sec 30 sec   Tandem <5 sec <5 sec   Single Leg Stance Unable Unable             Functional Mobility:          Timed Up and Go 10.6 sec; Dual Task- 11.06 sec    Sit to stands 7 reps in 30 seconds    Step up Unable    Step down Unable      Abbreviations: DP- dysfunctional painful, DN- dysfunctional nonpainful, FN- functional non painful, FP- functional painful    ASSESSMENT   Initial Assessment:  Jer presents with chronic gait unsteadiness and balance disorder with history of falls which have progressed over the past 10 years. His chief complaint is decreased balance but also presents with decreased overall strength, mobility and activity tolerance which places him at risk of future falls. He will benefit from skilled

## 2024-04-10 NOTE — PROGRESS NOTES
Humphrey Jer Villalpando  : 1945  Primary: [facility] (Medicare Managed)  Secondary:  [facility] @ [place] [address] [place] [address]  Phone: [phone]  Fax: [phone] Plan Frequency: 1x per week for 60 days    Plan of Care/Certification Expiration Date: 24        Plan of Care/Certification Expiration Date:  Plan of Care/Certification Expiration Date: 24    Frequency/Duration:   Plan Frequency: 1x per week for 60 days      Time In/Out:   Time In: 1500  Time Out: 1600      PT Visit Info:         Visit Count:  1    OUTPATIENT PHYSICAL THERAPY:   Treatment Note 4/10/2024       Episode  (Balance disorder, falls)               Treatment Diagnosis:    Balance disorder  Unsteadiness on feet  Muscle weakness (generalized)  Medical/Referring Diagnosis:    Unsteadiness [R26.81]    Referring Physician:  David Mitchell MD MD Orders:  PT Eval and Treat   Return MD Appt:     Date of Onset:  Chronic  Allergies:   Codeine  Restrictions/Precautions:   Fall Precautions: Recurrent falls      Interventions Planned (Treatment may consist of any combination of the following):     See Assessment Note    Subjective Comments:   Jer reports he's fallen 3 times this year so far.   Initial Pain Level::     8/10  Post Session Pain Level:       8/10  Medications Last Reviewed:  4/10/2024  Updated Objective Findings:  See Evaluation Note from today  Treatment   THERAPEUTIC EXERCISE: (15 minutes):    Exercises per grid below to improve mobility, strength, and balance.  Required moderate visual, verbal, and manual cues to promote proper body alignment, promote proper body posture, promote proper body mechanics, and promote proper body breathing techniques.  Progressed resistance, range, repetitions, and complexity of movement as indicated.   Date:  4/10/2024   Activity/Exercise Parameters   Patient Education HEP Review   Ball foot rolls 2 min   Semi Tandem Balance 2 min

## 2024-04-19 ENCOUNTER — HOSPITAL ENCOUNTER (OUTPATIENT)
Dept: PHYSICAL THERAPY | Age: 79
Setting detail: RECURRING SERIES
Discharge: HOME | End: 2024-04-22
Attending: FAMILY MEDICINE
Payer: MEDICARE

## 2024-04-19 PROCEDURE — 97112 NEUROMUSCULAR REEDUCATION: CPT

## 2024-04-19 PROCEDURE — 97110 THERAPEUTIC EXERCISES: CPT

## 2024-04-19 ASSESSMENT — PAIN SCALES - GENERAL: PAINLEVEL_OUTOF10: 0

## 2024-04-19 NOTE — PROGRESS NOTES
Humphrey Jer Villalpando  : 1945  Primary: [facility] (Medicare Managed)  Secondary:  [facility] @ [place] [address] [place] [address]  Phone: [phone]  Fax: [phone] Plan Frequency: 1x per week for 60 days    Plan of Care/Certification Expiration Date: 24        Plan of Care/Certification Expiration Date:  Plan of Care/Certification Expiration Date: 24    Frequency/Duration:   Plan Frequency: 1x per week for 60 days      Time In/Out:   Time In: 1130  Time Out: 1230      PT Visit Info:         Visit Count:  2    OUTPATIENT PHYSICAL THERAPY:   Treatment Note 2024       Episode  (Balance disorder, falls)               Treatment Diagnosis:    No data found  Medical/Referring Diagnosis:    Unsteadiness [R26.81]    Referring Physician:  David Mitchell MD MD Orders:  PT Eval and Treat   Return MD Appt:     Date of Onset:  Chronic  Allergies:   Codeine  Restrictions/Precautions:   Fall Precautions: Recurrent falls      Interventions Planned (Treatment may consist of any combination of the following):     See Assessment Note    Subjective Comments:   Jer says he has been working on his balance at home this week.   Initial Pain Level::     0/10  Post Session Pain Level:       0/10  Medications Last Reviewed:  2024  Updated Objective Findings:  None Today  Treatment   THERAPEUTIC EXERCISE: (30 minutes):    Exercises per grid below to improve mobility, strength, and balance.  Required moderate visual, verbal, and manual cues to promote proper body alignment, promote proper body posture, promote proper body mechanics, and promote proper body breathing techniques.  Progressed resistance, range, repetitions, and complexity of movement as indicated.   Date:  2024   Activity/Exercise Parameters   Patient Education HEP Review   Ball foot rolls Review   Trunk rotation 2 min   SKTC 2 min   LAQ 2 x 20 each side   Standing hip

## 2024-04-24 ENCOUNTER — HOSPITAL ENCOUNTER (OUTPATIENT)
Dept: PHYSICAL THERAPY | Age: 79
Setting detail: RECURRING SERIES
Discharge: HOME | End: 2024-04-27
Attending: FAMILY MEDICINE
Payer: MEDICARE

## 2024-04-24 PROCEDURE — 97112 NEUROMUSCULAR REEDUCATION: CPT

## 2024-04-24 PROCEDURE — 97110 THERAPEUTIC EXERCISES: CPT

## 2024-04-24 ASSESSMENT — PAIN SCALES - GENERAL: PAINLEVEL_OUTOF10: 1

## 2024-04-24 NOTE — PROGRESS NOTES
Humphrey Jer Villalpando  : 1945  Primary: [facility] (Medicare Managed)  Secondary:  [facility] @ [place] [address] [place] [address]  Phone: [phone]  Fax: [phone] Plan Frequency: 1x per week for 60 days    Plan of Care/Certification Expiration Date: 24        Plan of Care/Certification Expiration Date:  Plan of Care/Certification Expiration Date: 24    Frequency/Duration:   Plan Frequency: 1x per week for 60 days      Time In/Out:   Time In: 1300  Time Out: 1400      PT Visit Info:         Visit Count:  3    OUTPATIENT PHYSICAL THERAPY:   Treatment Note 2024       Episode  (Balance disorder, falls)               Treatment Diagnosis:    No data found  Medical/Referring Diagnosis:    Unsteadiness [R26.81]    Referring Physician:  David Mitchell MD MD Orders:  PT Eval and Treat   Return MD Appt:     Date of Onset:  Chronic  Allergies:   Codeine  Restrictions/Precautions:   Fall Precautions: Recurrent falls      Interventions Planned (Treatment may consist of any combination of the following):     See Assessment Note    Subjective Comments:   Jer says his feet feel better today.   Initial Pain Level::     1/10  Post Session Pain Level:       0/10  Medications Last Reviewed:  2024  Updated Objective Findings:  None Today  Treatment   THERAPEUTIC EXERCISE: (30 minutes):    Exercises per grid below to improve mobility, strength, and balance.  Required moderate visual, verbal, and manual cues to promote proper body alignment, promote proper body posture, promote proper body mechanics, and promote proper body breathing techniques.  Progressed resistance, range, repetitions, and complexity of movement as indicated.   Date:  2024   Activity/Exercise Parameters   Patient Education HEP Review   Ball foot rolls Review   Trunk rotation 2 min   SKTC 2 min   LAQ 2 x 20 each side   Standing hip abduction/extension --   Sit to stand X10

## 2024-05-01 ENCOUNTER — HOSPITAL ENCOUNTER (OUTPATIENT)
Dept: PHYSICAL THERAPY | Age: 79
Setting detail: RECURRING SERIES
Discharge: HOME | End: 2024-05-04
Attending: FAMILY MEDICINE
Payer: MEDICARE

## 2024-05-01 PROCEDURE — 97140 MANUAL THERAPY 1/> REGIONS: CPT

## 2024-05-01 PROCEDURE — 97112 NEUROMUSCULAR REEDUCATION: CPT

## 2024-05-01 PROCEDURE — 97110 THERAPEUTIC EXERCISES: CPT

## 2024-05-01 ASSESSMENT — PAIN SCALES - GENERAL: PAINLEVEL_OUTOF10: 5

## 2024-05-01 NOTE — PROGRESS NOTES
Humphrey Jer Villalpando  : 1945  Primary: [facility] (Medicare Managed)  Secondary:  [facility] @ [place] [address] [place] [address]  Phone: [phone]  Fax: [phone] Plan Frequency: 1x per week for 60 days    Plan of Care/Certification Expiration Date: 24        Plan of Care/Certification Expiration Date:  Plan of Care/Certification Expiration Date: 24    Frequency/Duration:   Plan Frequency: 1x per week for 60 days      Time In/Out:   Time In: 1300  Time Out: 1345      PT Visit Info:         Visit Count:  4    OUTPATIENT PHYSICAL THERAPY:   Treatment Note 2024       Episode  (Balance disorder, falls)               Treatment Diagnosis:    No data found  Medical/Referring Diagnosis:    Unsteadiness [R26.81]    Referring Physician:  David Mitchell MD MD Orders:  PT Eval and Treat   Return MD Appt:     Date of Onset:  Chronic  Allergies:   Codeine  Restrictions/Precautions:   Fall Precautions: Recurrent falls      Interventions Planned (Treatment may consist of any combination of the following):     See Assessment Note    Subjective Comments:   Jer reports his \"back went out\" after getting off his lawnmower over the weekend but feels a little better today.   Initial Pain Level::     10  Post Session Pain Level:       2/10  Medications Last Reviewed:  2024  Updated Objective Findings:  None Today  Treatment   THERAPEUTIC EXERCISE: (15 minutes):    Exercises per grid below to improve mobility, strength, and balance.  Required moderate visual, verbal, and manual cues to promote proper body alignment, promote proper body posture, promote proper body mechanics, and promote proper body breathing techniques.  Progressed resistance, range, repetitions, and complexity of movement as indicated.   Date:  2024   Activity/Exercise Parameters   Patient Education HEP Review   Ball foot rolls Review   Trunk rotation 2 min   SKTC 2 min   LAQ 2

## 2024-05-07 RX ORDER — TAMSULOSIN HYDROCHLORIDE 0.4 MG/1
0.4 CAPSULE ORAL DAILY
Qty: 90 CAPSULE | Refills: 1 | Status: SHIPPED | OUTPATIENT
Start: 2024-05-07

## 2024-05-08 ENCOUNTER — HOSPITAL ENCOUNTER (OUTPATIENT)
Dept: PHYSICAL THERAPY | Age: 79
Setting detail: RECURRING SERIES
Discharge: HOME | End: 2024-05-11
Attending: FAMILY MEDICINE
Payer: MEDICARE

## 2024-05-08 PROCEDURE — 97112 NEUROMUSCULAR REEDUCATION: CPT

## 2024-05-08 PROCEDURE — 97110 THERAPEUTIC EXERCISES: CPT

## 2024-05-08 PROCEDURE — 97140 MANUAL THERAPY 1/> REGIONS: CPT

## 2024-05-08 ASSESSMENT — PAIN SCALES - GENERAL: PAINLEVEL_OUTOF10: 3

## 2024-05-08 NOTE — PROGRESS NOTES
Humphrey Jer Villalpando  : 1945  Primary: [facility] (Medicare Managed)  Secondary:  [facility] @ [place] [address] [place] [address]  Phone: [phone]  Fax: [phone] Plan Frequency: 1x per week for 60 days    Plan of Care/Certification Expiration Date: 24        Plan of Care/Certification Expiration Date:  Plan of Care/Certification Expiration Date: 24    Frequency/Duration:   Plan Frequency: 1x per week for 60 days      Time In/Out:   Time In: 1300  Time Out: 1400      PT Visit Info:         Visit Count:  5    OUTPATIENT PHYSICAL THERAPY:   Treatment Note 2024       Episode  (Balance disorder, falls)               Treatment Diagnosis:    No data found  Medical/Referring Diagnosis:    Unsteadiness [R26.81]    Referring Physician:  David Mitchell MD MD Orders:  PT Eval and Treat   Return MD Appt:     Date of Onset:  Chronic  Allergies:   Codeine  Restrictions/Precautions:   Fall Precautions: Recurrent falls      Interventions Planned (Treatment may consist of any combination of the following):     See Assessment Note    Subjective Comments:   Jer presents with newly purchased rollator today which he says has helped his walking a lot the past few days.   Initial Pain Level::     3/10  Post Session Pain Level:       2/10  Medications Last Reviewed:  2024  Updated Objective Findings:  None Today  Treatment   THERAPEUTIC EXERCISE: (25 minutes):    Exercises per grid below to improve mobility, strength, and balance.  Required moderate visual, verbal, and manual cues to promote proper body alignment, promote proper body posture, promote proper body mechanics, and promote proper body breathing techniques.  Progressed resistance, range, repetitions, and complexity of movement as indicated.   Date:  2024   Activity/Exercise Parameters   Patient Education HEP Review   Ball foot rolls Review   Trunk rotation 2 min   SKTC 2 min   LAQ 2 x

## 2024-05-09 SDOH — ECONOMIC STABILITY: INCOME INSECURITY: HOW HARD IS IT FOR YOU TO PAY FOR THE VERY BASICS LIKE FOOD, HOUSING, MEDICAL CARE, AND HEATING?: SOMEWHAT HARD

## 2024-05-09 SDOH — ECONOMIC STABILITY: FOOD INSECURITY: WITHIN THE PAST 12 MONTHS, YOU WORRIED THAT YOUR FOOD WOULD RUN OUT BEFORE YOU GOT MONEY TO BUY MORE.: SOMETIMES TRUE

## 2024-05-09 SDOH — ECONOMIC STABILITY: FOOD INSECURITY: WITHIN THE PAST 12 MONTHS, THE FOOD YOU BOUGHT JUST DIDN'T LAST AND YOU DIDN'T HAVE MONEY TO GET MORE.: PATIENT DECLINED

## 2024-05-10 ENCOUNTER — TELEMEDICINE (OUTPATIENT)
Dept: FAMILY MEDICINE CLINIC | Facility: CLINIC | Age: 79
End: 2024-05-10
Payer: MEDICARE

## 2024-05-10 DIAGNOSIS — N18.32 CHRONIC KIDNEY DISEASE, STAGE 3B (HCC): ICD-10-CM

## 2024-05-10 DIAGNOSIS — J44.9 CHRONIC OBSTRUCTIVE PULMONARY DISEASE, UNSPECIFIED COPD TYPE (HCC): ICD-10-CM

## 2024-05-10 DIAGNOSIS — E78.00 PURE HYPERCHOLESTEROLEMIA: ICD-10-CM

## 2024-05-10 DIAGNOSIS — E78.1 PURE HYPERGLYCERIDEMIA: ICD-10-CM

## 2024-05-10 DIAGNOSIS — I10 PRIMARY HYPERTENSION: ICD-10-CM

## 2024-05-10 DIAGNOSIS — E11.42 TYPE 2 DIABETES MELLITUS WITH DIABETIC POLYNEUROPATHY, WITHOUT LONG-TERM CURRENT USE OF INSULIN (HCC): Primary | ICD-10-CM

## 2024-05-10 DIAGNOSIS — I25.118 ATHEROSCLEROTIC HEART DISEASE OF NATIVE CORONARY ARTERY WITH OTHER FORMS OF ANGINA PECTORIS (HCC): ICD-10-CM

## 2024-05-10 DIAGNOSIS — N40.0 BENIGN PROSTATIC HYPERPLASIA WITHOUT LOWER URINARY TRACT SYMPTOMS: ICD-10-CM

## 2024-05-10 PROCEDURE — 99443 PR PHYS/QHP TELEPHONE EVALUATION 21-30 MIN: CPT | Performed by: FAMILY MEDICINE

## 2024-05-10 ASSESSMENT — PATIENT HEALTH QUESTIONNAIRE - PHQ9
SUM OF ALL RESPONSES TO PHQ QUESTIONS 1-9: 0
SUM OF ALL RESPONSES TO PHQ9 QUESTIONS 1 & 2: 0
SUM OF ALL RESPONSES TO PHQ QUESTIONS 1-9: 0
2. FEELING DOWN, DEPRESSED OR HOPELESS: NOT AT ALL
1. LITTLE INTEREST OR PLEASURE IN DOING THINGS: NOT AT ALL
SUM OF ALL RESPONSES TO PHQ QUESTIONS 1-9: 0
SUM OF ALL RESPONSES TO PHQ QUESTIONS 1-9: 0

## 2024-05-10 NOTE — PROGRESS NOTES
SUBJECTIVE:   Humphrey Villalpando is a 78 y.o. 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  Patient presents today for virtual visit via telephone encounter. Review of systems reveals no complaints of chest pain, shortness of breath, orthopnea or PND.  GI and  review of systems is unremarkable.  Current medications are listed in the EMR and reviewed today.      Patient's vital signs were not performed as encounter was performed virtually.  Location of virtual visit was patient's home.      HPI  See above    Past Medical History, Past Surgical History, Family history, Social History, and Medications were all reviewed with the patient today and updated as necessary.       Current Outpatient Medications   Medication Sig Dispense Refill    tamsulosin (FLOMAX) 0.4 MG capsule TAKE ONE CAPSULE BY MOUTH ONE TIME DAILY 90 capsule 1    atorvastatin (LIPITOR) 20 MG tablet TAKE ONE TABLET BY MOUTH ONE TIME DAILY 90 tablet 3    JANUVIA 50 MG tablet TAKE ONE TABLET BY MOUTH ONE TIME DAILY 90 tablet 3    pioglitazone (ACTOS) 30 MG tablet TAKE ONE TABLET BY MOUTH EVERY NIGHT AT BEDTIME 90 tablet 3    glipiZIDE (GLUCOTROL XL) 5 MG extended release tablet TAKE ONE TABLET BY MOUTH ONE TIME DAILY 90 tablet 3    DULoxetine (CYMBALTA) 60 MG extended release capsule Take 1 capsule by mouth 2 times daily 180 capsule 3    gabapentin (NEURONTIN) 600 MG tablet TAKE ONE TABLET BY MOUTH THREE TIMES A  tablet 3    dilTIAZem (TIAZAC) 120 MG extended release capsule Take 1 capsule by mouth every morning 90 capsule 3    nystatin-triamcinolone (MYCOLOG II) 324878-1.1 UNIT/GM-% cream Apply topically 2 times daily. 15 g 0    budesonide-formoterol (SYMBICORT) 160-4.5 MCG/ACT AERO Inhale 2 puffs into the lungs as needed

## 2024-05-12 NOTE — PROGRESS NOTES
[address], [address]  [place] [address]  PHONE: [phone]        NAME:  Humphrey Villalpando  : 1945  MRN: [id number]     PCP:  David Mitchell MD      SUBJECTIVE:   Humphrey Villalpando is a 78 y.o. male seen for a follow up visit regarding the following:     Chief Complaint   Patient presents with    Coronary Artery Disease       HPI:     Previously followed by [facility].  Known CAD s/p cath  at [facility] showing mild non-obstructive CAD. He has chronic SOB and MONOEY with COPD but started smoking again in the past few years, still smoking <10 cigs per day, but will continue to try to decrease usage (started at 13yo).    Doing well recently without interval angina, CHF, palpitations, edema.  Carotids showed less than 50% bilateral internal carotid disease with a left vertebral stenosis. CT of the brain was negative.  Echo as noted below.  Previous postural hypotension resolved now, hydrating as tolerated.  He is encouraged to drink at least 6 to 8 glasses of fluid daily and to minimize alcohol and caffeine use.        Echo 10/2021:     Left Ventricle  Normal cavity size, wall thickness and systolic function (ejection fraction normal). Wall motion: normal. The estimated EF is 60  - 65%. There is mild (grade 1) left ventricular diastolic dysfunction E/e'=10.     Left Atrium  Normal cavity size. Left Atrium volume index is 21 mL/m2.     Interatrial Septum  No interatrial shunt visualized on color doppler.     Right Ventricle  Normal cavity size and global systolic function.     Right Atrium  Normal cavity size.     Aortic Valve  Trileaflet valve structure, no stenosis and no regurgitation. Mild aortic valve sclerosis. Normal aortic leaflet mobility.     Mitral Valve  Normal valve structure, no stenosis and no regurgitation.     Tricuspid Valve  Normal valve structure, no stenosis and no regurgitation.     Pulmonic Valve  Pulmonic valve not well

## 2024-05-13 RX ORDER — TAMSULOSIN HYDROCHLORIDE 0.4 MG/1
0.4 CAPSULE ORAL DAILY
Qty: 90 CAPSULE | Refills: 3 | OUTPATIENT
Start: 2024-05-13

## 2024-05-16 ENCOUNTER — OFFICE VISIT (OUTPATIENT)
Age: 79
End: 2024-05-16
Payer: MEDICARE

## 2024-05-16 VITALS
DIASTOLIC BLOOD PRESSURE: 76 MMHG | BODY MASS INDEX: 31.64 KG/M2 | WEIGHT: 226 LBS | SYSTOLIC BLOOD PRESSURE: 128 MMHG | HEART RATE: 92 BPM | HEIGHT: 71 IN

## 2024-05-16 DIAGNOSIS — E11.42 TYPE 2 DIABETES MELLITUS WITH DIABETIC POLYNEUROPATHY, WITHOUT LONG-TERM CURRENT USE OF INSULIN (HCC): ICD-10-CM

## 2024-05-16 DIAGNOSIS — Z72.0 TOBACCO ABUSE: ICD-10-CM

## 2024-05-16 DIAGNOSIS — I10 ESSENTIAL HYPERTENSION, BENIGN: ICD-10-CM

## 2024-05-16 DIAGNOSIS — R00.0 SINUS TACHYCARDIA: ICD-10-CM

## 2024-05-16 DIAGNOSIS — J44.9 CHRONIC OBSTRUCTIVE PULMONARY DISEASE, UNSPECIFIED COPD TYPE (HCC): ICD-10-CM

## 2024-05-16 DIAGNOSIS — I25.118 ATHEROSCLEROTIC HEART DISEASE OF NATIVE CORONARY ARTERY WITH OTHER FORMS OF ANGINA PECTORIS (HCC): Primary | ICD-10-CM

## 2024-05-16 DIAGNOSIS — E78.5 DYSLIPIDEMIA: ICD-10-CM

## 2024-05-16 PROCEDURE — 3023F SPIROM DOC REV: CPT | Performed by: INTERNAL MEDICINE

## 2024-05-16 PROCEDURE — 99214 OFFICE O/P EST MOD 30 MIN: CPT | Performed by: INTERNAL MEDICINE

## 2024-05-16 PROCEDURE — 3074F SYST BP LT 130 MM HG: CPT | Performed by: INTERNAL MEDICINE

## 2024-05-16 PROCEDURE — 1123F ACP DISCUSS/DSCN MKR DOCD: CPT | Performed by: INTERNAL MEDICINE

## 2024-05-16 PROCEDURE — G8417 CALC BMI ABV UP PARAM F/U: HCPCS | Performed by: INTERNAL MEDICINE

## 2024-05-16 PROCEDURE — 4004F PT TOBACCO SCREEN RCVD TLK: CPT | Performed by: INTERNAL MEDICINE

## 2024-05-16 PROCEDURE — G8427 DOCREV CUR MEDS BY ELIG CLIN: HCPCS | Performed by: INTERNAL MEDICINE

## 2024-05-16 PROCEDURE — 3078F DIAST BP <80 MM HG: CPT | Performed by: INTERNAL MEDICINE

## 2024-05-16 PROCEDURE — 3044F HG A1C LEVEL LT 7.0%: CPT | Performed by: INTERNAL MEDICINE

## 2024-05-16 PROCEDURE — 93000 ELECTROCARDIOGRAM COMPLETE: CPT | Performed by: INTERNAL MEDICINE

## 2024-05-29 ENCOUNTER — HOSPITAL ENCOUNTER (OUTPATIENT)
Dept: PHYSICAL THERAPY | Age: 79
Setting detail: RECURRING SERIES
Discharge: HOME | End: 2024-06-01
Attending: FAMILY MEDICINE
Payer: MEDICARE

## 2024-05-29 PROCEDURE — 97112 NEUROMUSCULAR REEDUCATION: CPT

## 2024-05-29 PROCEDURE — 97110 THERAPEUTIC EXERCISES: CPT

## 2024-05-29 ASSESSMENT — PAIN SCALES - GENERAL: PAINLEVEL_OUTOF10: 2

## 2024-05-29 NOTE — PROGRESS NOTES
Humphrey Jer Villalpando  : 1945  Primary: [facility] (Medicare Managed)  Secondary:  [facility] @ [place] [address] [place] [address]  Phone: [phone]  Fax: [phone] Plan Frequency: 1x per week for 60 days    Plan of Care/Certification Expiration Date: 24        Plan of Care/Certification Expiration Date:  Plan of Care/Certification Expiration Date: 24    Frequency/Duration:   Plan Frequency: 1x per week for 60 days      Time In/Out:   Time In: 1400  Time Out: 1445      PT Visit Info:    Progress Note Counter: 6      Visit Count:  6    OUTPATIENT PHYSICAL THERAPY:   Treatment Note 2024       Episode  (Balance disorder, falls)               Treatment Diagnosis:    No data found  Medical/Referring Diagnosis:    Unsteadiness [R26.81]    Referring Physician:  David Mitchell MD MD Orders:  PT Eval and Treat   Return MD Appt:     Date of Onset:  Chronic  Allergies:   Codeine  Restrictions/Precautions:   Fall Precautions: Recurrent falls      Interventions Planned (Treatment may consist of any combination of the following):     See Assessment Note    Subjective Comments:   Jer says he's been walking better the past week.   Initial Pain Level::     2/10  Post Session Pain Level:       1/10  Medications Last Reviewed:  2024  Updated Objective Findings:  None Today  Treatment   THERAPEUTIC EXERCISE: (25 minutes):    Exercises per grid below to improve mobility, strength, and balance.  Required moderate visual, verbal, and manual cues to promote proper body alignment, promote proper body posture, promote proper body mechanics, and promote proper body breathing techniques.  Progressed resistance, range, repetitions, and complexity of movement as indicated.   Date:  2024   Activity/Exercise Parameters   Patient Education HEP Review   Ball foot rolls Review   Trunk rotation 2 min   SKTC 2 min   LAQ 2 x 20 each side   Stair taps --   Sit

## 2024-07-30 ENCOUNTER — CLINICAL DOCUMENTATION (OUTPATIENT)
Dept: PHYSICAL THERAPY | Age: 79
End: 2024-07-30

## 2024-07-30 NOTE — THERAPY DISCHARGE
[facility] @ [place]  [address]   [place] [address]  Phone: [phone]  Fax: [phone]    OUTPATIENT PHYSICAL THERAPY  Discontinuation Summary 7/30/2024  Episode  Appt Desk         Humphrey Villalpando has been seen in physical therapy for 6  visits from 4/10/24 to 5/29/24, with 0 cancellations and 0 no shows. Mr. Villalpando's therapy has come to an end at this time due to: Patient did not return for/schedule additional treatment    Physical Therapy Goals:  Not Met: Reasons for goals not being achieved: self discharge    JOSSELINE GAY, PT

## 2024-10-14 RX ORDER — DILTIAZEM HYDROCHLORIDE 120 MG/1
120 CAPSULE, EXTENDED RELEASE ORAL EVERY MORNING
Qty: 90 CAPSULE | Refills: 3 | Status: SHIPPED | OUTPATIENT
Start: 2024-10-14

## 2024-10-14 NOTE — TELEPHONE ENCOUNTER
MEDICATION REFILL REQUEST      Name of Medication:  Tiadylter   Dose:  120 mg  Frequency:  daily   Quantity:    Days' supply:        Pharmacy Name/Location:  did not leave

## 2024-10-14 NOTE — TELEPHONE ENCOUNTER
Requested Prescriptions     Pending Prescriptions Disp Refills    dilTIAZem (TIAZAC) 120 MG extended release capsule 90 capsule 3     Sig: Take 1 capsule by mouth every morning     Verified rx. Last OV 05/16/24. Erx to pharm on file.

## 2024-10-31 RX ORDER — TAMSULOSIN HYDROCHLORIDE 0.4 MG/1
0.4 CAPSULE ORAL DAILY
Qty: 90 CAPSULE | Refills: 1 | Status: SHIPPED | OUTPATIENT
Start: 2024-10-31

## 2024-11-03 DIAGNOSIS — E11.42 TYPE 2 DIABETES MELLITUS WITH DIABETIC POLYNEUROPATHY, WITHOUT LONG-TERM CURRENT USE OF INSULIN (HCC): Primary | ICD-10-CM

## 2024-11-04 RX ORDER — GABAPENTIN 600 MG/1
TABLET ORAL
Qty: 270 TABLET | Refills: 1 | Status: SHIPPED | OUTPATIENT
Start: 2024-11-04 | End: 2025-02-02

## 2024-11-11 RX ORDER — TAMSULOSIN HYDROCHLORIDE 0.4 MG/1
0.4 CAPSULE ORAL DAILY
Qty: 90 CAPSULE | Refills: 1 | OUTPATIENT
Start: 2024-11-11

## 2024-11-13 ENCOUNTER — LAB (OUTPATIENT)
Dept: FAMILY MEDICINE CLINIC | Facility: CLINIC | Age: 79
End: 2024-11-13

## 2024-11-13 DIAGNOSIS — E11.42 TYPE 2 DIABETES MELLITUS WITH DIABETIC POLYNEUROPATHY, WITHOUT LONG-TERM CURRENT USE OF INSULIN (HCC): ICD-10-CM

## 2024-11-13 DIAGNOSIS — E78.00 PURE HYPERCHOLESTEROLEMIA: ICD-10-CM

## 2024-11-13 DIAGNOSIS — N18.32 CHRONIC KIDNEY DISEASE, STAGE 3B (HCC): ICD-10-CM

## 2024-11-13 LAB
ALBUMIN SERPL-MCNC: 3.6 G/DL (ref 3.2–4.6)
ALBUMIN/GLOB SERPL: 1.2 (ref 1–1.9)
ALP SERPL-CCNC: 115 U/L (ref 40–129)
ALT SERPL-CCNC: 17 U/L (ref 8–55)
ANION GAP SERPL CALC-SCNC: 11 MMOL/L (ref 7–16)
AST SERPL-CCNC: 26 U/L (ref 15–37)
BILIRUB SERPL-MCNC: 0.3 MG/DL (ref 0–1.2)
BUN SERPL-MCNC: 33 MG/DL (ref 8–23)
CALCIUM SERPL-MCNC: 9.5 MG/DL (ref 8.8–10.2)
CHLORIDE SERPL-SCNC: 104 MMOL/L (ref 98–107)
CHOLEST SERPL-MCNC: 125 MG/DL (ref 0–200)
CO2 SERPL-SCNC: 26 MMOL/L (ref 20–29)
CREAT SERPL-MCNC: 1.94 MG/DL (ref 0.8–1.3)
EST. AVERAGE GLUCOSE BLD GHB EST-MCNC: 163 MG/DL
GLOBULIN SER CALC-MCNC: 2.9 G/DL (ref 2.3–3.5)
GLUCOSE SERPL-MCNC: 136 MG/DL (ref 70–99)
HBA1C MFR BLD: 7.3 % (ref 0–5.6)
HDLC SERPL-MCNC: 46 MG/DL (ref 40–60)
HDLC SERPL: 2.7 (ref 0–5)
LDLC SERPL CALC-MCNC: 54 MG/DL (ref 0–100)
POTASSIUM SERPL-SCNC: 5.2 MMOL/L (ref 3.5–5.1)
PROT SERPL-MCNC: 6.5 G/DL (ref 6.3–8.2)
SODIUM SERPL-SCNC: 141 MMOL/L (ref 136–145)
TRIGL SERPL-MCNC: 126 MG/DL (ref 0–150)
VLDLC SERPL CALC-MCNC: 25 MG/DL (ref 6–23)

## 2024-11-18 ENCOUNTER — OFFICE VISIT (OUTPATIENT)
Dept: FAMILY MEDICINE CLINIC | Facility: CLINIC | Age: 79
End: 2024-11-18

## 2024-11-18 VITALS
BODY MASS INDEX: 33.04 KG/M2 | DIASTOLIC BLOOD PRESSURE: 68 MMHG | HEART RATE: 96 BPM | WEIGHT: 236 LBS | SYSTOLIC BLOOD PRESSURE: 126 MMHG | OXYGEN SATURATION: 94 % | HEIGHT: 71 IN

## 2024-11-18 DIAGNOSIS — E78.00 PURE HYPERCHOLESTEROLEMIA: ICD-10-CM

## 2024-11-18 DIAGNOSIS — N40.0 BENIGN PROSTATIC HYPERPLASIA WITHOUT LOWER URINARY TRACT SYMPTOMS: ICD-10-CM

## 2024-11-18 DIAGNOSIS — E87.5 HYPERKALEMIA: ICD-10-CM

## 2024-11-18 DIAGNOSIS — E11.42 TYPE 2 DIABETES MELLITUS WITH DIABETIC POLYNEUROPATHY, WITHOUT LONG-TERM CURRENT USE OF INSULIN (HCC): Primary | ICD-10-CM

## 2024-11-18 DIAGNOSIS — E78.1 PURE HYPERGLYCERIDEMIA: ICD-10-CM

## 2024-11-18 DIAGNOSIS — J44.9 CHRONIC OBSTRUCTIVE PULMONARY DISEASE, UNSPECIFIED COPD TYPE (HCC): ICD-10-CM

## 2024-11-18 DIAGNOSIS — I25.10 CORONARY ARTERY DISEASE INVOLVING NATIVE CORONARY ARTERY OF NATIVE HEART WITHOUT ANGINA PECTORIS: ICD-10-CM

## 2024-11-18 DIAGNOSIS — N18.32 CHRONIC KIDNEY DISEASE, STAGE 3B (HCC): ICD-10-CM

## 2024-11-18 DIAGNOSIS — I10 PRIMARY HYPERTENSION: ICD-10-CM

## 2024-11-18 RX ORDER — TAMSULOSIN HYDROCHLORIDE 0.4 MG/1
0.4 CAPSULE ORAL DAILY
Qty: 90 CAPSULE | Refills: 1 | Status: SHIPPED | OUTPATIENT
Start: 2024-11-18

## 2024-11-18 RX ORDER — BUDESONIDE AND FORMOTEROL FUMARATE DIHYDRATE 160; 4.5 UG/1; UG/1
2 AEROSOL RESPIRATORY (INHALATION) AS NEEDED
Qty: 3 G | Refills: 3 | Status: SHIPPED | OUTPATIENT
Start: 2024-11-18

## 2024-11-18 ASSESSMENT — PATIENT HEALTH QUESTIONNAIRE - PHQ9
SUM OF ALL RESPONSES TO PHQ QUESTIONS 1-9: 0
SUM OF ALL RESPONSES TO PHQ9 QUESTIONS 1 & 2: 0
SUM OF ALL RESPONSES TO PHQ QUESTIONS 1-9: 0
2. FEELING DOWN, DEPRESSED OR HOPELESS: NOT AT ALL
SUM OF ALL RESPONSES TO PHQ QUESTIONS 1-9: 0
1. LITTLE INTEREST OR PLEASURE IN DOING THINGS: NOT AT ALL
SUM OF ALL RESPONSES TO PHQ QUESTIONS 1-9: 0

## 2024-11-18 NOTE — PROGRESS NOTES
SUBJECTIVE:   Humphrey Villalpando is a 79 y.o. male  who has a past medical history significant for hypertension, high cholesterol, high triglycerides, diabetes with polyneuropathy, BPH with urinary retention followed by urology, chronic kidney disease followed by nephrology, CAD followed by cardiology and COPD.  Review of systems reveals no complaints of chest pain, shortness of breath, orthopnea or PND.  GI and  review of systems is unremarkable.  Patient acknowledges that he has been noncompliant with diet and exercise.  He states that he has been \"eating what ever he wants\".  In addition he is requesting a form filled out for diabetic shoes.  Current medicines listed in the EMR and reviewed today.    HPI  See above    Past Medical History, Past Surgical History, Family history, Social History, and Medications were all reviewed with the patient today and updated as necessary.       Current Outpatient Medications   Medication Sig Dispense Refill    budesonide-formoterol (SYMBICORT) 160-4.5 MCG/ACT AERO Inhale 2 puffs into the lungs as needed (wheezing) 3 g 3    gabapentin (NEURONTIN) 600 MG tablet TAKE ONE TABLET BY MOUTH THREE TIMES A  tablet 1    tamsulosin (FLOMAX) 0.4 MG capsule TAKE ONE CAPSULE BY MOUTH ONE TIME DAILY 90 capsule 1    dilTIAZem (TIAZAC) 120 MG extended release capsule Take 1 capsule by mouth every morning 90 capsule 3    atorvastatin (LIPITOR) 20 MG tablet TAKE ONE TABLET BY MOUTH ONE TIME DAILY 90 tablet 3    JANUVIA 50 MG tablet TAKE ONE TABLET BY MOUTH ONE TIME DAILY 90 tablet 3    pioglitazone (ACTOS) 30 MG tablet TAKE ONE TABLET BY MOUTH EVERY NIGHT AT BEDTIME 90 tablet 3    glipiZIDE (GLUCOTROL XL) 5 MG extended release tablet TAKE ONE TABLET BY MOUTH ONE TIME DAILY 90 tablet 3    DULoxetine (CYMBALTA) 60 MG extended release capsule Take 1 capsule by mouth 2 times daily 180 capsule 3    nystatin-triamcinolone (MYCOLOG II) 567973-5.1 UNIT/GM-% cream Apply topically 2 times

## 2024-11-20 RX ORDER — DULOXETIN HYDROCHLORIDE 60 MG/1
60 CAPSULE, DELAYED RELEASE ORAL 2 TIMES DAILY
Qty: 180 CAPSULE | Refills: 3 | Status: SHIPPED | OUTPATIENT
Start: 2024-11-20

## 2024-12-01 NOTE — PROGRESS NOTES
regular rhythm.      Heart sounds: No murmur heard.     No friction rub. No gallop.   Pulmonary:      Effort: Pulmonary effort is normal.      Breath sounds: Normal breath sounds. No wheezing or rhonchi.      Comments: Decreased bibasilar with right base soft rhonchi, no wheezing  Abdominal:      General: Abdomen is flat. Bowel sounds are normal. There is no distension.      Palpations: Abdomen is soft.      Tenderness: There is no abdominal tenderness.   Musculoskeletal:         General: No swelling. Normal range of motion.      Cervical back: Normal range of motion and neck supple. No tenderness.   Skin:     General: Skin is warm and dry.   Neurological:      General: No focal deficit present.      Mental Status: He is alert and oriented to person, place, and time. Mental status is at baseline.   Psychiatric:         Mood and Affect: Mood normal.         Behavior: Behavior normal.          Medical problems and test results were reviewed with the patient today.     Lab Results   Component Value Date    LDL 54 11/13/2024       Lab Results   Component Value Date    CHOL 125 11/13/2024    CHOL 125 03/26/2024    CHOL 138 12/07/2023     Lab Results   Component Value Date    TRIG 126 11/13/2024    TRIG 110 03/26/2024    TRIG 103 12/07/2023     Lab Results   Component Value Date    HDL 46 11/13/2024    HDL 48 03/26/2024    HDL 45 12/07/2023     No components found for: \"LDLCHOLESTEROL\", \"LDLCALC\"    Lab Results   Component Value Date    VLDL 25 (H) 11/13/2024    VLDL 22 03/26/2024    VLDL 20.6 12/07/2023     Lab Results   Component Value Date    CHOLHDLRATIO 2.7 11/13/2024    CHOLHDLRATIO 2.6 03/26/2024    CHOLHDLRATIO 3.1 12/07/2023        Lab Results   Component Value Date/Time     11/13/2024 10:55 AM    K 5.2 11/13/2024 10:55 AM     11/13/2024 10:55 AM    CO2 26 11/13/2024 10:55 AM    BUN 33 11/13/2024 10:55 AM    CREATININE 1.94 11/13/2024 10:55 AM    GLUCOSE 136 11/13/2024 10:55 AM    CALCIUM 9.5

## 2024-12-02 ENCOUNTER — OFFICE VISIT (OUTPATIENT)
Age: 79
End: 2024-12-02
Payer: MEDICARE

## 2024-12-02 VITALS
DIASTOLIC BLOOD PRESSURE: 70 MMHG | HEART RATE: 76 BPM | WEIGHT: 232 LBS | SYSTOLIC BLOOD PRESSURE: 124 MMHG | BODY MASS INDEX: 32.48 KG/M2 | HEIGHT: 71 IN

## 2024-12-02 DIAGNOSIS — Z72.0 TOBACCO ABUSE: ICD-10-CM

## 2024-12-02 DIAGNOSIS — I10 ESSENTIAL HYPERTENSION, BENIGN: ICD-10-CM

## 2024-12-02 DIAGNOSIS — E78.5 DYSLIPIDEMIA: ICD-10-CM

## 2024-12-02 DIAGNOSIS — J44.9 CHRONIC OBSTRUCTIVE PULMONARY DISEASE, UNSPECIFIED COPD TYPE (HCC): ICD-10-CM

## 2024-12-02 DIAGNOSIS — R00.0 SINUS TACHYCARDIA: ICD-10-CM

## 2024-12-02 DIAGNOSIS — I25.118 ATHEROSCLEROTIC HEART DISEASE OF NATIVE CORONARY ARTERY WITH OTHER FORMS OF ANGINA PECTORIS (HCC): Primary | ICD-10-CM

## 2024-12-02 DIAGNOSIS — E11.42 TYPE 2 DIABETES MELLITUS WITH DIABETIC POLYNEUROPATHY, WITHOUT LONG-TERM CURRENT USE OF INSULIN (HCC): ICD-10-CM

## 2024-12-02 DIAGNOSIS — N18.32 CHRONIC KIDNEY DISEASE, STAGE 3B (HCC): ICD-10-CM

## 2024-12-02 PROCEDURE — G8484 FLU IMMUNIZE NO ADMIN: HCPCS | Performed by: INTERNAL MEDICINE

## 2024-12-02 PROCEDURE — 3074F SYST BP LT 130 MM HG: CPT | Performed by: INTERNAL MEDICINE

## 2024-12-02 PROCEDURE — 1159F MED LIST DOCD IN RCRD: CPT | Performed by: INTERNAL MEDICINE

## 2024-12-02 PROCEDURE — 4004F PT TOBACCO SCREEN RCVD TLK: CPT | Performed by: INTERNAL MEDICINE

## 2024-12-02 PROCEDURE — 99214 OFFICE O/P EST MOD 30 MIN: CPT | Performed by: INTERNAL MEDICINE

## 2024-12-02 PROCEDURE — 1126F AMNT PAIN NOTED NONE PRSNT: CPT | Performed by: INTERNAL MEDICINE

## 2024-12-02 PROCEDURE — G8427 DOCREV CUR MEDS BY ELIG CLIN: HCPCS | Performed by: INTERNAL MEDICINE

## 2024-12-02 PROCEDURE — G8417 CALC BMI ABV UP PARAM F/U: HCPCS | Performed by: INTERNAL MEDICINE

## 2024-12-02 PROCEDURE — 1123F ACP DISCUSS/DSCN MKR DOCD: CPT | Performed by: INTERNAL MEDICINE

## 2024-12-02 PROCEDURE — 3023F SPIROM DOC REV: CPT | Performed by: INTERNAL MEDICINE

## 2024-12-02 PROCEDURE — 3078F DIAST BP <80 MM HG: CPT | Performed by: INTERNAL MEDICINE

## 2024-12-02 PROCEDURE — 3051F HG A1C>EQUAL 7.0%<8.0%: CPT | Performed by: INTERNAL MEDICINE

## 2024-12-02 ASSESSMENT — ENCOUNTER SYMPTOMS
COUGH: 1
SHORTNESS OF BREATH: 1

## 2025-01-28 RX ORDER — GLIPIZIDE 5 MG/1
5 TABLET, FILM COATED, EXTENDED RELEASE ORAL DAILY
Qty: 90 TABLET | Refills: 3 | Status: SHIPPED | OUTPATIENT
Start: 2025-01-28

## 2025-01-28 RX ORDER — SITAGLIPTIN 50 MG/1
50 TABLET, FILM COATED ORAL DAILY
Qty: 90 TABLET | Refills: 3 | Status: SHIPPED | OUTPATIENT
Start: 2025-01-28

## 2025-01-28 RX ORDER — ATORVASTATIN CALCIUM 20 MG/1
TABLET, FILM COATED ORAL
Qty: 90 TABLET | Refills: 3 | Status: SHIPPED | OUTPATIENT
Start: 2025-01-28

## 2025-01-28 NOTE — TELEPHONE ENCOUNTER
Refill request     NOV 3/25/25     Preferred pharm: [id number] - [place], [place] - [address] - P [phone] - F [phone]

## 2025-01-30 ENCOUNTER — OFFICE VISIT (OUTPATIENT)
Dept: FAMILY MEDICINE CLINIC | Facility: CLINIC | Age: 80
End: 2025-01-30
Payer: MEDICARE

## 2025-01-30 VITALS
HEART RATE: 74 BPM | HEIGHT: 71 IN | TEMPERATURE: 98.2 F | WEIGHT: 239.8 LBS | OXYGEN SATURATION: 92 % | SYSTOLIC BLOOD PRESSURE: 122 MMHG | DIASTOLIC BLOOD PRESSURE: 68 MMHG | BODY MASS INDEX: 33.57 KG/M2

## 2025-01-30 DIAGNOSIS — N18.32 CHRONIC KIDNEY DISEASE, STAGE 3B (HCC): ICD-10-CM

## 2025-01-30 DIAGNOSIS — J20.8 ACUTE BACTERIAL BRONCHITIS: ICD-10-CM

## 2025-01-30 DIAGNOSIS — I10 PRIMARY HYPERTENSION: ICD-10-CM

## 2025-01-30 DIAGNOSIS — R05.1 ACUTE COUGH: Primary | ICD-10-CM

## 2025-01-30 DIAGNOSIS — B96.89 ACUTE BACTERIAL BRONCHITIS: ICD-10-CM

## 2025-01-30 DIAGNOSIS — J44.1 CHRONIC OBSTRUCTIVE PULMONARY DISEASE WITH ACUTE EXACERBATION (HCC): ICD-10-CM

## 2025-01-30 DIAGNOSIS — E78.00 PURE HYPERCHOLESTEROLEMIA: ICD-10-CM

## 2025-01-30 DIAGNOSIS — E11.42 TYPE 2 DIABETES MELLITUS WITH DIABETIC POLYNEUROPATHY, WITHOUT LONG-TERM CURRENT USE OF INSULIN (HCC): ICD-10-CM

## 2025-01-30 PROCEDURE — 1159F MED LIST DOCD IN RCRD: CPT | Performed by: FAMILY MEDICINE

## 2025-01-30 PROCEDURE — G8427 DOCREV CUR MEDS BY ELIG CLIN: HCPCS | Performed by: FAMILY MEDICINE

## 2025-01-30 PROCEDURE — 1123F ACP DISCUSS/DSCN MKR DOCD: CPT | Performed by: FAMILY MEDICINE

## 2025-01-30 PROCEDURE — 99214 OFFICE O/P EST MOD 30 MIN: CPT | Performed by: FAMILY MEDICINE

## 2025-01-30 PROCEDURE — G8417 CALC BMI ABV UP PARAM F/U: HCPCS | Performed by: FAMILY MEDICINE

## 2025-01-30 PROCEDURE — 3074F SYST BP LT 130 MM HG: CPT | Performed by: FAMILY MEDICINE

## 2025-01-30 PROCEDURE — 3023F SPIROM DOC REV: CPT | Performed by: FAMILY MEDICINE

## 2025-01-30 PROCEDURE — 3078F DIAST BP <80 MM HG: CPT | Performed by: FAMILY MEDICINE

## 2025-01-30 PROCEDURE — 4004F PT TOBACCO SCREEN RCVD TLK: CPT | Performed by: FAMILY MEDICINE

## 2025-01-30 RX ORDER — ALBUTEROL SULFATE 90 UG/1
2 INHALANT RESPIRATORY (INHALATION) EVERY 6 HOURS PRN
Qty: 1 EACH | Refills: 5 | Status: SHIPPED | OUTPATIENT
Start: 2025-01-30

## 2025-01-30 RX ORDER — AZITHROMYCIN 250 MG/1
TABLET, FILM COATED ORAL
Qty: 6 TABLET | Refills: 0 | Status: SHIPPED | OUTPATIENT
Start: 2025-01-30

## 2025-01-30 RX ORDER — TRIAMCINOLONE ACETONIDE 40 MG/ML
40 INJECTION, SUSPENSION INTRA-ARTICULAR; INTRAMUSCULAR ONCE
Status: COMPLETED | OUTPATIENT
Start: 2025-01-30 | End: 2025-01-30

## 2025-01-30 RX ORDER — ALBUTEROL SULFATE 0.83 MG/ML
2.5 SOLUTION RESPIRATORY (INHALATION) ONCE
Status: COMPLETED | OUTPATIENT
Start: 2025-01-30 | End: 2025-01-30

## 2025-01-30 RX ADMIN — TRIAMCINOLONE ACETONIDE 40 MG: 40 INJECTION, SUSPENSION INTRA-ARTICULAR; INTRAMUSCULAR at 12:10

## 2025-01-30 RX ADMIN — ALBUTEROL SULFATE 2.5 MG: 0.83 SOLUTION RESPIRATORY (INHALATION) at 12:12

## 2025-01-30 SDOH — ECONOMIC STABILITY: FOOD INSECURITY: WITHIN THE PAST 12 MONTHS, YOU WORRIED THAT YOUR FOOD WOULD RUN OUT BEFORE YOU GOT MONEY TO BUY MORE.: NEVER TRUE

## 2025-01-30 SDOH — ECONOMIC STABILITY: FOOD INSECURITY: WITHIN THE PAST 12 MONTHS, THE FOOD YOU BOUGHT JUST DIDN'T LAST AND YOU DIDN'T HAVE MONEY TO GET MORE.: NEVER TRUE

## 2025-01-30 ASSESSMENT — PATIENT HEALTH QUESTIONNAIRE - PHQ9
2. FEELING DOWN, DEPRESSED OR HOPELESS: NOT AT ALL
SUM OF ALL RESPONSES TO PHQ QUESTIONS 1-9: 0
SUM OF ALL RESPONSES TO PHQ QUESTIONS 1-9: 0
1. LITTLE INTEREST OR PLEASURE IN DOING THINGS: NOT AT ALL
SUM OF ALL RESPONSES TO PHQ QUESTIONS 1-9: 0
SUM OF ALL RESPONSES TO PHQ QUESTIONS 1-9: 0
SUM OF ALL RESPONSES TO PHQ9 QUESTIONS 1 & 2: 0

## 2025-01-30 NOTE — PROGRESS NOTES
SUBJECTIVE:   Humphrey Villalpando is a 79 y.o. 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  Patient presents today reporting for 4 to 5 days he has been experiencing wheezing, tightness in his chest and a mainly nonproductive cough.  Patient reports some shortness of breath associated with the symptoms.  No chest pain, orthopnea or PND.  No fever or bodyaches.  Home COVID and flu test were negative.    HPI  See above    Past Medical History, Past Surgical History, Family history, Social History, and Medications were all reviewed with the patient today and updated as necessary.       Current Outpatient Medications   Medication Sig Dispense Refill    azithromycin (ZITHROMAX) 250 MG tablet Take 2 Tablets with food by mouth first day, then 1 tab with food by mouth daily for days 2 through 5. 6 tablet 0    albuterol sulfate HFA (PROVENTIL HFA) 108 (90 Base) MCG/ACT inhaler Inhale 2 puffs into the lungs every 6 hours as needed for Wheezing or Shortness of Breath 1 each 5    atorvastatin (LIPITOR) 20 MG tablet TAKE ONE TABLET BY MOUTH ONE TIME DAILY 90 tablet 3    JANUVIA 50 MG tablet TAKE ONE TABLET BY MOUTH ONE TIME DAILY 90 tablet 3    glipiZIDE (GLUCOTROL XL) 5 MG extended release tablet TAKE ONE TABLET BY MOUTH ONE TIME DAILY 90 tablet 3    DULoxetine (CYMBALTA) 60 MG extended release capsule TAKE ONE CAPSULE BY MOUTH TWICE A  capsule 3    budesonide-formoterol (SYMBICORT) 160-4.5 MCG/ACT AERO Inhale 2 puffs into the lungs as needed (wheezing) 3 g 3    tamsulosin (FLOMAX) 0.4 MG capsule Take 1 capsule by mouth daily 90 capsule 1    gabapentin (NEURONTIN) 600 MG tablet TAKE ONE TABLET BY MOUTH THREE TIMES A  tablet 1    dilTIAZem (TIAZAC) 120 MG extended release capsule Take 1 capsule

## 2025-02-03 RX ORDER — ATORVASTATIN CALCIUM 20 MG/1
TABLET, FILM COATED ORAL
Qty: 90 TABLET | Refills: 3 | Status: SHIPPED | OUTPATIENT
Start: 2025-02-03

## 2025-02-03 RX ORDER — GLIPIZIDE 5 MG/1
5 TABLET, FILM COATED, EXTENDED RELEASE ORAL DAILY
Qty: 90 TABLET | Refills: 3 | Status: SHIPPED | OUTPATIENT
Start: 2025-02-03

## 2025-02-03 NOTE — TELEPHONE ENCOUNTER
Spoke with pharmacy regarding prescriptions sent on 1/28, and they didn't have a record of any prescriptions sent

## 2025-02-25 ENCOUNTER — OFFICE VISIT (OUTPATIENT)
Dept: FAMILY MEDICINE CLINIC | Facility: CLINIC | Age: 80
End: 2025-02-25
Payer: MEDICARE

## 2025-02-25 VITALS
WEIGHT: 241.6 LBS | BODY MASS INDEX: 33.82 KG/M2 | DIASTOLIC BLOOD PRESSURE: 62 MMHG | HEIGHT: 71 IN | HEART RATE: 79 BPM | OXYGEN SATURATION: 92 % | SYSTOLIC BLOOD PRESSURE: 116 MMHG

## 2025-02-25 DIAGNOSIS — I10 PRIMARY HYPERTENSION: ICD-10-CM

## 2025-02-25 DIAGNOSIS — R60.0 PERIPHERAL EDEMA: Primary | ICD-10-CM

## 2025-02-25 DIAGNOSIS — E11.9 TYPE 2 DIABETES MELLITUS WITHOUT COMPLICATION, WITHOUT LONG-TERM CURRENT USE OF INSULIN (HCC): ICD-10-CM

## 2025-02-25 DIAGNOSIS — E78.1 PURE HYPERGLYCERIDEMIA: ICD-10-CM

## 2025-02-25 DIAGNOSIS — I25.118 ATHEROSCLEROTIC HEART DISEASE OF NATIVE CORONARY ARTERY WITH OTHER FORMS OF ANGINA PECTORIS: ICD-10-CM

## 2025-02-25 DIAGNOSIS — J44.9 CHRONIC OBSTRUCTIVE PULMONARY DISEASE, UNSPECIFIED COPD TYPE (HCC): ICD-10-CM

## 2025-02-25 DIAGNOSIS — E78.00 PURE HYPERCHOLESTEROLEMIA: ICD-10-CM

## 2025-02-25 DIAGNOSIS — N18.32 CHRONIC KIDNEY DISEASE, STAGE 3B (HCC): ICD-10-CM

## 2025-02-25 PROCEDURE — 99214 OFFICE O/P EST MOD 30 MIN: CPT | Performed by: FAMILY MEDICINE

## 2025-02-25 PROCEDURE — G8417 CALC BMI ABV UP PARAM F/U: HCPCS | Performed by: FAMILY MEDICINE

## 2025-02-25 PROCEDURE — G8427 DOCREV CUR MEDS BY ELIG CLIN: HCPCS | Performed by: FAMILY MEDICINE

## 2025-02-25 PROCEDURE — 3023F SPIROM DOC REV: CPT | Performed by: FAMILY MEDICINE

## 2025-02-25 PROCEDURE — 3074F SYST BP LT 130 MM HG: CPT | Performed by: FAMILY MEDICINE

## 2025-02-25 PROCEDURE — 1159F MED LIST DOCD IN RCRD: CPT | Performed by: FAMILY MEDICINE

## 2025-02-25 PROCEDURE — 1123F ACP DISCUSS/DSCN MKR DOCD: CPT | Performed by: FAMILY MEDICINE

## 2025-02-25 PROCEDURE — 3078F DIAST BP <80 MM HG: CPT | Performed by: FAMILY MEDICINE

## 2025-02-25 PROCEDURE — G2211 COMPLEX E/M VISIT ADD ON: HCPCS | Performed by: FAMILY MEDICINE

## 2025-02-25 PROCEDURE — 4004F PT TOBACCO SCREEN RCVD TLK: CPT | Performed by: FAMILY MEDICINE

## 2025-02-25 RX ORDER — METOPROLOL SUCCINATE 50 MG/1
50 TABLET, EXTENDED RELEASE ORAL DAILY
Qty: 90 TABLET | Refills: 3 | Status: SHIPPED | OUTPATIENT
Start: 2025-02-25

## 2025-02-25 RX ORDER — GLIPIZIDE 10 MG/1
10 TABLET, FILM COATED, EXTENDED RELEASE ORAL DAILY
Qty: 90 TABLET | Refills: 3 | Status: SHIPPED | OUTPATIENT
Start: 2025-02-25

## 2025-02-25 ASSESSMENT — PATIENT HEALTH QUESTIONNAIRE - PHQ9
1. LITTLE INTEREST OR PLEASURE IN DOING THINGS: NOT AT ALL
SUM OF ALL RESPONSES TO PHQ QUESTIONS 1-9: 0
SUM OF ALL RESPONSES TO PHQ9 QUESTIONS 1 & 2: 0
2. FEELING DOWN, DEPRESSED OR HOPELESS: NOT AT ALL

## 2025-02-25 NOTE — PROGRESS NOTES
\"Have you been to the ER, urgent care clinic since your last visit?  Hospitalized since your last visit?\"    NO    “Have you seen or consulted any other health care providers outside our system since your last visit?”    NO            
morning 90 capsule 3    pioglitazone (ACTOS) 30 MG tablet TAKE ONE TABLET BY MOUTH EVERY NIGHT AT BEDTIME 90 tablet 3    docusate sodium (COLACE) 100 MG capsule Take 2 capsules by mouth at bedtime      Lancets MISC Please dispense lancets for patient to check FSBS four times a day       No current facility-administered medications for this visit.     Allergies   Allergen Reactions    Codeine Nausea And Vomiting     Patient Active Problem List   Diagnosis    Type 2 diabetes mellitus with diabetic polyneuropathy, without long-term current use of insulin ([facility])    Atherosclerotic heart disease of native coronary artery with other forms of angina pectoris    Elevated alkaline phosphatase level    Radiologic findings of lung field, abnormal    COPD (chronic obstructive pulmonary disease) ([facility])    Chronic kidney disease, stage 3b ([facility])    Tobacco abuse    Syncope and collapse    Sinus tachycardia    Basal cell carcinoma of scalp    Incarcerated left inguinal hernia    Essential hypertension, benign    Syncope    Hiatal hernia    Dyslipidemia    Renal cyst, left    Bilateral kidney stones    Olecranon bursitis of right elbow     Past Medical History:   Diagnosis Date    CAD (coronary artery disease)     non-obstructive, Dr Ramos-  denies hx of mi, never had heart cath    Chronic airway obstruction, not elsewhere classified 07/11/2022    symbicort inh- very seldom, does not require supplemental oxygen- smokes 0.75- ppd since age 13    Erectile dysfunction     Essential hypertension, benign 07/01/2015    H/O echocardiogram 10/15/2021    LVEF 60-65%    History of colonic polyps     Inflammatory and toxic neuropathy, unspecified 07/01/2015    Kidney disease     Kidney stone     Neoplasm of uncertain behavior of skin     Neuralgia, neuritis, and radiculitis, unspecified     Neuropathy of both feet     Osteoarthritis     Other and unspecified hyperlipidemia 07/01/2015    Paralysis of diaphragm 2002    Left side paralysis of

## 2025-03-03 NOTE — PROGRESS NOTES
Component Value Date    TRIG 126 11/13/2024    TRIG 110 03/26/2024    TRIG 103 12/07/2023     Lab Results   Component Value Date    HDL 46 11/13/2024    HDL 48 03/26/2024    HDL 45 12/07/2023     No components found for: \"LDLCHOLESTEROL\", \"LDLCALC\"  Lab Results   Component Value Date    VLDL 25 (H) 11/13/2024    VLDL 22 03/26/2024    VLDL 20.6 12/07/2023     Lab Results   Component Value Date    CHOLHDLRATIO 2.7 11/13/2024    CHOLHDLRATIO 2.6 03/26/2024    CHOLHDLRATIO 3.1 12/07/2023        Lab Results   Component Value Date/Time     11/13/2024 10:55 AM     03/26/2024 02:39 PM     01/25/2024 04:26 PM    K 5.2 11/13/2024 10:55 AM    K 4.6 03/26/2024 02:39 PM    K 4.7 01/25/2024 04:26 PM     11/13/2024 10:55 AM     03/26/2024 02:39 PM     01/25/2024 04:26 PM    CO2 26 11/13/2024 10:55 AM    CO2 30 03/26/2024 02:39 PM    CO2 23 01/25/2024 04:26 PM    BUN 33 11/13/2024 10:55 AM    BUN 41 03/26/2024 02:39 PM    BUN 28 01/25/2024 04:26 PM    CREATININE 1.94 11/13/2024 10:55 AM    CREATININE 2.00 03/26/2024 02:39 PM    CREATININE 1.86 01/25/2024 04:26 PM    GLUCOSE 136 11/13/2024 10:55 AM    GLUCOSE 155 03/26/2024 02:39 PM    GLUCOSE 138 01/25/2024 04:26 PM    CALCIUM 9.5 11/13/2024 10:55 AM    CALCIUM 9.6 03/26/2024 02:39 PM    CALCIUM 8.9 01/25/2024 04:26 PM         Lab Results   Component Value Date    ALT 17 11/13/2024    ALT 20 03/26/2024    ALT 6 (L) 01/25/2024    AST 26 11/13/2024    AST 14 (L) 03/26/2024    AST 25 01/25/2024        Assessment/Plan:   1. CAD in native artery  2. Carotid atherosclerosis, bilateral  - Continue with Lipitor  - Defer consideration of SAPT to primary cardiologist    3. Hypertension, unspecified type  - Well-controlled  - Currently on Toprol-XL    4. Hyperlipidemia, unspecified hyperlipidemia type  - Continue with Lipitor    5. Dyspnea, unspecified type   6. Bilateral lower extremity edema  - TTE in November 2024 noted a low normal EF  - Pertinent

## 2025-03-05 ENCOUNTER — OFFICE VISIT (OUTPATIENT)
Age: 80
End: 2025-03-05
Payer: MEDICARE

## 2025-03-05 ENCOUNTER — TELEPHONE (OUTPATIENT)
Dept: FAMILY MEDICINE CLINIC | Facility: CLINIC | Age: 80
End: 2025-03-05

## 2025-03-05 VITALS
DIASTOLIC BLOOD PRESSURE: 88 MMHG | WEIGHT: 229 LBS | HEIGHT: 70 IN | SYSTOLIC BLOOD PRESSURE: 130 MMHG | HEART RATE: 84 BPM | BODY MASS INDEX: 32.78 KG/M2

## 2025-03-05 DIAGNOSIS — I25.10 CAD IN NATIVE ARTERY: Primary | ICD-10-CM

## 2025-03-05 DIAGNOSIS — R06.00 DYSPNEA, UNSPECIFIED TYPE: ICD-10-CM

## 2025-03-05 DIAGNOSIS — R60.0 BILATERAL LOWER EXTREMITY EDEMA: ICD-10-CM

## 2025-03-05 DIAGNOSIS — N18.32 CKD STAGE 3B, GFR 30-44 ML/MIN (HCC): ICD-10-CM

## 2025-03-05 DIAGNOSIS — I10 HYPERTENSION, UNSPECIFIED TYPE: ICD-10-CM

## 2025-03-05 DIAGNOSIS — E78.5 HYPERLIPIDEMIA, UNSPECIFIED HYPERLIPIDEMIA TYPE: ICD-10-CM

## 2025-03-05 DIAGNOSIS — I65.23 CAROTID ATHEROSCLEROSIS, BILATERAL: ICD-10-CM

## 2025-03-05 DIAGNOSIS — L60.0 INGROWN TOENAIL: Primary | ICD-10-CM

## 2025-03-05 PROBLEM — R06.09 DOE (DYSPNEA ON EXERTION): Status: ACTIVE | Noted: 2025-03-05

## 2025-03-05 LAB
ANION GAP SERPL CALC-SCNC: 12 MMOL/L (ref 7–16)
BUN SERPL-MCNC: 31 MG/DL (ref 8–23)
CALCIUM SERPL-MCNC: 9.5 MG/DL (ref 8.8–10.2)
CHLORIDE SERPL-SCNC: 105 MMOL/L (ref 98–107)
CO2 SERPL-SCNC: 25 MMOL/L (ref 20–29)
CREAT SERPL-MCNC: 1.66 MG/DL (ref 0.8–1.3)
GLUCOSE SERPL-MCNC: 130 MG/DL (ref 70–99)
MAGNESIUM SERPL-MCNC: 2 MG/DL (ref 1.8–2.4)
POTASSIUM SERPL-SCNC: 5 MMOL/L (ref 3.5–5.1)
SODIUM SERPL-SCNC: 142 MMOL/L (ref 136–145)

## 2025-03-05 PROCEDURE — 99214 OFFICE O/P EST MOD 30 MIN: CPT | Performed by: INTERNAL MEDICINE

## 2025-03-05 PROCEDURE — G8417 CALC BMI ABV UP PARAM F/U: HCPCS | Performed by: INTERNAL MEDICINE

## 2025-03-05 PROCEDURE — 1159F MED LIST DOCD IN RCRD: CPT | Performed by: INTERNAL MEDICINE

## 2025-03-05 PROCEDURE — G8427 DOCREV CUR MEDS BY ELIG CLIN: HCPCS | Performed by: INTERNAL MEDICINE

## 2025-03-05 PROCEDURE — 4004F PT TOBACCO SCREEN RCVD TLK: CPT | Performed by: INTERNAL MEDICINE

## 2025-03-05 PROCEDURE — 1126F AMNT PAIN NOTED NONE PRSNT: CPT | Performed by: INTERNAL MEDICINE

## 2025-03-05 PROCEDURE — 3075F SYST BP GE 130 - 139MM HG: CPT | Performed by: INTERNAL MEDICINE

## 2025-03-05 PROCEDURE — 1123F ACP DISCUSS/DSCN MKR DOCD: CPT | Performed by: INTERNAL MEDICINE

## 2025-03-05 PROCEDURE — 3079F DIAST BP 80-89 MM HG: CPT | Performed by: INTERNAL MEDICINE

## 2025-03-05 RX ORDER — FUROSEMIDE 40 MG/1
40 TABLET ORAL DAILY
Qty: 90 TABLET | Refills: 3 | Status: SHIPPED | OUTPATIENT
Start: 2025-03-05

## 2025-03-05 NOTE — TELEPHONE ENCOUNTER
Patient requesting a referral to [facility] - Sulma Jorgensen DPM   Fax [phone] to have his toenails trimmed. He says his insurance requires this

## 2025-03-06 ENCOUNTER — TELEPHONE (OUTPATIENT)
Age: 80
End: 2025-03-06

## 2025-03-06 NOTE — TELEPHONE ENCOUNTER
Advised patient of lab results and Dr. Huitron's response. Advised patient that lasix 40 mg qd has been sent to [facility] [place] in [place]. Instructed patient to go to any [facility] outpatient lab for BMP and magnesium in 1 week. Patient verbalized understanding.

## 2025-03-06 NOTE — TELEPHONE ENCOUNTER
----- Message from Dr. Cruz Huitron MD sent at 3/5/2025  7:07 PM EST -----  Please let the patient know that his renal function is acceptable to start Lasix 40 mg daily.  I started the medication.  I ordered a repeat chemistry profile in 1 week.  Please encourage him to get his blood work at that time.

## 2025-03-06 NOTE — TELEPHONE ENCOUNTER
Advised wife, Katelyn, of lab results and Dr. Huitron's response. Advised Katelyn that Lasix 40 mg qd has been sent to [facility] at [place] in [place]. Advised Katelyn that patient should go to any [facility] outpatient lab for repeat BMP and magnesium in 1 week. Katelyn verbalized understanding.

## 2025-03-14 DIAGNOSIS — I10 HYPERTENSION, UNSPECIFIED TYPE: ICD-10-CM

## 2025-03-14 LAB
ANION GAP SERPL CALC-SCNC: 14 MMOL/L (ref 7–16)
BUN SERPL-MCNC: 41 MG/DL (ref 8–23)
CALCIUM SERPL-MCNC: 9.3 MG/DL (ref 8.8–10.2)
CHLORIDE SERPL-SCNC: 100 MMOL/L (ref 98–107)
CO2 SERPL-SCNC: 26 MMOL/L (ref 20–29)
CREAT SERPL-MCNC: 2.05 MG/DL (ref 0.8–1.3)
GLUCOSE SERPL-MCNC: 167 MG/DL (ref 70–99)
MAGNESIUM SERPL-MCNC: 2.3 MG/DL (ref 1.8–2.4)
POTASSIUM SERPL-SCNC: 4.3 MMOL/L (ref 3.5–5.1)
SODIUM SERPL-SCNC: 140 MMOL/L (ref 136–145)

## 2025-03-15 ENCOUNTER — RESULTS FOLLOW-UP (OUTPATIENT)
Age: 80
End: 2025-03-15

## 2025-03-17 NOTE — TELEPHONE ENCOUNTER
----- Message from Dr. Cruz Huitron MD sent at 3/15/2025  8:31 AM EDT -----  Triage, please let the patient know that his GFR is stable in the CKD stage IIIB range on PO Lasix (baseline GFR appears in the low 30s with GFR 3/2025 the exception).  He is at high risk for hypervolemia off Lasix or on lower dose Lasix given the symptoms he presented with in clinic as well as his baseline CKD.  Thus, it is acceptable to continue PO Lasix. His TTE is pending.  He should keep his appointment for the TTE and his primary cardiologist, Dr. Ramos.

## 2025-03-22 SDOH — HEALTH STABILITY: PHYSICAL HEALTH: ON AVERAGE, HOW MANY MINUTES DO YOU ENGAGE IN EXERCISE AT THIS LEVEL?: 120 MIN

## 2025-03-22 SDOH — HEALTH STABILITY: PHYSICAL HEALTH: ON AVERAGE, HOW MANY DAYS PER WEEK DO YOU ENGAGE IN MODERATE TO STRENUOUS EXERCISE (LIKE A BRISK WALK)?: 1 DAY

## 2025-03-22 ASSESSMENT — PATIENT HEALTH QUESTIONNAIRE - PHQ9
SUM OF ALL RESPONSES TO PHQ QUESTIONS 1-9: 0
SUM OF ALL RESPONSES TO PHQ QUESTIONS 1-9: 0
2. FEELING DOWN, DEPRESSED OR HOPELESS: NOT AT ALL
SUM OF ALL RESPONSES TO PHQ QUESTIONS 1-9: 0
SUM OF ALL RESPONSES TO PHQ QUESTIONS 1-9: 0
1. LITTLE INTEREST OR PLEASURE IN DOING THINGS: NOT AT ALL

## 2025-03-22 ASSESSMENT — LIFESTYLE VARIABLES
HOW MANY STANDARD DRINKS CONTAINING ALCOHOL DO YOU HAVE ON A TYPICAL DAY: 0
HOW OFTEN DO YOU HAVE A DRINK CONTAINING ALCOHOL: MONTHLY OR LESS
HOW MANY STANDARD DRINKS CONTAINING ALCOHOL DO YOU HAVE ON A TYPICAL DAY: PATIENT DOES NOT DRINK
HOW OFTEN DO YOU HAVE SIX OR MORE DRINKS ON ONE OCCASION: 1
HOW OFTEN DO YOU HAVE A DRINK CONTAINING ALCOHOL: 2

## 2025-03-25 ENCOUNTER — OFFICE VISIT (OUTPATIENT)
Dept: FAMILY MEDICINE CLINIC | Facility: CLINIC | Age: 80
End: 2025-03-25

## 2025-03-25 VITALS
OXYGEN SATURATION: 93 % | WEIGHT: 208 LBS | SYSTOLIC BLOOD PRESSURE: 100 MMHG | BODY MASS INDEX: 29.78 KG/M2 | HEIGHT: 70 IN | HEART RATE: 56 BPM | DIASTOLIC BLOOD PRESSURE: 64 MMHG

## 2025-03-25 DIAGNOSIS — R21 RASH: ICD-10-CM

## 2025-03-25 DIAGNOSIS — E11.42 TYPE 2 DIABETES MELLITUS WITH DIABETIC POLYNEUROPATHY, WITHOUT LONG-TERM CURRENT USE OF INSULIN (HCC): ICD-10-CM

## 2025-03-25 DIAGNOSIS — E78.1 PURE HYPERGLYCERIDEMIA: ICD-10-CM

## 2025-03-25 DIAGNOSIS — J44.9 CHRONIC OBSTRUCTIVE PULMONARY DISEASE, UNSPECIFIED COPD TYPE (HCC): ICD-10-CM

## 2025-03-25 DIAGNOSIS — R63.0 DECREASED APPETITE: ICD-10-CM

## 2025-03-25 DIAGNOSIS — Z00.00 MEDICARE ANNUAL WELLNESS VISIT, SUBSEQUENT: Primary | ICD-10-CM

## 2025-03-25 DIAGNOSIS — R63.4 WEIGHT LOSS: ICD-10-CM

## 2025-03-25 DIAGNOSIS — I25.10 CORONARY ARTERY DISEASE INVOLVING NATIVE CORONARY ARTERY OF NATIVE HEART WITHOUT ANGINA PECTORIS: ICD-10-CM

## 2025-03-25 DIAGNOSIS — E78.00 PURE HYPERCHOLESTEROLEMIA: ICD-10-CM

## 2025-03-25 DIAGNOSIS — N18.32 CKD STAGE 3B, GFR 30-44 ML/MIN (HCC): ICD-10-CM

## 2025-03-25 DIAGNOSIS — R60.0 LOWER EXTREMITY EDEMA: ICD-10-CM

## 2025-03-25 DIAGNOSIS — I10 PRIMARY HYPERTENSION: ICD-10-CM

## 2025-03-25 LAB
ALBUMIN SERPL-MCNC: 4.1 G/DL (ref 3.2–4.6)
ALBUMIN/GLOB SERPL: 1.3 (ref 1–1.9)
ALP SERPL-CCNC: 146 U/L (ref 40–129)
ALT SERPL-CCNC: 31 U/L (ref 8–55)
ANION GAP SERPL CALC-SCNC: 15 MMOL/L (ref 7–16)
AST SERPL-CCNC: 32 U/L (ref 15–37)
BASOPHILS # BLD: 0.03 K/UL (ref 0–0.2)
BASOPHILS NFR BLD: 0.4 % (ref 0–2)
BILIRUB SERPL-MCNC: 0.6 MG/DL (ref 0–1.2)
BUN SERPL-MCNC: 54 MG/DL (ref 8–23)
CALCIUM SERPL-MCNC: 10.2 MG/DL (ref 8.8–10.2)
CHLORIDE SERPL-SCNC: 99 MMOL/L (ref 98–107)
CHOLEST SERPL-MCNC: 150 MG/DL (ref 0–200)
CO2 SERPL-SCNC: 29 MMOL/L (ref 20–29)
CREAT SERPL-MCNC: 2.33 MG/DL (ref 0.8–1.3)
CREAT UR-MCNC: 61.3 MG/DL (ref 39–259)
DIFFERENTIAL METHOD BLD: ABNORMAL
EOSINOPHIL # BLD: 0.19 K/UL (ref 0–0.8)
EOSINOPHIL NFR BLD: 2.2 % (ref 0.5–7.8)
ERYTHROCYTE [DISTWIDTH] IN BLOOD BY AUTOMATED COUNT: 13.6 % (ref 11.9–14.6)
EST. AVERAGE GLUCOSE BLD GHB EST-MCNC: 170 MG/DL
GLOBULIN SER CALC-MCNC: 3.2 G/DL (ref 2.3–3.5)
GLUCOSE SERPL-MCNC: 133 MG/DL (ref 70–99)
HBA1C MFR BLD: 7.6 % (ref 0–5.6)
HCT VFR BLD AUTO: 55.2 % (ref 41.1–50.3)
HDLC SERPL-MCNC: 57 MG/DL (ref 40–60)
HDLC SERPL: 2.6 (ref 0–5)
HGB BLD-MCNC: 17.5 G/DL (ref 13.6–17.2)
IMM GRANULOCYTES # BLD AUTO: 0.02 K/UL (ref 0–0.5)
IMM GRANULOCYTES NFR BLD AUTO: 0.2 % (ref 0–5)
LDLC SERPL CALC-MCNC: 72 MG/DL (ref 0–100)
LYMPHOCYTES # BLD: 2.3 K/UL (ref 0.5–4.6)
LYMPHOCYTES NFR BLD: 27.1 % (ref 13–44)
MCH RBC QN AUTO: 32 PG (ref 26.1–32.9)
MCHC RBC AUTO-ENTMCNC: 31.7 G/DL (ref 31.4–35)
MCV RBC AUTO: 100.9 FL (ref 82–102)
MICROALBUMIN UR-MCNC: <1.2 MG/DL (ref 0–20)
MICROALBUMIN/CREAT UR-RTO: NORMAL MG/G (ref 0–30)
MONOCYTES # BLD: 0.93 K/UL (ref 0.1–1.3)
MONOCYTES NFR BLD: 11 % (ref 4–12)
NEUTS SEG # BLD: 5.01 K/UL (ref 1.7–8.2)
NEUTS SEG NFR BLD: 59.1 % (ref 43–78)
NRBC # BLD: 0 K/UL (ref 0–0.2)
PLATELET # BLD AUTO: 202 K/UL (ref 150–450)
PMV BLD AUTO: 12.1 FL (ref 9.4–12.3)
POTASSIUM SERPL-SCNC: 4.7 MMOL/L (ref 3.5–5.1)
PROT SERPL-MCNC: 7.3 G/DL (ref 6.3–8.2)
RBC # BLD AUTO: 5.47 M/UL (ref 4.23–5.6)
SODIUM SERPL-SCNC: 142 MMOL/L (ref 136–145)
TRIGL SERPL-MCNC: 104 MG/DL (ref 0–150)
TSH, 3RD GENERATION: 1.11 UIU/ML (ref 0.27–4.2)
VLDLC SERPL CALC-MCNC: 21 MG/DL (ref 6–23)
WBC # BLD AUTO: 8.5 K/UL (ref 4.3–11.1)

## 2025-03-25 RX ORDER — TAMSULOSIN HYDROCHLORIDE 0.4 MG/1
0.4 CAPSULE ORAL DAILY
Qty: 90 CAPSULE | Refills: 1 | Status: SHIPPED | OUTPATIENT
Start: 2025-03-25

## 2025-03-25 RX ORDER — CLOTRIMAZOLE AND BETAMETHASONE DIPROPIONATE 10; .64 MG/G; MG/G
CREAM TOPICAL
Qty: 60 G | Refills: 0 | Status: SHIPPED | OUTPATIENT
Start: 2025-03-25

## 2025-03-25 RX ORDER — GABAPENTIN 600 MG/1
600 TABLET ORAL 3 TIMES DAILY
Qty: 270 TABLET | Refills: 1 | Status: SHIPPED | OUTPATIENT
Start: 2025-03-25 | End: 2025-06-23

## 2025-03-25 RX ORDER — BUDESONIDE AND FORMOTEROL FUMARATE DIHYDRATE 160; 4.5 UG/1; UG/1
2 AEROSOL RESPIRATORY (INHALATION) AS NEEDED
Qty: 3 G | Refills: 3 | Status: SHIPPED | OUTPATIENT
Start: 2025-03-25 | End: 2025-03-28 | Stop reason: SDUPTHER

## 2025-03-25 RX ORDER — METOPROLOL SUCCINATE 50 MG/1
TABLET, EXTENDED RELEASE ORAL
Qty: 90 TABLET | Refills: 3
Start: 2025-03-25

## 2025-03-25 NOTE — PATIENT INSTRUCTIONS
Learning About Being Active as an Older Adult  Why is being active important as you get older?     Being active is one of the best things you can do for your health. And it's never too late to start. Being active--or getting active, if you aren't already--has definite benefits. It can:  Give you more energy,  Keep your mind sharp.  Improve balance to reduce your risk of falls.  Help you manage chronic illness with fewer medicines.  No matter how old you are, how fit you are, or what health problems you have, there is a form of activity that will work for you. And the more physical activity you can do, the better your overall health will be.  What kinds of activity can help you stay healthy?  Being more active will make your daily activities easier. Physical activity includes planned exercise and things you do in daily life. There are four types of activity:  Aerobic.  Doing aerobic activity makes your heart and lungs strong.  Includes walking, dancing, and gardening.  Aim for at least 2½ hours spread throughout the week.  It improves your energy and can help you sleep better.  Muscle-strengthening.  This type of activity can help maintain muscle and strengthen bones.  Includes climbing stairs, using resistance bands, and lifting or carrying heavy loads.  Aim for at least twice a week.  It can help protect the knees and other joints.  Stretching.  Stretching gives you better range of motion in joints and muscles.  Includes upper arm stretches, calf stretches, and gentle yoga.  Aim for at least twice a week, preferably after your muscles are warmed up from other activities.  It can help you function better in daily life.  Balancing.  This helps you stay coordinated and have good posture.  Includes heel-to-toe walking, isaiah chi, and certain types of yoga.  Aim for at least 3 days a week.  It can reduce your risk of falling.  Even if you have a hard time meeting the recommendations, it's better to be more active

## 2025-03-25 NOTE — PROGRESS NOTES
Medicare Annual Wellness Visit    Humphrey Villalpando is here for Medicare AWV (subs)    Assessment & Plan   Medicare annual wellness visit, subsequent  Chronic obstructive pulmonary disease, unspecified COPD type (HCC)  The following orders have not been finalized:  -     budesonide-formoterol (SYMBICORT) 160-4.5 MCG/ACT AERO  Type 2 diabetes mellitus with diabetic polyneuropathy, without long-term current use of insulin (HCC)  The following orders have not been finalized:  -     gabapentin (NEURONTIN) 600 MG tablet       No follow-ups on file.     Subjective   who has a past medical history significant for hypertension, high cholesterol, high triglycerides, diabetes with polyneuropathy, BPH with urinary retention followed by urology, chronic kidney disease followed by nephrology, CAD followed by cardiology and COPD.      Patient's complete Health Risk Assessment and screening values have been reviewed and are found in Flowsheets. The following problems were reviewed today and where indicated follow up appointments were made and/or referrals ordered.    Positive Risk Factor Screenings with Interventions:              Inactivity:  On average, how many days per week do you engage in moderate to strenuous exercise (like a brisk walk)?: (Patient-Rptd) 1 day (!) Abnormal  On average, how many minutes do you engage in exercise at this level?: (Patient-Rptd) 120 min  Interventions:  Patient declined any further interventions or treatment    Poor Eating Habits/Diet:  Do you eat balanced/healthy meals regularly?: (!) (Patient-Rptd) No  Interventions:  Patient declines any further evaluation or treatment     Dentist Screen:  Have you seen the dentist within the past year?: (!) (Patient-Rptd) No    Intervention:  Advised to schedule with their dentist      Safety:  Do you have non-slip mats or non-slip surfaces or shower bars or grab bars in your shower or bathtub?: (!) (Patient-Rptd) No  Interventions:  Patient declined any

## 2025-03-25 NOTE — PROGRESS NOTES
\"Have you been to the ER, urgent care clinic since your last visit?  Hospitalized since your last visit?\"    YES - When: approximately 3  weeks ago.  Where and Why: Emergency care due to foot turning black.    “Have you seen or consulted any other health care providers outside our system since your last visit?”    NO

## 2025-03-25 NOTE — PROGRESS NOTES
SUBJECTIVE:   Humphrey Villalpando is a 79 y.o. 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  At previous visit the patient had reported some issues with worsening peripheral edema.  Please refer to prior notes for details.  As a consequence I discontinued his Actos and diltiazem.  To compensate for this I switched him to Toprol-XL 50 mg a day and increase his glipizide from 5 mg to 10 mg.  He reports significant improvement in his edema although it was not complete.  He went to his cardiologist because of some dyspnea on exertion in addition to the edema.  It was determined that he might be in fluid overload.  Lasix 40 mg a day was added.  Patient states that he feels much better and is breathing easier.  He reports however a decrease in appetite since the Lasix was added and an increase in urination.  He has had no falls but does feel at times \"a little weak\".  He denies chest pain, orthopnea or PND.  He does report that he has a mildly 8.  Rash on the lateral aspect of his right foot that he would like for me to check.  Has been there for several weeks.    HPI  See above    Past Medical History, Past Surgical History, Family history, Social History, and Medications were all reviewed with the patient today and updated as necessary.       Current Outpatient Medications   Medication Sig Dispense Refill    budesonide-formoterol (SYMBICORT) 160-4.5 MCG/ACT AERO Inhale 2 puffs into the lungs as needed (wheezing) 3 g 3    gabapentin (NEURONTIN) 600 MG tablet Take 1 tablet by mouth 3 times daily for 90 days. 270 tablet 1    tamsulosin (FLOMAX) 0.4 MG capsule Take 1 capsule by mouth daily 90 capsule 1    clotrimazole-betamethasone (LOTRISONE) 1-0.05 % cream Apply topically 2 times daily. 60 g 0

## 2025-03-26 ENCOUNTER — RESULTS FOLLOW-UP (OUTPATIENT)
Dept: FAMILY MEDICINE CLINIC | Facility: CLINIC | Age: 80
End: 2025-03-26

## 2025-03-26 NOTE — RESULT ENCOUNTER NOTE
Patient called and was informed of lab results per NAYA Parks. Patient also advised to stay hydrated, continue current regimen as planned and keep follow up appointment with Dr. Mitchell. Patient was in agreement with no further questions.

## 2025-03-27 ENCOUNTER — TELEPHONE (OUTPATIENT)
Dept: FAMILY MEDICINE CLINIC | Facility: CLINIC | Age: 80
End: 2025-03-27

## 2025-03-27 DIAGNOSIS — J44.9 CHRONIC OBSTRUCTIVE PULMONARY DISEASE, UNSPECIFIED COPD TYPE (HCC): ICD-10-CM

## 2025-03-27 NOTE — TELEPHONE ENCOUNTER
Pharmacy called and stated that they need a new rx for the Symbicort inhaler. Max daily amount needed..ibuprofen found an old rx from 2021 and it showed.. budesonide-formoteroL (SYMBICORT) 80-4.5 mcg/actuation HFAA Take 2 Puffs by inhalation two (2) times a day.    Also they said they can't fill it in grams and it needs to say 30.6 or \"each\" on the dispense amount...

## 2025-03-28 ENCOUNTER — RESULTS FOLLOW-UP (OUTPATIENT)
Age: 80
End: 2025-03-28

## 2025-03-28 RX ORDER — BUDESONIDE AND FORMOTEROL FUMARATE DIHYDRATE 160; 4.5 UG/1; UG/1
2 AEROSOL RESPIRATORY (INHALATION) AS NEEDED
Qty: 3 EACH | Refills: 3 | Status: SHIPPED | OUTPATIENT
Start: 2025-03-28 | End: 2025-03-28 | Stop reason: SDUPTHER

## 2025-03-28 RX ORDER — BUDESONIDE AND FORMOTEROL FUMARATE DIHYDRATE 160; 4.5 UG/1; UG/1
2 AEROSOL RESPIRATORY (INHALATION) 2 TIMES DAILY
Qty: 3 EACH | Refills: 3 | Status: SHIPPED | OUTPATIENT
Start: 2025-03-28

## 2025-03-28 NOTE — TELEPHONE ENCOUNTER
----- Message from Dr. Cruz Huitron MD sent at 3/28/2025  8:33 AM EDT -----  Please let the patient know his EF has declined, and he close follow up with his primary cardiologist to further discuss.

## 2025-04-19 NOTE — PROGRESS NOTES
DAILY 90 tablet 3    SITagliptin (JANUVIA) 50 MG tablet Take 1 tablet by mouth daily 90 tablet 3    albuterol sulfate HFA (PROVENTIL HFA) 108 (90 Base) MCG/ACT inhaler Inhale 2 puffs into the lungs every 6 hours as needed for Wheezing or Shortness of Breath 1 each 5    DULoxetine (CYMBALTA) 60 MG extended release capsule TAKE ONE CAPSULE BY MOUTH TWICE A  capsule 3    docusate sodium (COLACE) 100 MG capsule Take 2 capsules by mouth at bedtime      Lancets MISC Please dispense lancets for patient to check FSBS four times a day       No current facility-administered medications for this visit.            Allergies   Allergen Reactions    Codeine Nausea And Vomiting       Patient Active Problem List    Diagnosis Date Noted    Olecranon bursitis of right elbow 09/08/2023     Priority: Low    Radiologic findings of lung field, abnormal 02/16/2022     Priority: Low    Incarcerated left inguinal hernia 02/16/2022     Priority: Low     Overview Note:     7/12/22 s/p open LIH with mesh; Dr Gasca      Hiatal hernia 02/16/2022     Priority: Low    Renal cyst, left 02/16/2022     Priority: Low    Bilateral kidney stones 02/16/2022     Priority: Low    Elevated alkaline phosphatase level 02/15/2022     Priority: Low    Syncope 10/15/2021     Priority: Low    Syncope and collapse 10/14/2021     Priority: Low    CKD stage 3b, GFR 30-44 ml/min ([facility]) 06/21/2021     Priority: Low    Sinus tachycardia 05/20/2020     Priority: Low    CAD in native artery 05/25/2017     Priority: Low    Tobacco abuse 05/25/2017     Priority: Low    Basal cell carcinoma of scalp 09/14/2015     Priority: Low    Type 2 diabetes mellitus with diabetic polyneuropathy, without long-term current use of insulin ([facility]) 07/01/2015     Priority: Low    COPD (chronic obstructive pulmonary disease) ([facility]) 07/01/2015     Priority: Low    Hypertension 07/01/2015     Priority: Low    Hyperlipidemia 07/01/2015     Priority: Low    Carotid atherosclerosis,

## 2025-04-22 ENCOUNTER — OFFICE VISIT (OUTPATIENT)
Age: 80
End: 2025-04-22
Payer: MEDICARE

## 2025-04-22 VITALS
DIASTOLIC BLOOD PRESSURE: 70 MMHG | HEIGHT: 70 IN | HEART RATE: 76 BPM | BODY MASS INDEX: 29.38 KG/M2 | SYSTOLIC BLOOD PRESSURE: 116 MMHG | WEIGHT: 205.2 LBS

## 2025-04-22 DIAGNOSIS — I10 PRIMARY HYPERTENSION: ICD-10-CM

## 2025-04-22 DIAGNOSIS — E78.2 MIXED HYPERLIPIDEMIA: ICD-10-CM

## 2025-04-22 DIAGNOSIS — Z72.0 TOBACCO ABUSE: ICD-10-CM

## 2025-04-22 DIAGNOSIS — I65.23 CAROTID ATHEROSCLEROSIS, BILATERAL: ICD-10-CM

## 2025-04-22 DIAGNOSIS — I50.21 ACUTE SYSTOLIC HEART FAILURE (HCC): Primary | ICD-10-CM

## 2025-04-22 DIAGNOSIS — I25.10 CAD IN NATIVE ARTERY: ICD-10-CM

## 2025-04-22 PROCEDURE — 1160F RVW MEDS BY RX/DR IN RCRD: CPT | Performed by: INTERNAL MEDICINE

## 2025-04-22 PROCEDURE — G8417 CALC BMI ABV UP PARAM F/U: HCPCS | Performed by: INTERNAL MEDICINE

## 2025-04-22 PROCEDURE — 3078F DIAST BP <80 MM HG: CPT | Performed by: INTERNAL MEDICINE

## 2025-04-22 PROCEDURE — 1123F ACP DISCUSS/DSCN MKR DOCD: CPT | Performed by: INTERNAL MEDICINE

## 2025-04-22 PROCEDURE — 1159F MED LIST DOCD IN RCRD: CPT | Performed by: INTERNAL MEDICINE

## 2025-04-22 PROCEDURE — 3074F SYST BP LT 130 MM HG: CPT | Performed by: INTERNAL MEDICINE

## 2025-04-22 PROCEDURE — 4004F PT TOBACCO SCREEN RCVD TLK: CPT | Performed by: INTERNAL MEDICINE

## 2025-04-22 PROCEDURE — G8427 DOCREV CUR MEDS BY ELIG CLIN: HCPCS | Performed by: INTERNAL MEDICINE

## 2025-04-22 PROCEDURE — 99215 OFFICE O/P EST HI 40 MIN: CPT | Performed by: INTERNAL MEDICINE

## 2025-04-22 PROCEDURE — 93000 ELECTROCARDIOGRAM COMPLETE: CPT | Performed by: INTERNAL MEDICINE

## 2025-04-22 PROCEDURE — 1126F AMNT PAIN NOTED NONE PRSNT: CPT | Performed by: INTERNAL MEDICINE

## 2025-04-22 RX ORDER — HYDROCORTISONE SODIUM SUCCINATE 100 MG/2ML
200 INJECTION INTRAMUSCULAR; INTRAVENOUS ONCE
OUTPATIENT
Start: 2025-04-22 | End: 2025-04-22

## 2025-04-22 RX ORDER — SODIUM CHLORIDE 9 MG/ML
INJECTION, SOLUTION INTRAVENOUS CONTINUOUS
OUTPATIENT
Start: 2025-04-22

## 2025-04-22 RX ORDER — SODIUM CHLORIDE 9 MG/ML
INJECTION, SOLUTION INTRAVENOUS PRN
OUTPATIENT
Start: 2025-04-22

## 2025-04-22 RX ORDER — ONDANSETRON 2 MG/ML
4 INJECTION INTRAMUSCULAR; INTRAVENOUS EVERY 6 HOURS PRN
OUTPATIENT
Start: 2025-04-22

## 2025-04-22 RX ORDER — SODIUM CHLORIDE 0.9 % (FLUSH) 0.9 %
5-40 SYRINGE (ML) INJECTION PRN
OUTPATIENT
Start: 2025-04-22

## 2025-04-22 RX ORDER — DIPHENHYDRAMINE HYDROCHLORIDE 50 MG/ML
50 INJECTION, SOLUTION INTRAMUSCULAR; INTRAVENOUS ONCE
OUTPATIENT
Start: 2025-04-22 | End: 2025-04-22

## 2025-04-22 RX ORDER — SODIUM CHLORIDE 0.9 % (FLUSH) 0.9 %
5-40 SYRINGE (ML) INJECTION EVERY 12 HOURS SCHEDULED
OUTPATIENT
Start: 2025-04-22

## 2025-04-22 RX ORDER — ASPIRIN 325 MG
325 TABLET ORAL ONCE
OUTPATIENT
Start: 2025-04-22 | End: 2025-04-22

## 2025-04-23 PROBLEM — R07.9 CHEST PAIN: Status: ACTIVE | Noted: 2025-04-22

## 2025-04-24 DIAGNOSIS — I10 PRIMARY HYPERTENSION: ICD-10-CM

## 2025-04-24 DIAGNOSIS — Z72.0 TOBACCO ABUSE: ICD-10-CM

## 2025-04-24 DIAGNOSIS — E78.2 MIXED HYPERLIPIDEMIA: ICD-10-CM

## 2025-04-24 DIAGNOSIS — I50.21 ACUTE SYSTOLIC HEART FAILURE (HCC): ICD-10-CM

## 2025-04-24 DIAGNOSIS — I25.10 CAD IN NATIVE ARTERY: ICD-10-CM

## 2025-04-24 LAB
ANION GAP SERPL CALC-SCNC: 13 MMOL/L (ref 7–16)
BASOPHILS # BLD: 0.04 K/UL (ref 0–0.2)
BASOPHILS NFR BLD: 0.5 % (ref 0–2)
BUN SERPL-MCNC: 42 MG/DL (ref 8–23)
CALCIUM SERPL-MCNC: 9.6 MG/DL (ref 8.8–10.2)
CHLORIDE SERPL-SCNC: 98 MMOL/L (ref 98–107)
CO2 SERPL-SCNC: 27 MMOL/L (ref 20–29)
CREAT SERPL-MCNC: 1.98 MG/DL (ref 0.8–1.3)
DIFFERENTIAL METHOD BLD: ABNORMAL
EOSINOPHIL # BLD: 0.22 K/UL (ref 0–0.8)
EOSINOPHIL NFR BLD: 2.5 % (ref 0.5–7.8)
ERYTHROCYTE [DISTWIDTH] IN BLOOD BY AUTOMATED COUNT: 13.7 % (ref 11.9–14.6)
GLUCOSE SERPL-MCNC: 143 MG/DL (ref 70–99)
HCT VFR BLD AUTO: 51.3 % (ref 41.1–50.3)
HGB BLD-MCNC: 16.9 G/DL (ref 13.6–17.2)
IMM GRANULOCYTES # BLD AUTO: 0.05 K/UL (ref 0–0.5)
IMM GRANULOCYTES NFR BLD AUTO: 0.6 % (ref 0–5)
LYMPHOCYTES # BLD: 2.15 K/UL (ref 0.5–4.6)
LYMPHOCYTES NFR BLD: 24.9 % (ref 13–44)
MCH RBC QN AUTO: 32.1 PG (ref 26.1–32.9)
MCHC RBC AUTO-ENTMCNC: 32.9 G/DL (ref 31.4–35)
MCV RBC AUTO: 97.5 FL (ref 82–102)
MONOCYTES # BLD: 0.87 K/UL (ref 0.1–1.3)
MONOCYTES NFR BLD: 10.1 % (ref 4–12)
NEUTS SEG # BLD: 5.3 K/UL (ref 1.7–8.2)
NEUTS SEG NFR BLD: 61.4 % (ref 43–78)
NRBC # BLD: 0 K/UL (ref 0–0.2)
PLATELET # BLD AUTO: 198 K/UL (ref 150–450)
PMV BLD AUTO: 12.4 FL (ref 9.4–12.3)
POTASSIUM SERPL-SCNC: 4.2 MMOL/L (ref 3.5–5.1)
RBC # BLD AUTO: 5.26 M/UL (ref 4.23–5.6)
SODIUM SERPL-SCNC: 138 MMOL/L (ref 136–145)
WBC # BLD AUTO: 8.6 K/UL (ref 4.3–11.1)

## 2025-05-06 ENCOUNTER — OFFICE VISIT (OUTPATIENT)
Dept: FAMILY MEDICINE CLINIC | Facility: CLINIC | Age: 80
End: 2025-05-06
Payer: MEDICARE

## 2025-05-06 VITALS
SYSTOLIC BLOOD PRESSURE: 102 MMHG | WEIGHT: 204 LBS | HEIGHT: 70 IN | BODY MASS INDEX: 29.2 KG/M2 | DIASTOLIC BLOOD PRESSURE: 68 MMHG | HEART RATE: 70 BPM | OXYGEN SATURATION: 94 %

## 2025-05-06 DIAGNOSIS — J44.9 CHRONIC OBSTRUCTIVE PULMONARY DISEASE, UNSPECIFIED COPD TYPE (HCC): ICD-10-CM

## 2025-05-06 DIAGNOSIS — R33.8 BENIGN PROSTATIC HYPERPLASIA WITH URINARY RETENTION: ICD-10-CM

## 2025-05-06 DIAGNOSIS — M25.512 CHRONIC LEFT SHOULDER PAIN: ICD-10-CM

## 2025-05-06 DIAGNOSIS — I25.10 CAD IN NATIVE ARTERY: ICD-10-CM

## 2025-05-06 DIAGNOSIS — G89.29 CHRONIC LEFT SHOULDER PAIN: ICD-10-CM

## 2025-05-06 DIAGNOSIS — E11.42 TYPE 2 DIABETES MELLITUS WITH DIABETIC POLYNEUROPATHY, WITHOUT LONG-TERM CURRENT USE OF INSULIN (HCC): ICD-10-CM

## 2025-05-06 DIAGNOSIS — E78.1 PURE HYPERGLYCERIDEMIA: ICD-10-CM

## 2025-05-06 DIAGNOSIS — I10 PRIMARY HYPERTENSION: ICD-10-CM

## 2025-05-06 DIAGNOSIS — N40.1 BENIGN PROSTATIC HYPERPLASIA WITH URINARY RETENTION: ICD-10-CM

## 2025-05-06 DIAGNOSIS — N18.32 CKD STAGE 3B, GFR 30-44 ML/MIN (HCC): ICD-10-CM

## 2025-05-06 DIAGNOSIS — E78.00 PURE HYPERCHOLESTEROLEMIA: Primary | ICD-10-CM

## 2025-05-06 PROCEDURE — 4004F PT TOBACCO SCREEN RCVD TLK: CPT | Performed by: FAMILY MEDICINE

## 2025-05-06 PROCEDURE — G8427 DOCREV CUR MEDS BY ELIG CLIN: HCPCS | Performed by: FAMILY MEDICINE

## 2025-05-06 PROCEDURE — G2211 COMPLEX E/M VISIT ADD ON: HCPCS | Performed by: FAMILY MEDICINE

## 2025-05-06 PROCEDURE — 3051F HG A1C>EQUAL 7.0%<8.0%: CPT | Performed by: FAMILY MEDICINE

## 2025-05-06 PROCEDURE — G8417 CALC BMI ABV UP PARAM F/U: HCPCS | Performed by: FAMILY MEDICINE

## 2025-05-06 PROCEDURE — 99214 OFFICE O/P EST MOD 30 MIN: CPT | Performed by: FAMILY MEDICINE

## 2025-05-06 PROCEDURE — 1159F MED LIST DOCD IN RCRD: CPT | Performed by: FAMILY MEDICINE

## 2025-05-06 PROCEDURE — 3078F DIAST BP <80 MM HG: CPT | Performed by: FAMILY MEDICINE

## 2025-05-06 PROCEDURE — 3023F SPIROM DOC REV: CPT | Performed by: FAMILY MEDICINE

## 2025-05-06 PROCEDURE — 1123F ACP DISCUSS/DSCN MKR DOCD: CPT | Performed by: FAMILY MEDICINE

## 2025-05-06 PROCEDURE — 3074F SYST BP LT 130 MM HG: CPT | Performed by: FAMILY MEDICINE

## 2025-05-06 NOTE — PROGRESS NOTES
SUBJECTIVE:   Humphrey Villalpando is a 79 y.o. 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  Since I last seen the patient has been evaluated by his cardiologist and is scheduled for a heart cath this Thursday.  He has recently been experiencing worsening peripheral edema.  I made some adjustments in his medications to include discontinuing his Actos and Cardizem.  His cardiologist also placed him on Lasix 40 mg a day.  His edema has improved significantly and he diuresed about 30 pounds.  I started him on metoprolol XL 50 mg to replace the Cardizem.  He is tolerated this well.  Patient reports no active chest pain, orthopnea or PND.  As mentioned peripheral edema is much better.  His weight loss has stabilized.  His appetite seems to be slightly improved.    HPI  See above    Past Medical History, Past Surgical History, Family history, Social History, and Medications were all reviewed with the patient today and updated as necessary.       Current Outpatient Medications   Medication Sig Dispense Refill    budesonide-formoterol (SYMBICORT) 160-4.5 MCG/ACT AERO Inhale 2 puffs into the lungs 2 times daily Rinse mouth after use 3 each 3    gabapentin (NEURONTIN) 600 MG tablet Take 1 tablet by mouth 3 times daily for 90 days. 270 tablet 1    tamsulosin (FLOMAX) 0.4 MG capsule Take 1 capsule by mouth daily 90 capsule 1    clotrimazole-betamethasone (LOTRISONE) 1-0.05 % cream Apply topically 2 times daily. 60 g 0    metoprolol succinate (TOPROL XL) 50 MG extended release tablet Half a tablet once a day 90 tablet 3    furosemide (LASIX) 40 MG tablet Take 1 tablet by mouth daily 90 tablet 3    glipiZIDE (GLUCOTROL XL) 10 MG extended release tablet Take 1 tablet by mouth daily 90 tablet 3    atorvastatin

## 2025-05-07 NOTE — PROGRESS NOTES
Patient pre-assessment complete for [facility] scheduled for , arrival time 0700 for iv hydration. Patient verified using . Patient instructed to bring a list of all home medications on the day of procedure. NPO status reinforced. Patient informed to take a full dose aspirin 325mg  or 81 mg x 4 on the day of procedure. Patient instructed to HOLD lasix, glipizide, jardiance, and januvia. Instructed they can take all other medications excluding vitamins & supplements. Patient verbalizes understanding of all instructions & denies any questions at this time.

## 2025-05-08 ENCOUNTER — HOSPITAL ENCOUNTER (OUTPATIENT)
Age: 80
Setting detail: OUTPATIENT SURGERY
Discharge: HOME | End: 2025-05-08
Attending: INTERNAL MEDICINE | Admitting: INTERNAL MEDICINE
Payer: MEDICARE

## 2025-05-08 VITALS
WEIGHT: 200 LBS | DIASTOLIC BLOOD PRESSURE: 47 MMHG | HEART RATE: 72 BPM | BODY MASS INDEX: 28.63 KG/M2 | SYSTOLIC BLOOD PRESSURE: 91 MMHG | HEIGHT: 70 IN | RESPIRATION RATE: 16 BRPM | OXYGEN SATURATION: 93 % | TEMPERATURE: 97.9 F

## 2025-05-08 DIAGNOSIS — R07.9 CHEST PAIN: ICD-10-CM

## 2025-05-08 PROBLEM — I50.22 CHRONIC SYSTOLIC CONGESTIVE HEART FAILURE (HCC): Status: ACTIVE | Noted: 2025-05-08

## 2025-05-08 LAB
ANION GAP SERPL CALC-SCNC: 12 MMOL/L (ref 7–16)
BUN SERPL-MCNC: 35 MG/DL (ref 8–23)
CALCIUM SERPL-MCNC: 8.6 MG/DL (ref 8.8–10.2)
CHLORIDE SERPL-SCNC: 103 MMOL/L (ref 98–107)
CO2 SERPL-SCNC: 26 MMOL/L (ref 20–29)
CREAT BLD-MCNC: 1.8 MG/DL (ref 0.8–1.5)
CREAT SERPL-MCNC: 1.98 MG/DL (ref 0.8–1.3)
ECHO BSA: 2.12 M2
EKG ATRIAL RATE: 72 BPM
EKG DIAGNOSIS: NORMAL
EKG P AXIS: 50 DEGREES
EKG P-R INTERVAL: 184 MS
EKG Q-T INTERVAL: 426 MS
EKG QRS DURATION: 90 MS
EKG QTC CALCULATION (BAZETT): 466 MS
EKG R AXIS: -5 DEGREES
EKG T AXIS: 2 DEGREES
EKG VENTRICULAR RATE: 72 BPM
ERYTHROCYTE [DISTWIDTH] IN BLOOD BY AUTOMATED COUNT: 13.9 % (ref 11.9–14.6)
GLUCOSE SERPL-MCNC: 122 MG/DL (ref 70–99)
HCT VFR BLD AUTO: 51.8 % (ref 41.1–50.3)
HGB BLD-MCNC: 17.1 G/DL (ref 13.6–17.2)
MCH RBC QN AUTO: 32.6 PG (ref 26.1–32.9)
MCHC RBC AUTO-ENTMCNC: 33 G/DL (ref 31.4–35)
MCV RBC AUTO: 98.9 FL (ref 82–102)
NRBC # BLD: 0 K/UL (ref 0–0.2)
PLATELET # BLD AUTO: 187 K/UL (ref 150–450)
PMV BLD AUTO: 11.4 FL (ref 9.4–12.3)
POTASSIUM BLD-SCNC: 5.9 MMOL/L (ref 3.5–5.1)
POTASSIUM SERPL-SCNC: ABNORMAL MMOL/L (ref 3.5–5.1)
RBC # BLD AUTO: 5.24 M/UL (ref 4.23–5.6)
SODIUM SERPL-SCNC: 141 MMOL/L (ref 136–145)
WBC # BLD AUTO: 9.4 K/UL (ref 4.3–11.1)

## 2025-05-08 PROCEDURE — 99152 MOD SED SAME PHYS/QHP 5/>YRS: CPT | Performed by: INTERNAL MEDICINE

## 2025-05-08 PROCEDURE — 93571 IV DOP VEL&/PRESS C FLO 1ST: CPT | Performed by: INTERNAL MEDICINE

## 2025-05-08 PROCEDURE — 84132 ASSAY OF SERUM POTASSIUM: CPT

## 2025-05-08 PROCEDURE — 2500000003 HC RX 250 WO HCPCS: Performed by: INTERNAL MEDICINE

## 2025-05-08 PROCEDURE — 85347 COAGULATION TIME ACTIVATED: CPT

## 2025-05-08 PROCEDURE — 2580000003 HC RX 258: Performed by: INTERNAL MEDICINE

## 2025-05-08 PROCEDURE — 80048 BASIC METABOLIC PNL TOTAL CA: CPT

## 2025-05-08 PROCEDURE — 6360000002 HC RX W HCPCS: Performed by: INTERNAL MEDICINE

## 2025-05-08 PROCEDURE — 2709999900 HC NON-CHARGEABLE SUPPLY: Performed by: INTERNAL MEDICINE

## 2025-05-08 PROCEDURE — C1887 CATHETER, GUIDING: HCPCS | Performed by: INTERNAL MEDICINE

## 2025-05-08 PROCEDURE — 93452 LEFT HRT CATH W/VENTRCLGRPHY: CPT | Performed by: INTERNAL MEDICINE

## 2025-05-08 PROCEDURE — 93458 L HRT ARTERY/VENTRICLE ANGIO: CPT | Performed by: INTERNAL MEDICINE

## 2025-05-08 PROCEDURE — 85027 COMPLETE CBC AUTOMATED: CPT

## 2025-05-08 PROCEDURE — C1894 INTRO/SHEATH, NON-LASER: HCPCS | Performed by: INTERNAL MEDICINE

## 2025-05-08 PROCEDURE — 6360000004 HC RX CONTRAST MEDICATION: Performed by: INTERNAL MEDICINE

## 2025-05-08 PROCEDURE — 93005 ELECTROCARDIOGRAM TRACING: CPT | Performed by: INTERNAL MEDICINE

## 2025-05-08 PROCEDURE — 93010 ELECTROCARDIOGRAM REPORT: CPT | Performed by: INTERNAL MEDICINE

## 2025-05-08 PROCEDURE — 99153 MOD SED SAME PHYS/QHP EA: CPT | Performed by: INTERNAL MEDICINE

## 2025-05-08 PROCEDURE — C1769 GUIDE WIRE: HCPCS | Performed by: INTERNAL MEDICINE

## 2025-05-08 RX ORDER — LIDOCAINE HYDROCHLORIDE 10 MG/ML
INJECTION, SOLUTION INFILTRATION; PERINEURAL PRN
Status: DISCONTINUED | OUTPATIENT
Start: 2025-05-08 | End: 2025-05-08 | Stop reason: HOSPADM

## 2025-05-08 RX ORDER — IOPAMIDOL 755 MG/ML
INJECTION, SOLUTION INTRAVASCULAR PRN
Status: DISCONTINUED | OUTPATIENT
Start: 2025-05-08 | End: 2025-05-08 | Stop reason: HOSPADM

## 2025-05-08 RX ORDER — ACETAMINOPHEN 325 MG/1
650 TABLET ORAL EVERY 4 HOURS PRN
Status: DISCONTINUED | OUTPATIENT
Start: 2025-05-08 | End: 2025-05-08 | Stop reason: HOSPADM

## 2025-05-08 RX ORDER — NITROGLYCERIN 20 MG/100ML
INJECTION INTRAVENOUS PRN
Status: DISCONTINUED | OUTPATIENT
Start: 2025-05-08 | End: 2025-05-08 | Stop reason: HOSPADM

## 2025-05-08 RX ORDER — SODIUM CHLORIDE 0.9 % (FLUSH) 0.9 %
5-40 SYRINGE (ML) INJECTION PRN
Status: DISCONTINUED | OUTPATIENT
Start: 2025-05-08 | End: 2025-05-08 | Stop reason: HOSPADM

## 2025-05-08 RX ORDER — MIDAZOLAM HYDROCHLORIDE 1 MG/ML
INJECTION, SOLUTION INTRAMUSCULAR; INTRAVENOUS PRN
Status: DISCONTINUED | OUTPATIENT
Start: 2025-05-08 | End: 2025-05-08 | Stop reason: HOSPADM

## 2025-05-08 RX ORDER — SODIUM CHLORIDE 9 MG/ML
INJECTION, SOLUTION INTRAVENOUS CONTINUOUS
Status: DISCONTINUED | OUTPATIENT
Start: 2025-05-08 | End: 2025-05-08 | Stop reason: HOSPADM

## 2025-05-08 RX ORDER — SODIUM CHLORIDE 0.9 % (FLUSH) 0.9 %
5-40 SYRINGE (ML) INJECTION EVERY 12 HOURS SCHEDULED
Status: DISCONTINUED | OUTPATIENT
Start: 2025-05-08 | End: 2025-05-08 | Stop reason: HOSPADM

## 2025-05-08 RX ORDER — BIVALIRUDIN 250 MG/5ML
INJECTION, POWDER, LYOPHILIZED, FOR SOLUTION INTRAVENOUS PRN
Status: DISCONTINUED | OUTPATIENT
Start: 2025-05-08 | End: 2025-05-08 | Stop reason: HOSPADM

## 2025-05-08 RX ORDER — ASPIRIN 81 MG/1
324 TABLET, CHEWABLE ORAL ONCE
Status: DISCONTINUED | OUTPATIENT
Start: 2025-05-08 | End: 2025-05-08 | Stop reason: HOSPADM

## 2025-05-08 RX ORDER — HEPARIN SODIUM 200 [USP'U]/100ML
INJECTION, SOLUTION INTRAVENOUS CONTINUOUS PRN
Status: DISCONTINUED | OUTPATIENT
Start: 2025-05-08 | End: 2025-05-08 | Stop reason: HOSPADM

## 2025-05-08 ASSESSMENT — ENCOUNTER SYMPTOMS
LEFT EYE: 0
SHORTNESS OF BREATH: 0
RIGHT EYE: 0
COLOR CHANGE: 0
WHEEZING: 0
SORE THROAT: 0
ABDOMINAL PAIN: 0
VOMITING: 0
HEMOPTYSIS: 0
HEMATEMESIS: 0
DIARRHEA: 0
COUGH: 0
BLURRED VISION: 0
HEARTBURN: 0
SPUTUM PRODUCTION: 0
ORTHOPNEA: 0
NAUSEA: 0
CONSTIPATION: 0
BACK PAIN: 0

## 2025-05-08 NOTE — PROGRESS NOTES
Patient received to CPRU room # 14  Ambulatory from [facility]. Patient scheduled for [facility] today with Dr Ramos. Procedure reviewed & questions answered, voiced good understanding consent obtained & placed on chart. All medications and medical history reviewed. Will prep patient per orders. Patient & family updated on plan of care.      The patient has a fraility score of 3-MANAGING WELL, based on ability to perform ADLs independently.     Pt took ASA 324mg at 0530.

## 2025-05-08 NOTE — PROGRESS NOTES
TRANSFER - OUT REPORT:    Verbal report given to JANAY cochran on Humphrey Villalpando  being transferred to CPRU for ordered procedure       Report consisted of patient’s Situation, Background, Assessment and Recommendations(SBAR).     Information from the following report(s) Procedure Summary, MAR, and Med Rec Status was reviewed with the receiving nurse.    Opportunity for questions and clarification was provided.      LHC by Dr. fulton  Right radial access  Pressure wire positive to LAD   No interventions, cv   Right wrist TR band with 12 mL  3 mg Versed  50 mcg Fentanyl  5,000 units of heparin  Angiomax bolus and gtt, turned off at 0959  Access site soft. Clean, dry, and intact; with no signs of bleeding or hematoma  Pt. Tolerated procedure

## 2025-05-08 NOTE — DISCHARGE INSTRUCTIONS
HEART CATHETERIZATION/ANGIOGRAPHY DISCHARGE INSTRUCTIONS    Check puncture site frequently for swelling or bleeding. If there is any bleeding, apply pressure over the area with a clean towel or washcloth and call 911. Notify your doctor for any redness, swelling, drainage, or oozing from the puncture site. Notify your doctor for any fever or chills.  If the extremity becomes cold, numb, or painful call [facility] at [phone].  Activity should be limited for the next 48 hours. No heavy lifting, pushing, pulling  or strenuous activity for 48 hours. No heavy lifting (anything over 10 pounds) for 3 days.  You may resume your usual diet. Drink more fluids than usual.  Have a responsible person drive you home and stay with you for at least 24 hours after your heart catheterization/angiography.  You may remove bandage from your right wrist in 24 hours. You may shower in 24 hours. No tub baths, hot tubs, or swimming for 1 week. Do not place any lotions, creams, powders, or ointments over puncture site for 1 week. You may place a clean band-aid over the puncture site each day for 5 days. Change daily.        Sedation for a Medical Procedure: Care Instructions     You were given a sedative medication during your visit. While many of the effects will have worn   off before you leave; you may continue to feel some effects for several hours.      Common side effects from sedation include:  Feeling sleepy. (Your doctors and nurses will make sure you are not too sleepy to go home.)  Nausea and vomiting. This usually does not last long.  Feeling tired.     How can you care for yourself at home?  Activity    Don't do anything for 24 hours that requires attention to detail. It takes time for the medicine effects to completely wear off.     Do not make important legal decisions for 24 hours.     Do not sign any legal documents for 24 hours.     Do not drink alcohol today     For your safety, you should not drive or operate

## 2025-05-08 NOTE — CONSULTS
MD Bruce King MD R. Grant Willis, MD, MS          CONSULT NOTE    Humphrey Jer Balbuenaell         2025    REFERRING PHYSICIAN:  Dr. Ramos      HISTORY OF PRESENT ILLNESS:  The patient is a 79 y.o. male who is followed in the office by Dr Ramos. Repeat echocardiogram showed a reduced LV EF. He denies any chest pain or discomfort. He denies any notable dyspnea. LHC was planned. He underwent cardiac catheterization today that showed severe multivessel disease with critical RCA disease. His mother  of an MI in her 80s. He is current smoker and states he enjoys smoking and has no plans to quit.       Cardiac risk factors are as follows:  Diabetes  Hypertension and Tobacco Use    Diabetes Control:   Oral Medications    Primary symptom for surgery:  stable angina  Most recent EF_35-40_%  Acute   Prior cardiac arrhythmia  none      No history of stroke, TIA, prior cardiac surgery, prior vascular surgery, anesthetic complication, lung disease, DVT or PE, GI bleeding       Past Medical History:   Diagnosis Date    CAD (coronary artery disease)     non-obstructive, Dr Ramos-  denies hx of mi, never had heart cath    Chronic airway obstruction, not elsewhere classified 2022    symbicort inh- very seldom, does not require supplemental oxygen- smokes 0.75- ppd since age 13    Erectile dysfunction     Essential hypertension, benign 2015    H/O echocardiogram 10/15/2021    LVEF 60-65%    History of colonic polyps     Inflammatory and toxic neuropathy, unspecified 2015    Kidney disease     Kidney stone     Neoplasm of uncertain behavior of skin     Neuralgia, neuritis, and radiculitis, unspecified     Neuropathy of both feet     Osteoarthritis     Other and unspecified hyperlipidemia 2015    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

## 2025-05-08 NOTE — PROGRESS NOTES
Report received from [facility] RN. Procedural finding communicated. Intra procedural medication administration reviewed. Progression of care discussed.    Patient received into CPRU room 6, Post C    Access site without bleeding or swelling. C    Patient instructed to limit movement of right upper extremity.    Routine post procedural vital signs & site assessment initiated.

## 2025-05-09 LAB — ACT BLD: 613 SECS (ref 70–128)

## 2025-05-13 ENCOUNTER — OFFICE VISIT (OUTPATIENT)
Dept: CARDIOTHORACIC SURGERY | Age: 80
End: 2025-05-13

## 2025-05-13 VITALS
DIASTOLIC BLOOD PRESSURE: 72 MMHG | HEART RATE: 90 BPM | SYSTOLIC BLOOD PRESSURE: 107 MMHG | HEIGHT: 70 IN | OXYGEN SATURATION: 94 % | BODY MASS INDEX: 29.72 KG/M2 | WEIGHT: 207.6 LBS

## 2025-05-13 DIAGNOSIS — I25.10 CAD IN NATIVE ARTERY: Primary | ICD-10-CM

## 2025-05-13 NOTE — PROGRESS NOTES
CTS- he is here for re-talk for CABG, he is very concerned about perioperative stroke and renal failure, all questions answered, he clearly states he does not wish CABG, he sees Dr. Ramos 5/20

## 2025-05-14 ENCOUNTER — OFFICE VISIT (OUTPATIENT)
Dept: ORTHOPEDIC SURGERY | Age: 80
End: 2025-05-14

## 2025-05-14 DIAGNOSIS — M19.012 ARTHRITIS OF LEFT SHOULDER: ICD-10-CM

## 2025-05-14 DIAGNOSIS — M25.512 LEFT SHOULDER PAIN, UNSPECIFIED CHRONICITY: Primary | ICD-10-CM

## 2025-05-14 RX ORDER — TRIAMCINOLONE ACETONIDE 40 MG/ML
40 INJECTION, SUSPENSION INTRA-ARTICULAR; INTRAMUSCULAR ONCE
Status: COMPLETED | OUTPATIENT
Start: 2025-05-14 | End: 2025-05-14

## 2025-05-14 RX ADMIN — TRIAMCINOLONE ACETONIDE 40 MG: 40 INJECTION, SUSPENSION INTRA-ARTICULAR; INTRAMUSCULAR at 14:48

## 2025-05-20 ENCOUNTER — OFFICE VISIT (OUTPATIENT)
Age: 80
End: 2025-05-20
Payer: MEDICARE

## 2025-05-20 VITALS
WEIGHT: 197 LBS | DIASTOLIC BLOOD PRESSURE: 60 MMHG | BODY MASS INDEX: 28.2 KG/M2 | SYSTOLIC BLOOD PRESSURE: 118 MMHG | HEIGHT: 70 IN | HEART RATE: 70 BPM

## 2025-05-20 DIAGNOSIS — E11.42 TYPE 2 DIABETES MELLITUS WITH DIABETIC POLYNEUROPATHY, WITHOUT LONG-TERM CURRENT USE OF INSULIN (HCC): ICD-10-CM

## 2025-05-20 DIAGNOSIS — I10 PRIMARY HYPERTENSION: ICD-10-CM

## 2025-05-20 DIAGNOSIS — E78.2 MIXED HYPERLIPIDEMIA: ICD-10-CM

## 2025-05-20 DIAGNOSIS — I65.23 CAROTID ATHEROSCLEROSIS, BILATERAL: ICD-10-CM

## 2025-05-20 DIAGNOSIS — J44.9 CHRONIC OBSTRUCTIVE PULMONARY DISEASE, UNSPECIFIED COPD TYPE (HCC): ICD-10-CM

## 2025-05-20 DIAGNOSIS — I50.22 CHRONIC SYSTOLIC CONGESTIVE HEART FAILURE (HCC): Primary | ICD-10-CM

## 2025-05-20 PROCEDURE — 1123F ACP DISCUSS/DSCN MKR DOCD: CPT | Performed by: INTERNAL MEDICINE

## 2025-05-20 PROCEDURE — 3078F DIAST BP <80 MM HG: CPT | Performed by: INTERNAL MEDICINE

## 2025-05-20 PROCEDURE — 3023F SPIROM DOC REV: CPT | Performed by: INTERNAL MEDICINE

## 2025-05-20 PROCEDURE — 1160F RVW MEDS BY RX/DR IN RCRD: CPT | Performed by: INTERNAL MEDICINE

## 2025-05-20 PROCEDURE — G8427 DOCREV CUR MEDS BY ELIG CLIN: HCPCS | Performed by: INTERNAL MEDICINE

## 2025-05-20 PROCEDURE — 1159F MED LIST DOCD IN RCRD: CPT | Performed by: INTERNAL MEDICINE

## 2025-05-20 PROCEDURE — 99215 OFFICE O/P EST HI 40 MIN: CPT | Performed by: INTERNAL MEDICINE

## 2025-05-20 PROCEDURE — 93000 ELECTROCARDIOGRAM COMPLETE: CPT | Performed by: INTERNAL MEDICINE

## 2025-05-20 PROCEDURE — G8417 CALC BMI ABV UP PARAM F/U: HCPCS | Performed by: INTERNAL MEDICINE

## 2025-05-20 PROCEDURE — 1126F AMNT PAIN NOTED NONE PRSNT: CPT | Performed by: INTERNAL MEDICINE

## 2025-05-20 PROCEDURE — 4004F PT TOBACCO SCREEN RCVD TLK: CPT | Performed by: INTERNAL MEDICINE

## 2025-05-20 PROCEDURE — 3074F SYST BP LT 130 MM HG: CPT | Performed by: INTERNAL MEDICINE

## 2025-05-20 PROCEDURE — 3051F HG A1C>EQUAL 7.0%<8.0%: CPT | Performed by: INTERNAL MEDICINE

## 2025-05-20 ASSESSMENT — ENCOUNTER SYMPTOMS
SORE THROAT: 0
SHORTNESS OF BREATH: 1
ABDOMINAL DISTENTION: 0
PHOTOPHOBIA: 0
CONSTIPATION: 0
DIARRHEA: 0
ABDOMINAL PAIN: 0
COUGH: 1

## 2025-05-20 NOTE — PROGRESS NOTES
[address], [address]  [place] [address]  PHONE: [phone]        NAME:  Humphrey Villalpando  : 1945  MRN: [id number]     PCP:  David Mitchell MD      SUBJECTIVE:   Humphrey Villalpando is a 79 y.o. male seen for a follow up visit regarding the following:     Chief Complaint   Patient presents with    Follow-Up from Hospital     Post cath     Atherosclerotic heart disease of native coronary artery wit       HPI:     Previously followed by [facility].  Known CAD s/p cath  at [facility] showing mild non-obstructive CAD. He has chronic SOB and MOONEY with COPD but started smoking again in the past few years, still smoking ~10 cigs per day, but will continue to try to decrease usage (started at 13yo).  Doing well recently without interval angina, CHF, palpitations, edema.  Carotids showed less than 50% bilateral internal carotid disease with a left vertebral stenosis. CT of the brain was negative.  Echo as noted below.  Previous postural hypotension resolved now, hydrating as tolerated.  He is encouraged to drink at least 6 to 8 glasses of fluid daily and to minimize alcohol and caffeine use.        Echo 10/2021:     Left Ventricle  Normal cavity size, wall thickness and systolic function (ejection fraction normal). Wall motion: normal. The estimated EF is 60  - 65%. There is mild (grade 1) left ventricular diastolic dysfunction E/e'=10.     Left Atrium  Normal cavity size. Left Atrium volume index is 21 mL/m2.     Interatrial Septum  No interatrial shunt visualized on color doppler.     Right Ventricle  Normal cavity size and global systolic function.     Right Atrium  Normal cavity size.     Aortic Valve  Trileaflet valve structure, no stenosis and no regurgitation. Mild aortic valve sclerosis. Normal aortic leaflet mobility.     Mitral Valve  Normal valve structure, no stenosis and no regurgitation.     Tricuspid Valve  Normal valve structure, no

## 2025-05-29 ENCOUNTER — PREP FOR PROCEDURE (OUTPATIENT)
Dept: CARDIOTHORACIC SURGERY | Age: 80
End: 2025-05-29

## 2025-05-29 DIAGNOSIS — I50.22 CHRONIC SYSTOLIC HEART FAILURE (HCC): ICD-10-CM

## 2025-05-29 PROBLEM — I25.110 ATHEROSCLEROTIC HEART DISEASE OF NATIVE CORONARY ARTERY WITH UNSTABLE ANGINA PECTORIS (HCC): Status: ACTIVE | Noted: 2025-05-29

## 2025-05-30 ENCOUNTER — PATIENT MESSAGE (OUTPATIENT)
Age: 80
End: 2025-05-30

## 2025-06-03 NOTE — TELEPHONE ENCOUNTER
Response to my chart message,  \"Good luck with the surgery. You are definitely in good hands. If there is anything we can do please let us know.\"

## 2025-06-06 ENCOUNTER — PREP FOR PROCEDURE (OUTPATIENT)
Dept: CARDIOTHORACIC SURGERY | Age: 80
End: 2025-06-06

## 2025-06-06 DIAGNOSIS — Z01.818 PRE-OP TESTING: Primary | ICD-10-CM

## 2025-06-06 RX ORDER — SODIUM CHLORIDE 0.9 % (FLUSH) 0.9 %
5-40 SYRINGE (ML) INJECTION EVERY 12 HOURS SCHEDULED
Status: CANCELLED | OUTPATIENT
Start: 2025-06-06

## 2025-06-06 RX ORDER — SODIUM CHLORIDE 0.9 % (FLUSH) 0.9 %
5-40 SYRINGE (ML) INJECTION PRN
Status: CANCELLED | OUTPATIENT
Start: 2025-06-06

## 2025-06-06 RX ORDER — SODIUM CHLORIDE 9 MG/ML
INJECTION, SOLUTION INTRAVENOUS PRN
Status: CANCELLED | OUTPATIENT
Start: 2025-06-06

## 2025-06-06 RX ORDER — AMIODARONE HYDROCHLORIDE 200 MG/1
600 TABLET ORAL ONCE
Qty: 3 TABLET | Refills: 0 | Status: SHIPPED | OUTPATIENT
Start: 2025-06-06 | End: 2025-06-06

## 2025-06-06 RX ORDER — AMIODARONE HYDROCHLORIDE 200 MG/1
600 TABLET ORAL ONCE
Status: CANCELLED | OUTPATIENT
Start: 2025-06-06 | End: 2025-06-06

## 2025-06-09 ENCOUNTER — HOSPITAL ENCOUNTER (OUTPATIENT)
Dept: SURGERY | Age: 80
Discharge: HOME | End: 2025-06-12
Payer: MEDICARE

## 2025-06-09 ENCOUNTER — ANESTHESIA EVENT (OUTPATIENT)
Dept: SURGERY | Age: 80
End: 2025-06-09
Payer: MEDICARE

## 2025-06-09 ENCOUNTER — HOSPITAL ENCOUNTER (OUTPATIENT)
Dept: LAB | Age: 80
Setting detail: SPECIMEN
Discharge: HOME | End: 2025-06-11
Payer: MEDICARE

## 2025-06-09 ENCOUNTER — HOSPITAL ENCOUNTER (OUTPATIENT)
Dept: GENERAL RADIOLOGY | Age: 80
Discharge: HOME | End: 2025-06-12
Payer: MEDICARE

## 2025-06-09 VITALS
DIASTOLIC BLOOD PRESSURE: 86 MMHG | RESPIRATION RATE: 18 BRPM | WEIGHT: 208.2 LBS | TEMPERATURE: 97.3 F | BODY MASS INDEX: 30.84 KG/M2 | HEIGHT: 69 IN | SYSTOLIC BLOOD PRESSURE: 118 MMHG | HEART RATE: 82 BPM | OXYGEN SATURATION: 90 %

## 2025-06-09 DIAGNOSIS — Z01.818 PRE-OP TESTING: ICD-10-CM

## 2025-06-09 LAB
ALBUMIN SERPL-MCNC: 3.1 G/DL (ref 3.2–4.6)
ALBUMIN/GLOB SERPL: 1 (ref 1–1.9)
ALP SERPL-CCNC: 159 U/L (ref 40–129)
ALT SERPL-CCNC: 25 U/L (ref 8–55)
ANION GAP SERPL CALC-SCNC: 12 MMOL/L (ref 7–16)
APPEARANCE UR: CLEAR
APTT PPP: 25.6 SEC (ref 23.3–37.4)
AST SERPL-CCNC: 26 U/L (ref 15–37)
BILIRUB SERPL-MCNC: 0.5 MG/DL (ref 0–1.2)
BILIRUB UR QL: NEGATIVE
BUN SERPL-MCNC: 42 MG/DL (ref 8–23)
CALCIUM SERPL-MCNC: 9 MG/DL (ref 8.8–10.2)
CHLORIDE SERPL-SCNC: 103 MMOL/L (ref 98–107)
CO2 SERPL-SCNC: 23 MMOL/L (ref 20–29)
COLOR UR: ABNORMAL
CREAT SERPL-MCNC: 2.24 MG/DL (ref 0.8–1.3)
ERYTHROCYTE [DISTWIDTH] IN BLOOD BY AUTOMATED COUNT: 13.5 % (ref 11.9–14.6)
EST. AVERAGE GLUCOSE BLD GHB EST-MCNC: 167 MG/DL
GLOBULIN SER CALC-MCNC: 3.2 G/DL (ref 2.3–3.5)
GLUCOSE SERPL-MCNC: 106 MG/DL (ref 70–99)
GLUCOSE UR STRIP.AUTO-MCNC: >1000 MG/DL
HBA1C MFR BLD: 7.4 % (ref 0–5.6)
HCT VFR BLD AUTO: 46.8 % (ref 41.1–50.3)
HGB BLD-MCNC: 15.1 G/DL (ref 13.6–17.2)
HGB UR QL STRIP: NEGATIVE
INR PPP: 1
KETONES UR QL STRIP.AUTO: NEGATIVE MG/DL
LEUKOCYTE ESTERASE UR QL STRIP.AUTO: NEGATIVE
MAGNESIUM SERPL-MCNC: 2.4 MG/DL (ref 1.8–2.4)
MCH RBC QN AUTO: 31.9 PG (ref 26.1–32.9)
MCHC RBC AUTO-ENTMCNC: 32.3 G/DL (ref 31.4–35)
MCV RBC AUTO: 98.7 FL (ref 82–102)
MRSA DNA SPEC QL NAA+PROBE: NOT DETECTED
NITRITE UR QL STRIP.AUTO: NEGATIVE
NRBC # BLD: 0 K/UL (ref 0–0.2)
PH UR STRIP: 6 (ref 5–9)
PLATELET # BLD AUTO: 193 K/UL (ref 150–450)
PMV BLD AUTO: 11 FL (ref 9.4–12.3)
POTASSIUM SERPL-SCNC: 4.4 MMOL/L (ref 3.5–5.1)
PROT SERPL-MCNC: 6.3 G/DL (ref 6.3–8.2)
PROT UR STRIP-MCNC: NEGATIVE MG/DL
PROTHROMBIN TIME: 13.3 SEC (ref 11.3–14.9)
RBC # BLD AUTO: 4.74 M/UL (ref 4.23–5.6)
S AUREUS CPE NOSE QL NAA+PROBE: NOT DETECTED
SODIUM SERPL-SCNC: 138 MMOL/L (ref 136–145)
SP GR UR REFRACTOMETRY: 1.02 (ref 1–1.02)
UROBILINOGEN UR QL STRIP.AUTO: 1 EU/DL (ref 0.2–1)
WBC # BLD AUTO: 7.2 K/UL (ref 4.3–11.1)

## 2025-06-09 PROCEDURE — 80053 COMPREHEN METABOLIC PANEL: CPT

## 2025-06-09 PROCEDURE — 83735 ASSAY OF MAGNESIUM: CPT

## 2025-06-09 PROCEDURE — 85027 COMPLETE CBC AUTOMATED: CPT

## 2025-06-09 PROCEDURE — 86920 COMPATIBILITY TEST SPIN: CPT

## 2025-06-09 PROCEDURE — 83036 HEMOGLOBIN GLYCOSYLATED A1C: CPT

## 2025-06-09 PROCEDURE — 87641 MR-STAPH DNA AMP PROBE: CPT

## 2025-06-09 PROCEDURE — 94010 BREATHING CAPACITY TEST: CPT

## 2025-06-09 PROCEDURE — 85610 PROTHROMBIN TIME: CPT

## 2025-06-09 PROCEDURE — 86850 RBC ANTIBODY SCREEN: CPT

## 2025-06-09 PROCEDURE — 86901 BLOOD TYPING SEROLOGIC RH(D): CPT

## 2025-06-09 PROCEDURE — 81003 URINALYSIS AUTO W/O SCOPE: CPT

## 2025-06-09 PROCEDURE — 85730 THROMBOPLASTIN TIME PARTIAL: CPT

## 2025-06-09 PROCEDURE — 86900 BLOOD TYPING SEROLOGIC ABO: CPT

## 2025-06-09 PROCEDURE — 71046 X-RAY EXAM CHEST 2 VIEWS: CPT

## 2025-06-09 NOTE — PRE-PROCEDURE INSTRUCTIONS
PLEASE CONTINUE TAKING ALL PRESCRIPTION MEDICATIONS UP TO THE DAY OF SURGERY UNLESS OTHERWISE DIRECTED BELOW. You may take Tylenol, allergy,  and/or indigestion medications.     TAKE ONLY THESE MEDICATIONS ON THE DAY OF SURGERY   TAKE AND BRING ALBUTEROL INHALER MORNING OF SURGERY  SYMBICORT INHALER    GABAPENTIN  DULOXETINE   JANUVIA  METOPROLOL  TAMSULOSIN       DISCONTINUE all vitamins and supplements prior to surgery. DISCONTINUE Non-Steroidal Anti-Inflammatory (NSAIDS) such as Advil and Aleve 5 days prior to surgery.     Home Medications to Hold- please continue all other medications except these.    HOLD JARDIANCE UNTIL AFTER SURGERY  HOLD GLIPIZIDE MORNING OF SURGERY     HOLD LASIX MORNING OF SURGERY     Comments   AMIODARONE 600MG (3 TABS) AFTER 4PM THE NIGHT PRIOR TO SURGERY     On the day before surgery please take 2 Tylenol in the morning and then again before bed. You may use either regular or extra strength. 06/10/2025          Please do not bring home medications with you on the day of surgery unless otherwise directed by your nurse.  If you are instructed to bring home medications, please give them to your nurse as they will be administered by the nursing staff.    If you have any questions, please call [facility] [phone].    A copy of this note was provided to the patient for reference.

## 2025-06-10 LAB — HISTORY CHECK: NORMAL

## 2025-06-11 ENCOUNTER — APPOINTMENT (OUTPATIENT)
Dept: GENERAL RADIOLOGY | Age: 80
DRG: 235 | End: 2025-06-11
Attending: THORACIC SURGERY (CARDIOTHORACIC VASCULAR SURGERY)
Payer: MEDICARE

## 2025-06-11 ENCOUNTER — ANESTHESIA (OUTPATIENT)
Dept: SURGERY | Age: 80
End: 2025-06-11
Payer: MEDICARE

## 2025-06-11 ENCOUNTER — HOSPITAL ENCOUNTER (INPATIENT)
Age: 80
LOS: 6 days | Discharge: HOME HEALTH | DRG: 235 | End: 2025-06-17
Attending: THORACIC SURGERY (CARDIOTHORACIC VASCULAR SURGERY) | Admitting: THORACIC SURGERY (CARDIOTHORACIC VASCULAR SURGERY)
Payer: MEDICARE

## 2025-06-11 DIAGNOSIS — Z95.1 S/P CABG X 3: Primary | ICD-10-CM

## 2025-06-11 DIAGNOSIS — Z01.818 PRE-OP TESTING: ICD-10-CM

## 2025-06-11 PROBLEM — Z72.0 TOBACCO ABUSE: Chronic | Status: ACTIVE | Noted: 2017-05-25

## 2025-06-11 PROBLEM — I25.10 CAD, MULTIPLE VESSEL: Chronic | Status: ACTIVE | Noted: 2025-06-11

## 2025-06-11 PROBLEM — N18.9 CHRONIC RENAL FAILURE: Chronic | Status: ACTIVE | Noted: 2025-06-11

## 2025-06-11 PROBLEM — I25.10 CAD, MULTIPLE VESSEL: Status: ACTIVE | Noted: 2025-06-11

## 2025-06-11 PROBLEM — I50.22 CHRONIC SYSTOLIC HEART FAILURE (HCC): Chronic | Status: ACTIVE | Noted: 2025-05-29

## 2025-06-11 PROBLEM — J98.11 ATELECTASIS: Status: ACTIVE | Noted: 2025-06-11

## 2025-06-11 LAB
ANION GAP BLD CALC-SCNC: ABNORMAL MMOL/L
ANION GAP SERPL CALC-SCNC: 10 MMOL/L (ref 7–16)
ANION GAP SERPL CALC-SCNC: 11 MMOL/L (ref 7–16)
APTT PPP: 30.1 SEC (ref 23.3–37.4)
BASE DEFICIT BLD-SCNC: 0.3 MMOL/L
BASE DEFICIT BLD-SCNC: 0.5 MMOL/L
BASE DEFICIT BLD-SCNC: 0.7 MMOL/L
BASE DEFICIT BLD-SCNC: 2.4 MMOL/L
BASE DEFICIT BLD-SCNC: 4.7 MMOL/L
BASE DEFICIT BLD-SCNC: 5.6 MMOL/L
BDY SITE: ABNORMAL
BUN SERPL-MCNC: 34 MG/DL (ref 8–23)
BUN SERPL-MCNC: 35 MG/DL (ref 8–23)
CA-I BLD-MCNC: 1.1 MMOL/L (ref 1.12–1.32)
CA-I BLD-MCNC: 1.11 MMOL/L (ref 1.12–1.32)
CA-I BLD-MCNC: 1.17 MMOL/L (ref 1.12–1.32)
CA-I BLD-MCNC: 1.17 MMOL/L (ref 1.12–1.32)
CA-I BLD-MCNC: 1.35 MMOL/L (ref 1.12–1.32)
CALCIUM SERPL-MCNC: 8.6 MG/DL (ref 8.8–10.2)
CALCIUM SERPL-MCNC: 8.8 MG/DL (ref 8.8–10.2)
CHLORIDE SERPL-SCNC: 108 MMOL/L (ref 98–107)
CHLORIDE SERPL-SCNC: 110 MMOL/L (ref 98–107)
CO2 BLD-SCNC: 20 MMOL/L (ref 13–23)
CO2 BLD-SCNC: 20 MMOL/L (ref 13–23)
CO2 BLD-SCNC: 23 MMOL/L (ref 13–23)
CO2 BLD-SCNC: 24 MMOL/L (ref 13–23)
CO2 BLD-SCNC: 24 MMOL/L (ref 13–23)
CO2 SERPL-SCNC: 22 MMOL/L (ref 20–29)
CO2 SERPL-SCNC: 22 MMOL/L (ref 20–29)
CREAT SERPL-MCNC: 1.79 MG/DL (ref 0.8–1.3)
CREAT SERPL-MCNC: 1.94 MG/DL (ref 0.8–1.3)
EKG ATRIAL RATE: 73 BPM
EKG DIAGNOSIS: NORMAL
EKG P AXIS: 9 DEGREES
EKG P-R INTERVAL: 186 MS
EKG Q-T INTERVAL: 454 MS
EKG QRS DURATION: 96 MS
EKG QTC CALCULATION (BAZETT): 500 MS
EKG R AXIS: -9 DEGREES
EKG T AXIS: -12 DEGREES
EKG VENTRICULAR RATE: 73 BPM
ERYTHROCYTE [DISTWIDTH] IN BLOOD BY AUTOMATED COUNT: 13.6 % (ref 11.9–14.6)
ERYTHROCYTE [DISTWIDTH] IN BLOOD BY AUTOMATED COUNT: 13.8 % (ref 11.9–14.6)
FIBRINOGEN PPP-MCNC: 310 MG/DL (ref 190–501)
GAS FLOW.O2 O2 DELIVERY SYS: ABNORMAL
GLUCOSE BLD STRIP.AUTO-MCNC: 108 MG/DL (ref 65–100)
GLUCOSE BLD STRIP.AUTO-MCNC: 108 MG/DL (ref 65–100)
GLUCOSE BLD STRIP.AUTO-MCNC: 112 MG/DL (ref 65–100)
GLUCOSE BLD STRIP.AUTO-MCNC: 120 MG/DL (ref 65–100)
GLUCOSE BLD STRIP.AUTO-MCNC: 127 MG/DL (ref 65–100)
GLUCOSE BLD STRIP.AUTO-MCNC: 133 MG/DL (ref 65–100)
GLUCOSE BLD STRIP.AUTO-MCNC: 139 MG/DL (ref 65–100)
GLUCOSE BLD STRIP.AUTO-MCNC: 154 MG/DL (ref 65–100)
GLUCOSE BLD STRIP.AUTO-MCNC: 154 MG/DL (ref 65–100)
GLUCOSE BLD STRIP.AUTO-MCNC: 157 MG/DL (ref 65–100)
GLUCOSE BLD STRIP.AUTO-MCNC: 158 MG/DL (ref 65–100)
GLUCOSE BLD STRIP.AUTO-MCNC: 168 MG/DL (ref 65–100)
GLUCOSE BLD STRIP.AUTO-MCNC: 168 MG/DL (ref 65–100)
GLUCOSE BLD STRIP.AUTO-MCNC: 89 MG/DL (ref 65–100)
GLUCOSE BLD STRIP.AUTO-MCNC: 91 MG/DL (ref 65–100)
GLUCOSE SERPL-MCNC: 112 MG/DL (ref 70–99)
GLUCOSE SERPL-MCNC: 94 MG/DL (ref 70–99)
HCO3 BLD-SCNC: 20.3 MMOL/L (ref 21–28)
HCO3 BLD-SCNC: 21.2 MMOL/L (ref 21–28)
HCO3 BLD-SCNC: 23.4 MMOL/L (ref 21–28)
HCO3 BLD-SCNC: 24.8 MMOL/L (ref 21–28)
HCO3 BLD-SCNC: 24.9 MMOL/L (ref 21–28)
HCO3 BLD-SCNC: 25.1 MMOL/L (ref 21–28)
HCT VFR BLD AUTO: 35.4 % (ref 41.1–50.3)
HCT VFR BLD AUTO: 41.4 % (ref 41.1–50.3)
HCT VFR BLD AUTO: 42.6 % (ref 41.1–50.3)
HGB BLD-MCNC: 11.8 G/DL (ref 13.6–17.2)
HGB BLD-MCNC: 13.4 G/DL (ref 13.6–17.2)
HGB BLD-MCNC: 13.7 G/DL (ref 13.6–17.2)
INR PPP: 1.3
MAGNESIUM SERPL-MCNC: 3 MG/DL (ref 1.8–2.4)
MAGNESIUM SERPL-MCNC: 3.2 MG/DL (ref 1.8–2.4)
MAGNESIUM SERPL-MCNC: 3.6 MG/DL (ref 1.8–2.4)
MCH RBC QN AUTO: 31.9 PG (ref 26.1–32.9)
MCH RBC QN AUTO: 32.8 PG (ref 26.1–32.9)
MCHC RBC AUTO-ENTMCNC: 31.5 G/DL (ref 31.4–35)
MCHC RBC AUTO-ENTMCNC: 33.3 G/DL (ref 31.4–35)
MCV RBC AUTO: 101.4 FL (ref 82–102)
MCV RBC AUTO: 98.3 FL (ref 82–102)
NRBC # BLD: 0 K/UL (ref 0–0.2)
NRBC # BLD: 0 K/UL (ref 0–0.2)
O2/TOTAL GAS SETTING VFR VENT: 60 %
PCO2 BLD: 40.2 MMHG (ref 35–45)
PCO2 BLD: 41.8 MMHG (ref 35–45)
PCO2 BLD: 41.8 MMHG (ref 35–45)
PCO2 BLD: 42.1 MMHG (ref 35–45)
PCO2 BLD: 43.2 MMHG (ref 35–45)
PCO2 BLD: 45.3 MMHG (ref 35–45)
PEEP RESPIRATORY: 8 CMH2O
PH BLD: 7.31 (ref 7.35–7.45)
PH BLD: 7.31 (ref 7.35–7.45)
PH BLD: 7.34 (ref 7.35–7.45)
PH BLD: 7.35 (ref 7.35–7.45)
PH BLD: 7.38 (ref 7.35–7.45)
PH BLD: 7.38 (ref 7.35–7.45)
PLATELET # BLD AUTO: 136 K/UL (ref 150–450)
PLATELET # BLD AUTO: 156 K/UL (ref 150–450)
PMV BLD AUTO: 10.8 FL (ref 9.4–12.3)
PMV BLD AUTO: 10.9 FL (ref 9.4–12.3)
PO2 BLD: 203 MMHG (ref 75–100)
PO2 BLD: 213 MMHG (ref 75–100)
PO2 BLD: 257 MMHG (ref 75–100)
PO2 BLD: 379 MMHG (ref 75–100)
PO2 BLD: 466 MMHG (ref 75–100)
PO2 BLD: 96 MMHG (ref 75–100)
POTASSIUM BLD-SCNC: 4 MMOL/L (ref 3.5–5.1)
POTASSIUM BLD-SCNC: 4.3 MMOL/L (ref 3.5–5.1)
POTASSIUM BLD-SCNC: 4.4 MMOL/L (ref 3.5–5.1)
POTASSIUM BLD-SCNC: 4.6 MMOL/L (ref 3.5–5.1)
POTASSIUM BLD-SCNC: 4.7 MMOL/L (ref 3.5–5.1)
POTASSIUM BLD-SCNC: 5.4 MMOL/L (ref 3.5–5.1)
POTASSIUM SERPL-SCNC: 4.1 MMOL/L (ref 3.5–5.1)
POTASSIUM SERPL-SCNC: 4.8 MMOL/L (ref 3.5–5.1)
POTASSIUM SERPL-SCNC: 4.9 MMOL/L (ref 3.5–5.1)
PRESSURE SUPPORT SETTING VENT: 12 CMH2O
PROTHROMBIN TIME: 16.5 SEC (ref 11.3–14.9)
RBC # BLD AUTO: 3.6 M/UL (ref 4.23–5.6)
RBC # BLD AUTO: 4.2 M/UL (ref 4.23–5.6)
RESPIRATORY RATE, POC: 18 (ref 5–40)
SAO2 % BLD: 100 %
SAO2 % BLD: 97 % (ref 94–98)
SERVICE CMNT-IMP: ABNORMAL
SERVICE CMNT-IMP: NORMAL
SERVICE CMNT-IMP: NORMAL
SODIUM BLD-SCNC: 140 MMOL/L (ref 136–145)
SODIUM BLD-SCNC: 140 MMOL/L (ref 136–145)
SODIUM BLD-SCNC: 143 MMOL/L (ref 136–145)
SODIUM BLD-SCNC: 143 MMOL/L (ref 136–145)
SODIUM BLD-SCNC: 145 MMOL/L (ref 136–145)
SODIUM SERPL-SCNC: 141 MMOL/L (ref 136–145)
SODIUM SERPL-SCNC: 142 MMOL/L (ref 136–145)
SPECIMEN SITE: ABNORMAL
SPECIMEN TYPE: ABNORMAL
VENTILATION MODE VENT: ABNORMAL
VT SETTING VENT: 500 ML
WBC # BLD AUTO: 11 K/UL (ref 4.3–11.1)
WBC # BLD AUTO: 15.4 K/UL (ref 4.3–11.1)

## 2025-06-11 PROCEDURE — 2580000003 HC RX 258: Performed by: ANESTHESIOLOGY

## 2025-06-11 PROCEDURE — 6370000000 HC RX 637 (ALT 250 FOR IP): Performed by: PHYSICIAN ASSISTANT

## 2025-06-11 PROCEDURE — 2720000010 HC SURG SUPPLY STERILE: Performed by: THORACIC SURGERY (CARDIOTHORACIC VASCULAR SURGERY)

## 2025-06-11 PROCEDURE — 2580000003 HC RX 258: Performed by: THORACIC SURGERY (CARDIOTHORACIC VASCULAR SURGERY)

## 2025-06-11 PROCEDURE — 85014 HEMATOCRIT: CPT

## 2025-06-11 PROCEDURE — 93010 ELECTROCARDIOGRAM REPORT: CPT | Performed by: INTERNAL MEDICINE

## 2025-06-11 PROCEDURE — 2100000001 HC CVICU R&B

## 2025-06-11 PROCEDURE — 2580000003 HC RX 258: Performed by: NURSE ANESTHETIST, CERTIFIED REGISTERED

## 2025-06-11 PROCEDURE — 82947 ASSAY GLUCOSE BLOOD QUANT: CPT

## 2025-06-11 PROCEDURE — 36592 COLLECT BLOOD FROM PICC: CPT

## 2025-06-11 PROCEDURE — 85018 HEMOGLOBIN: CPT

## 2025-06-11 PROCEDURE — 6360000002 HC RX W HCPCS: Performed by: NURSE ANESTHETIST, CERTIFIED REGISTERED

## 2025-06-11 PROCEDURE — 85610 PROTHROMBIN TIME: CPT

## 2025-06-11 PROCEDURE — B246ZZ4 ULTRASONOGRAPHY OF RIGHT AND LEFT HEART, TRANSESOPHAGEAL: ICD-10-PCS | Performed by: ANESTHESIOLOGY

## 2025-06-11 PROCEDURE — 6360000002 HC RX W HCPCS: Performed by: THORACIC SURGERY (CARDIOTHORACIC VASCULAR SURGERY)

## 2025-06-11 PROCEDURE — 6370000000 HC RX 637 (ALT 250 FOR IP): Performed by: THORACIC SURGERY (CARDIOTHORACIC VASCULAR SURGERY)

## 2025-06-11 PROCEDURE — 99223 1ST HOSP IP/OBS HIGH 75: CPT | Performed by: STUDENT IN AN ORGANIZED HEALTH CARE EDUCATION/TRAINING PROGRAM

## 2025-06-11 PROCEDURE — 71045 X-RAY EXAM CHEST 1 VIEW: CPT

## 2025-06-11 PROCEDURE — 2500000003 HC RX 250 WO HCPCS: Performed by: PHYSICIAN ASSISTANT

## 2025-06-11 PROCEDURE — 3700000001 HC ADD 15 MINUTES (ANESTHESIA): Performed by: THORACIC SURGERY (CARDIOTHORACIC VASCULAR SURGERY)

## 2025-06-11 PROCEDURE — 2500000003 HC RX 250 WO HCPCS

## 2025-06-11 PROCEDURE — 85027 COMPLETE CBC AUTOMATED: CPT

## 2025-06-11 PROCEDURE — 82962 GLUCOSE BLOOD TEST: CPT

## 2025-06-11 PROCEDURE — 3600000008 HC SURGERY OHS BASE: Performed by: THORACIC SURGERY (CARDIOTHORACIC VASCULAR SURGERY)

## 2025-06-11 PROCEDURE — P9047 ALBUMIN (HUMAN), 25%, 50ML: HCPCS

## 2025-06-11 PROCEDURE — 2709999900 HC NON-CHARGEABLE SUPPLY: Performed by: THORACIC SURGERY (CARDIOTHORACIC VASCULAR SURGERY)

## 2025-06-11 PROCEDURE — 3600000018 HC SURGERY OHS ADDTL 15MIN: Performed by: THORACIC SURGERY (CARDIOTHORACIC VASCULAR SURGERY)

## 2025-06-11 PROCEDURE — 2580000003 HC RX 258: Performed by: PHYSICIAN ASSISTANT

## 2025-06-11 PROCEDURE — 82803 BLOOD GASES ANY COMBINATION: CPT

## 2025-06-11 PROCEDURE — 021109W BYPASS CORONARY ARTERY, TWO ARTERIES FROM AORTA WITH AUTOLOGOUS VENOUS TISSUE, OPEN APPROACH: ICD-10-PCS | Performed by: THORACIC SURGERY (CARDIOTHORACIC VASCULAR SURGERY)

## 2025-06-11 PROCEDURE — 6360000002 HC RX W HCPCS: Performed by: STUDENT IN AN ORGANIZED HEALTH CARE EDUCATION/TRAINING PROGRAM

## 2025-06-11 PROCEDURE — 84132 ASSAY OF SERUM POTASSIUM: CPT

## 2025-06-11 PROCEDURE — 03HY32Z INSERTION OF MONITORING DEVICE INTO UPPER ARTERY, PERCUTANEOUS APPROACH: ICD-10-PCS | Performed by: NURSE ANESTHETIST, CERTIFIED REGISTERED

## 2025-06-11 PROCEDURE — P9045 ALBUMIN (HUMAN), 5%, 250 ML: HCPCS | Performed by: THORACIC SURGERY (CARDIOTHORACIC VASCULAR SURGERY)

## 2025-06-11 PROCEDURE — C1729 CATH, DRAINAGE: HCPCS | Performed by: THORACIC SURGERY (CARDIOTHORACIC VASCULAR SURGERY)

## 2025-06-11 PROCEDURE — 02100Z9 BYPASS CORONARY ARTERY, ONE ARTERY FROM LEFT INTERNAL MAMMARY, OPEN APPROACH: ICD-10-PCS | Performed by: THORACIC SURGERY (CARDIOTHORACIC VASCULAR SURGERY)

## 2025-06-11 PROCEDURE — 0BH17EZ INSERTION OF ENDOTRACHEAL AIRWAY INTO TRACHEA, VIA NATURAL OR ARTIFICIAL OPENING: ICD-10-PCS | Performed by: ANESTHESIOLOGY

## 2025-06-11 PROCEDURE — 2700000000 HC OXYGEN THERAPY PER DAY

## 2025-06-11 PROCEDURE — 2500000003 HC RX 250 WO HCPCS: Performed by: NURSE ANESTHETIST, CERTIFIED REGISTERED

## 2025-06-11 PROCEDURE — 2580000003 HC RX 258

## 2025-06-11 PROCEDURE — 6370000000 HC RX 637 (ALT 250 FOR IP): Performed by: NURSE ANESTHETIST, CERTIFIED REGISTERED

## 2025-06-11 PROCEDURE — C1769 GUIDE WIRE: HCPCS | Performed by: THORACIC SURGERY (CARDIOTHORACIC VASCULAR SURGERY)

## 2025-06-11 PROCEDURE — C1751 CATH, INF, PER/CENT/MIDLINE: HCPCS | Performed by: THORACIC SURGERY (CARDIOTHORACIC VASCULAR SURGERY)

## 2025-06-11 PROCEDURE — 83735 ASSAY OF MAGNESIUM: CPT

## 2025-06-11 PROCEDURE — 93005 ELECTROCARDIOGRAM TRACING: CPT | Performed by: PHYSICIAN ASSISTANT

## 2025-06-11 PROCEDURE — 80048 BASIC METABOLIC PNL TOTAL CA: CPT

## 2025-06-11 PROCEDURE — 2500000003 HC RX 250 WO HCPCS: Performed by: THORACIC SURGERY (CARDIOTHORACIC VASCULAR SURGERY)

## 2025-06-11 PROCEDURE — 94640 AIRWAY INHALATION TREATMENT: CPT

## 2025-06-11 PROCEDURE — 6360000002 HC RX W HCPCS

## 2025-06-11 PROCEDURE — 02HV33Z INSERTION OF INFUSION DEVICE INTO SUPERIOR VENA CAVA, PERCUTANEOUS APPROACH: ICD-10-PCS | Performed by: ANESTHESIOLOGY

## 2025-06-11 PROCEDURE — 37799 UNLISTED PX VASCULAR SURGERY: CPT

## 2025-06-11 PROCEDURE — 94002 VENT MGMT INPAT INIT DAY: CPT

## 2025-06-11 PROCEDURE — 85384 FIBRINOGEN ACTIVITY: CPT

## 2025-06-11 PROCEDURE — 3700000000 HC ANESTHESIA ATTENDED CARE: Performed by: THORACIC SURGERY (CARDIOTHORACIC VASCULAR SURGERY)

## 2025-06-11 PROCEDURE — P9045 ALBUMIN (HUMAN), 5%, 250 ML: HCPCS | Performed by: NURSE ANESTHETIST, CERTIFIED REGISTERED

## 2025-06-11 PROCEDURE — C1713 ANCHOR/SCREW BN/BN,TIS/BN: HCPCS | Performed by: THORACIC SURGERY (CARDIOTHORACIC VASCULAR SURGERY)

## 2025-06-11 PROCEDURE — 84295 ASSAY OF SERUM SODIUM: CPT

## 2025-06-11 PROCEDURE — 5A1221Z PERFORMANCE OF CARDIAC OUTPUT, CONTINUOUS: ICD-10-PCS | Performed by: THORACIC SURGERY (CARDIOTHORACIC VASCULAR SURGERY)

## 2025-06-11 PROCEDURE — 82330 ASSAY OF CALCIUM: CPT

## 2025-06-11 PROCEDURE — 06BQ4ZZ EXCISION OF LEFT SAPHENOUS VEIN, PERCUTANEOUS ENDOSCOPIC APPROACH: ICD-10-PCS | Performed by: THORACIC SURGERY (CARDIOTHORACIC VASCULAR SURGERY)

## 2025-06-11 PROCEDURE — 85730 THROMBOPLASTIN TIME PARTIAL: CPT

## 2025-06-11 PROCEDURE — 6360000002 HC RX W HCPCS: Performed by: PHYSICIAN ASSISTANT

## 2025-06-11 PROCEDURE — 5A1935Z RESPIRATORY VENTILATION, LESS THAN 24 CONSECUTIVE HOURS: ICD-10-PCS | Performed by: ANESTHESIOLOGY

## 2025-06-11 RX ORDER — INSULIN LISPRO 100 [IU]/ML
0-6 INJECTION, SOLUTION INTRAVENOUS; SUBCUTANEOUS
Status: DISCONTINUED | OUTPATIENT
Start: 2025-06-12 | End: 2025-06-13

## 2025-06-11 RX ORDER — LIDOCAINE HYDROCHLORIDE 20 MG/ML
INJECTION, SOLUTION INTRAVENOUS
Status: DISCONTINUED | OUTPATIENT
Start: 2025-06-11 | End: 2025-06-11 | Stop reason: SDUPTHER

## 2025-06-11 RX ORDER — AMIODARONE HYDROCHLORIDE 200 MG/1
600 TABLET ORAL ONCE
Status: COMPLETED | OUTPATIENT
Start: 2025-06-11 | End: 2025-06-11

## 2025-06-11 RX ORDER — MIDAZOLAM HYDROCHLORIDE 1 MG/ML
INJECTION, SOLUTION INTRAMUSCULAR; INTRAVENOUS
Status: DISCONTINUED | OUTPATIENT
Start: 2025-06-11 | End: 2025-06-11 | Stop reason: SDUPTHER

## 2025-06-11 RX ORDER — AMIODARONE HYDROCHLORIDE 200 MG/1
200 TABLET ORAL 2 TIMES DAILY
Status: DISCONTINUED | OUTPATIENT
Start: 2025-06-11 | End: 2025-06-14

## 2025-06-11 RX ORDER — POTASSIUM CHLORIDE 29.8 MG/ML
20 INJECTION INTRAVENOUS PRN
Status: DISCONTINUED | OUTPATIENT
Start: 2025-06-11 | End: 2025-06-13

## 2025-06-11 RX ORDER — BUDESONIDE 0.5 MG/2ML
0.5 INHALANT ORAL
Status: DISCONTINUED | OUTPATIENT
Start: 2025-06-11 | End: 2025-06-15

## 2025-06-11 RX ORDER — OXYCODONE AND ACETAMINOPHEN 5; 325 MG/1; MG/1
1 TABLET ORAL EVERY 4 HOURS PRN
Status: DISCONTINUED | OUTPATIENT
Start: 2025-06-11 | End: 2025-06-12

## 2025-06-11 RX ORDER — FAMOTIDINE 20 MG/1
20 TABLET, FILM COATED ORAL EVERY 24 HOURS
Status: DISCONTINUED | OUTPATIENT
Start: 2025-06-11 | End: 2025-06-13

## 2025-06-11 RX ORDER — EPHEDRINE SULFATE 5 MG/ML
INJECTION INTRAVENOUS
Status: DISCONTINUED | OUTPATIENT
Start: 2025-06-11 | End: 2025-06-11 | Stop reason: SDUPTHER

## 2025-06-11 RX ORDER — SODIUM CHLORIDE, SODIUM LACTATE, POTASSIUM CHLORIDE, CALCIUM CHLORIDE 600; 310; 30; 20 MG/100ML; MG/100ML; MG/100ML; MG/100ML
INJECTION, SOLUTION INTRAVENOUS
Status: DISCONTINUED | OUTPATIENT
Start: 2025-06-11 | End: 2025-06-11 | Stop reason: SDUPTHER

## 2025-06-11 RX ORDER — CHLORHEXIDINE GLUCONATE ORAL RINSE 1.2 MG/ML
10 SOLUTION DENTAL 2 TIMES DAILY
Status: DISCONTINUED | OUTPATIENT
Start: 2025-06-11 | End: 2025-06-12

## 2025-06-11 RX ORDER — ATORVASTATIN CALCIUM 80 MG/1
80 TABLET, FILM COATED ORAL NIGHTLY
Status: DISCONTINUED | OUTPATIENT
Start: 2025-06-11 | End: 2025-06-17 | Stop reason: HOSPADM

## 2025-06-11 RX ORDER — HEPARIN SODIUM 1000 [USP'U]/ML
INJECTION, SOLUTION INTRAVENOUS; SUBCUTANEOUS
Status: DISCONTINUED | OUTPATIENT
Start: 2025-06-11 | End: 2025-06-11 | Stop reason: SDUPTHER

## 2025-06-11 RX ORDER — ONDANSETRON 2 MG/ML
4 INJECTION INTRAMUSCULAR; INTRAVENOUS EVERY 4 HOURS PRN
Status: DISCONTINUED | OUTPATIENT
Start: 2025-06-11 | End: 2025-06-13

## 2025-06-11 RX ORDER — MAGNESIUM SULFATE 1 G/100ML
1000 INJECTION INTRAVENOUS PRN
Status: DISCONTINUED | OUTPATIENT
Start: 2025-06-11 | End: 2025-06-13

## 2025-06-11 RX ORDER — INSULIN LISPRO 100 [IU]/ML
0-6 INJECTION, SOLUTION INTRAVENOUS; SUBCUTANEOUS NIGHTLY
Status: DISCONTINUED | OUTPATIENT
Start: 2025-06-12 | End: 2025-06-13

## 2025-06-11 RX ORDER — DEXMEDETOMIDINE HYDROCHLORIDE 4 UG/ML
.1-1.5 INJECTION, SOLUTION INTRAVENOUS CONTINUOUS
Status: DISCONTINUED | OUTPATIENT
Start: 2025-06-11 | End: 2025-06-13

## 2025-06-11 RX ORDER — LIDOCAINE HYDROCHLORIDE 10 MG/ML
1 INJECTION, SOLUTION INFILTRATION; PERINEURAL
Status: DISCONTINUED | OUTPATIENT
Start: 2025-06-11 | End: 2025-06-11 | Stop reason: HOSPADM

## 2025-06-11 RX ORDER — SODIUM CHLORIDE, SODIUM LACTATE, POTASSIUM CHLORIDE, CALCIUM CHLORIDE 600; 310; 30; 20 MG/100ML; MG/100ML; MG/100ML; MG/100ML
INJECTION, SOLUTION INTRAVENOUS CONTINUOUS
Status: DISCONTINUED | OUTPATIENT
Start: 2025-06-11 | End: 2025-06-11 | Stop reason: HOSPADM

## 2025-06-11 RX ORDER — SODIUM CHLORIDE 0.9 % (FLUSH) 0.9 %
5-40 SYRINGE (ML) INJECTION PRN
Status: DISCONTINUED | OUTPATIENT
Start: 2025-06-11 | End: 2025-06-13

## 2025-06-11 RX ORDER — SODIUM CHLORIDE 9 MG/ML
INJECTION, SOLUTION INTRAVENOUS PRN
Status: DISCONTINUED | OUTPATIENT
Start: 2025-06-11 | End: 2025-06-11 | Stop reason: HOSPADM

## 2025-06-11 RX ORDER — SODIUM CHLORIDE 9 MG/ML
INJECTION, SOLUTION INTRAVENOUS PRN
Status: DISCONTINUED | OUTPATIENT
Start: 2025-06-11 | End: 2025-06-13

## 2025-06-11 RX ORDER — HEPARIN SODIUM 5000 [USP'U]/ML
INJECTION, SOLUTION INTRAVENOUS; SUBCUTANEOUS PRN
Status: DISCONTINUED | OUTPATIENT
Start: 2025-06-11 | End: 2025-06-11 | Stop reason: HOSPADM

## 2025-06-11 RX ORDER — SODIUM CHLORIDE 0.9 % (FLUSH) 0.9 %
5-40 SYRINGE (ML) INJECTION EVERY 12 HOURS SCHEDULED
Status: DISCONTINUED | OUTPATIENT
Start: 2025-06-11 | End: 2025-06-11 | Stop reason: HOSPADM

## 2025-06-11 RX ORDER — ARFORMOTEROL TARTRATE 15 UG/2ML
15 SOLUTION RESPIRATORY (INHALATION)
Status: DISCONTINUED | OUTPATIENT
Start: 2025-06-11 | End: 2025-06-15

## 2025-06-11 RX ORDER — ALBUMIN HUMAN 50 G/1000ML
SOLUTION INTRAVENOUS CONTINUOUS PRN
Status: COMPLETED | OUTPATIENT
Start: 2025-06-11 | End: 2025-06-11

## 2025-06-11 RX ORDER — SODIUM CHLORIDE 0.9 % (FLUSH) 0.9 %
5-40 SYRINGE (ML) INJECTION EVERY 12 HOURS SCHEDULED
Status: DISCONTINUED | OUTPATIENT
Start: 2025-06-11 | End: 2025-06-13

## 2025-06-11 RX ORDER — DEXMEDETOMIDINE HYDROCHLORIDE 4 UG/ML
INJECTION, SOLUTION INTRAVENOUS
Status: DISCONTINUED | OUTPATIENT
Start: 2025-06-11 | End: 2025-06-11 | Stop reason: SDUPTHER

## 2025-06-11 RX ORDER — METOPROLOL TARTRATE 25 MG/1
12.5 TABLET, FILM COATED ORAL 2 TIMES DAILY
Status: DISCONTINUED | OUTPATIENT
Start: 2025-06-12 | End: 2025-06-17 | Stop reason: HOSPADM

## 2025-06-11 RX ORDER — DOPAMINE HYDROCHLORIDE 320 MG/100ML
2 INJECTION, SOLUTION INTRAVENOUS CONTINUOUS
Status: DISCONTINUED | OUTPATIENT
Start: 2025-06-11 | End: 2025-06-12

## 2025-06-11 RX ORDER — ASPIRIN 81 MG/1
81 TABLET ORAL DAILY
Status: DISCONTINUED | OUTPATIENT
Start: 2025-06-12 | End: 2025-06-17 | Stop reason: HOSPADM

## 2025-06-11 RX ORDER — AMINOCAPROIC ACID 250 MG/ML
INJECTION, SOLUTION INTRAVENOUS
Status: DISCONTINUED | OUTPATIENT
Start: 2025-06-11 | End: 2025-06-11 | Stop reason: SDUPTHER

## 2025-06-11 RX ORDER — DOPAMINE HYDROCHLORIDE 320 MG/100ML
2.5 INJECTION, SOLUTION INTRAVENOUS CONTINUOUS
Status: DISCONTINUED | OUTPATIENT
Start: 2025-06-11 | End: 2025-06-11

## 2025-06-11 RX ORDER — ALBUTEROL SULFATE 0.83 MG/ML
2.5 SOLUTION RESPIRATORY (INHALATION) EVERY 6 HOURS PRN
Status: DISCONTINUED | OUTPATIENT
Start: 2025-06-11 | End: 2025-06-17 | Stop reason: HOSPADM

## 2025-06-11 RX ORDER — ACETAMINOPHEN 325 MG/1
650 TABLET ORAL EVERY 6 HOURS
Status: DISCONTINUED | OUTPATIENT
Start: 2025-06-11 | End: 2025-06-14

## 2025-06-11 RX ORDER — FENTANYL CITRATE 0.05 MG/ML
INJECTION, SOLUTION INTRAMUSCULAR; INTRAVENOUS
Status: DISCONTINUED | OUTPATIENT
Start: 2025-06-11 | End: 2025-06-11 | Stop reason: SDUPTHER

## 2025-06-11 RX ORDER — ROCURONIUM BROMIDE 10 MG/ML
INJECTION, SOLUTION INTRAVENOUS
Status: DISCONTINUED | OUTPATIENT
Start: 2025-06-11 | End: 2025-06-11 | Stop reason: SDUPTHER

## 2025-06-11 RX ORDER — SODIUM CHLORIDE 0.9 % (FLUSH) 0.9 %
5-40 SYRINGE (ML) INJECTION PRN
Status: DISCONTINUED | OUTPATIENT
Start: 2025-06-11 | End: 2025-06-11 | Stop reason: HOSPADM

## 2025-06-11 RX ORDER — ETOMIDATE 2 MG/ML
INJECTION INTRAVENOUS
Status: DISCONTINUED | OUTPATIENT
Start: 2025-06-11 | End: 2025-06-11 | Stop reason: SDUPTHER

## 2025-06-11 RX ORDER — NOREPINEPHRINE BITARTRATE 0.02 MG/ML
INJECTION, SOLUTION INTRAVENOUS
Status: DISCONTINUED | OUTPATIENT
Start: 2025-06-11 | End: 2025-06-11 | Stop reason: SDUPTHER

## 2025-06-11 RX ORDER — NOREPINEPHRINE BITARTRATE 0.02 MG/ML
.01-.2 INJECTION, SOLUTION INTRAVENOUS CONTINUOUS
Status: DISCONTINUED | OUTPATIENT
Start: 2025-06-11 | End: 2025-06-13

## 2025-06-11 RX ORDER — HYDROMORPHONE HYDROCHLORIDE 1 MG/ML
0.25 INJECTION, SOLUTION INTRAMUSCULAR; INTRAVENOUS; SUBCUTANEOUS
Status: DISCONTINUED | OUTPATIENT
Start: 2025-06-11 | End: 2025-06-12

## 2025-06-11 RX ORDER — PAPAVERINE HYDROCHLORIDE 30 MG/ML
INJECTION INTRAMUSCULAR; INTRAVENOUS PRN
Status: DISCONTINUED | OUTPATIENT
Start: 2025-06-11 | End: 2025-06-11 | Stop reason: HOSPADM

## 2025-06-11 RX ORDER — AMIODARONE HYDROCHLORIDE 150 MG/3ML
INJECTION, SOLUTION INTRAVENOUS
Status: DISCONTINUED | OUTPATIENT
Start: 2025-06-11 | End: 2025-06-11 | Stop reason: SDUPTHER

## 2025-06-11 RX ORDER — PROTAMINE SULFATE 10 MG/ML
INJECTION, SOLUTION INTRAVENOUS
Status: DISCONTINUED | OUTPATIENT
Start: 2025-06-11 | End: 2025-06-11 | Stop reason: SDUPTHER

## 2025-06-11 RX ORDER — NITROGLYCERIN 20 MG/100ML
0-100 INJECTION INTRAVENOUS CONTINUOUS PRN
Status: DISCONTINUED | OUTPATIENT
Start: 2025-06-11 | End: 2025-06-13

## 2025-06-11 RX ORDER — ALBUMIN HUMAN 50 G/1000ML
SOLUTION INTRAVENOUS
Status: DISCONTINUED | OUTPATIENT
Start: 2025-06-11 | End: 2025-06-11 | Stop reason: SDUPTHER

## 2025-06-11 RX ORDER — MIDAZOLAM HYDROCHLORIDE 2 MG/2ML
1 INJECTION, SOLUTION INTRAMUSCULAR; INTRAVENOUS
Status: DISCONTINUED | OUTPATIENT
Start: 2025-06-11 | End: 2025-06-13

## 2025-06-11 RX ORDER — VECURONIUM BROMIDE 1 MG/ML
INJECTION, POWDER, LYOPHILIZED, FOR SOLUTION INTRAVENOUS
Status: DISCONTINUED | OUTPATIENT
Start: 2025-06-11 | End: 2025-06-11 | Stop reason: SDUPTHER

## 2025-06-11 RX ORDER — DEXTROSE MONOHYDRATE 100 MG/ML
INJECTION, SOLUTION INTRAVENOUS CONTINUOUS PRN
Status: DISCONTINUED | OUTPATIENT
Start: 2025-06-11 | End: 2025-06-13

## 2025-06-11 RX ORDER — NITROGLYCERIN 20 MG/100ML
INJECTION INTRAVENOUS
Status: DISCONTINUED | OUTPATIENT
Start: 2025-06-11 | End: 2025-06-11 | Stop reason: SDUPTHER

## 2025-06-11 RX ADMIN — ARFORMOTEROL TARTRATE 15 MCG: 15 SOLUTION RESPIRATORY (INHALATION) at 20:07

## 2025-06-11 RX ADMIN — MIDAZOLAM 2 MG: 1 INJECTION INTRAMUSCULAR; INTRAVENOUS at 09:00

## 2025-06-11 RX ADMIN — AMINOCAPROIC ACID 1 G/HR: 250 INJECTION, SOLUTION INTRAVENOUS at 08:43

## 2025-06-11 RX ADMIN — FENTANYL CITRATE 150 MCG: 0.05 INJECTION, SOLUTION INTRAMUSCULAR; INTRAVENOUS at 07:41

## 2025-06-11 RX ADMIN — LIDOCAINE HYDROCHLORIDE 60 MG: 20 INJECTION, SOLUTION INTRAVENOUS at 07:41

## 2025-06-11 RX ADMIN — FENTANYL CITRATE 100 MCG: 0.05 INJECTION, SOLUTION INTRAMUSCULAR; INTRAVENOUS at 09:41

## 2025-06-11 RX ADMIN — ROCURONIUM BROMIDE 50 MG: 10 INJECTION, SOLUTION INTRAVENOUS at 10:41

## 2025-06-11 RX ADMIN — PHENYLEPHRINE HYDROCHLORIDE 0.5 ML: 10 INJECTION INTRAVENOUS at 08:21

## 2025-06-11 RX ADMIN — SODIUM CHLORIDE, PRESERVATIVE FREE 10 ML: 5 INJECTION INTRAVENOUS at 20:43

## 2025-06-11 RX ADMIN — DEXMEDETOMIDINE HYDROCHLORIDE 0.5 MCG/KG/HR: 4 INJECTION, SOLUTION INTRAVENOUS at 10:52

## 2025-06-11 RX ADMIN — DOPAMINE HYDROCHLORIDE 2 MCG/KG/MIN: 320 INJECTION, SOLUTION INTRAVENOUS at 13:10

## 2025-06-11 RX ADMIN — PHENYLEPHRINE HYDROCHLORIDE 10 MCG/MIN: 10 INJECTION INTRAVENOUS at 08:41

## 2025-06-11 RX ADMIN — EPHEDRINE SULFATE 5 MG: 5 INJECTION INTRAVENOUS at 08:19

## 2025-06-11 RX ADMIN — NITROGLYCERIN 10 MCG/MIN: 20 INJECTION INTRAVENOUS at 08:11

## 2025-06-11 RX ADMIN — ALBUMIN (HUMAN) 250 ML: 12.5 SOLUTION INTRAVENOUS at 11:27

## 2025-06-11 RX ADMIN — BUDESONIDE INHALATION 500 MCG: 0.5 SUSPENSION RESPIRATORY (INHALATION) at 20:07

## 2025-06-11 RX ADMIN — INSULIN HUMAN 2 UNITS/HR: 1 INJECTION, SOLUTION INTRAVENOUS at 08:45

## 2025-06-11 RX ADMIN — EPINEPHRINE 0.02 MCG/KG/MIN: 1 INJECTION INTRAMUSCULAR; INTRAVENOUS; SUBCUTANEOUS at 11:00

## 2025-06-11 RX ADMIN — MIDAZOLAM 3 MG: 1 INJECTION INTRAMUSCULAR; INTRAVENOUS at 10:41

## 2025-06-11 RX ADMIN — FENTANYL CITRATE 200 MCG: 0.05 INJECTION, SOLUTION INTRAMUSCULAR; INTRAVENOUS at 09:00

## 2025-06-11 RX ADMIN — PROTAMINE SULFATE 250 MG: 10 INJECTION, SOLUTION INTRAVENOUS at 11:08

## 2025-06-11 RX ADMIN — VECURONIUM BROMIDE 4 MG: 1 INJECTION, POWDER, LYOPHILIZED, FOR SOLUTION INTRAVENOUS at 08:54

## 2025-06-11 RX ADMIN — SODIUM CHLORIDE, SODIUM LACTATE, POTASSIUM CHLORIDE, AND CALCIUM CHLORIDE: 600; 310; 30; 20 INJECTION, SOLUTION INTRAVENOUS at 11:50

## 2025-06-11 RX ADMIN — CEFAZOLIN 2000 MG: 10 INJECTION, POWDER, FOR SOLUTION INTRAVENOUS at 19:57

## 2025-06-11 RX ADMIN — FENTANYL CITRATE 100 MCG: 0.05 INJECTION, SOLUTION INTRAMUSCULAR; INTRAVENOUS at 09:42

## 2025-06-11 RX ADMIN — AMIODARONE HYDROCHLORIDE 150 MG: 50 INJECTION, SOLUTION INTRAVENOUS at 11:01

## 2025-06-11 RX ADMIN — ROCURONIUM BROMIDE 50 MG: 10 INJECTION, SOLUTION INTRAVENOUS at 07:41

## 2025-06-11 RX ADMIN — FENTANYL CITRATE 100 MCG: 0.05 INJECTION, SOLUTION INTRAMUSCULAR; INTRAVENOUS at 08:32

## 2025-06-11 RX ADMIN — Medication 1 AMPULE: at 20:24

## 2025-06-11 RX ADMIN — Medication 0.02 MCG/KG/MIN: at 11:27

## 2025-06-11 RX ADMIN — ETOMIDATE 16 MG: 2 INJECTION INTRAVENOUS at 07:41

## 2025-06-11 RX ADMIN — WATER 2000 MG: 1 INJECTION INTRAMUSCULAR; INTRAVENOUS; SUBCUTANEOUS at 11:20

## 2025-06-11 RX ADMIN — FAMOTIDINE 20 MG: 10 INJECTION, SOLUTION INTRAVENOUS at 19:57

## 2025-06-11 RX ADMIN — AMINOCAPROIC ACID 10000 MG: 250 INJECTION, SOLUTION INTRAVENOUS at 08:01

## 2025-06-11 RX ADMIN — HEPARIN SODIUM 30000 UNITS: 1000 INJECTION INTRAVENOUS; SUBCUTANEOUS at 09:33

## 2025-06-11 RX ADMIN — SODIUM CHLORIDE, SODIUM LACTATE, POTASSIUM CHLORIDE, AND CALCIUM CHLORIDE: 600; 310; 30; 20 INJECTION, SOLUTION INTRAVENOUS at 08:01

## 2025-06-11 RX ADMIN — SODIUM CHLORIDE 0.05 MCG/KG/MIN: 0.9 INJECTION, SOLUTION INTRAVENOUS at 20:42

## 2025-06-11 RX ADMIN — FENTANYL CITRATE 100 MCG: 0.05 INJECTION, SOLUTION INTRAMUSCULAR; INTRAVENOUS at 08:39

## 2025-06-11 RX ADMIN — CHLORHEXIDINE GLUCONATE 10 ML: 1.2 RINSE ORAL at 16:38

## 2025-06-11 RX ADMIN — SODIUM CHLORIDE: 0.9 INJECTION, SOLUTION INTRAVENOUS at 18:17

## 2025-06-11 RX ADMIN — IPRATROPIUM BROMIDE 0.5 MG: 0.5 SOLUTION RESPIRATORY (INHALATION) at 20:07

## 2025-06-11 RX ADMIN — TUBERCULIN PURIFIED PROTEIN DERIVATIVE 5 UNITS: 5 INJECTION, SOLUTION INTRADERMAL at 19:58

## 2025-06-11 RX ADMIN — SODIUM CHLORIDE, SODIUM LACTATE, POTASSIUM CHLORIDE, AND CALCIUM CHLORIDE: .6; .31; .03; .02 INJECTION, SOLUTION INTRAVENOUS at 06:01

## 2025-06-11 RX ADMIN — MIDAZOLAM 2 MG: 1 INJECTION INTRAMUSCULAR; INTRAVENOUS at 07:37

## 2025-06-11 RX ADMIN — VECURONIUM BROMIDE 3 MG: 1 INJECTION, POWDER, LYOPHILIZED, FOR SOLUTION INTRAVENOUS at 08:33

## 2025-06-11 RX ADMIN — WATER 2000 MG: 1 INJECTION INTRAMUSCULAR; INTRAVENOUS; SUBCUTANEOUS at 08:09

## 2025-06-11 RX ADMIN — Medication 3 AMPULE: at 06:17

## 2025-06-11 RX ADMIN — AMIODARONE HYDROCHLORIDE 600 MG: 200 TABLET ORAL at 06:15

## 2025-06-11 RX ADMIN — FENTANYL CITRATE 100 MCG: 0.05 INJECTION, SOLUTION INTRAMUSCULAR; INTRAVENOUS at 09:45

## 2025-06-11 ASSESSMENT — PAIN SCALES - GENERAL
PAINLEVEL_OUTOF10: 0
PAINLEVEL_OUTOF10: 0

## 2025-06-11 ASSESSMENT — PAIN - FUNCTIONAL ASSESSMENT: PAIN_FUNCTIONAL_ASSESSMENT: 0-10

## 2025-06-11 ASSESSMENT — LIFESTYLE VARIABLES: SMOKING_STATUS: 1

## 2025-06-11 ASSESSMENT — PULMONARY FUNCTION TESTS: PIF_VALUE: 24

## 2025-06-11 NOTE — PERIOP NOTE
Pt placed on 3 L O2 per NC per protocol. Allevyn dsg placed on chart per protocol. Warming blanket applied. Hair clipped chin to toe per protocol. Wiped with CHG wipes. #18g IV placed in R hand. Amio 600mg @ 0615 and metoprolol 50mg @ 0300 this AM. Pt watched pre-op teaching video. All necessary studies in pt's chart, reviewed. On heart monitor. Call light given. Family at bedside.

## 2025-06-11 NOTE — BRIEF OP NOTE
Brief Postoperative Note      Patient: Humphrey Villalpando  YOB: 1945  MRN: [id number]    Date of Procedure: 6/11/2025    Pre-Op Diagnosis Codes:      * Atherosclerotic heart disease of native coronary artery with unstable angina pectoris (HCC) [I25.110]     * Chest pain [R07.9]     * Tobacco abuse [Z72.0]     * Chronic systolic heart failure (HCC) [I50.22]    Post-Op Diagnosis: Same       Procedure(s):  CORONARY ARTERY BYPASS GRAFTING (CABG X3) LIMA, LEFT LEG ENDOSCOPIC SAPHENOUS VEIN HARVEST  TRANSESOPHAGEAL ECHOCARDIOGRAM    Surgeon(s):  Bruce Skaggs MD    Assistant:  Surgical Assistant: Kelly Valera    Anesthesia: General    Estimated Blood Loss (mL): Minimal    Complications: None    Specimens:   * No specimens in log *    Implants:  * No implants in log *      Drains:   Chest Tube Left Pleural 1 (Active)   Chest Tube Airleak Yes 06/11/25 1200   Status Continuous Suction 06/11/25 1200   Suction -20 cm H2O 06/11/25 1200   Y Connector Used Yes 06/11/25 1200   Drainage Description Sanguinous 06/11/25 1200   Dressing Status New dressing applied 06/11/25 1200   Chest Tube Dressing Petroleum Jelly 06/11/25 1200   Site Assessment Clean, dry & intact 06/11/25 1200   Surrounding Skin Clean, dry & intact 06/11/25 1200       Chest Tube Anterior Mediastinal 2 (Active)   Chest Tube Airleak Yes 06/11/25 1200   Status Continuous Suction 06/11/25 1200   Suction -20 cm H2O 06/11/25 1200   Y Connector Used Yes 06/11/25 1200   Drainage Description Sanguinous 06/11/25 1200   Dressing Status New dressing applied 06/11/25 1200   Chest Tube Dressing Petroleum Jelly 06/11/25 1200   Site Assessment Clean, dry & intact 06/11/25 1200   Surrounding Skin Clean, dry & intact 06/11/25 1200       Urinary Catheter 06/11/25 Matos-Temperature (Active)   $ Urethral catheter insertion Inserted for procedure 06/11/25 1200   Catheter Indications Perioperative use for selected surgical procedures;Need for fluid volume

## 2025-06-11 NOTE — OR NURSING
TRANSFER - OUT REPORT:    Verbal report given to TOMASA RN on Humphrey Villalpando  being transferred to CVICU for routine post-op       Report consisted of patient's Situation, Background, Assessment and   Recommendations(SBAR).     Information from the following report(s) Nurse Handoff Report and Surgery Report was reviewed with the receiving nurse.           Lines:   Peripheral IV 06/11/25 Posterior;Right Hand (Active)       Arterial Line 06/11/25 Radial (Active)       Introducer 06/11/25 (Active)        Opportunity for questions and clarification was provided.      Patient transported with:  Monitor, O2 @ 15lpm, and Registered Nurse, CRNA, KATHE
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
## 2025-06-11 NOTE — OP NOTE
[address]  [address]                            OPERATIVE REPORT      PATIENT NAME: ROB PATE         : 1945  MED REC NO: [id number]                       ROOM: 105  ACCOUNT NO: [id number]                       ADMIT DATE: 2025  PROVIDER: Bruce Skaggs MD    DATE OF SERVICE:  2025    PREOPERATIVE DIAGNOSES:  Severe multivessel atherosclerotic coronary artery disease.    POSTOPERATIVE DIAGNOSES:  Severe multivessel atherosclerotic coronary artery disease.    PROCEDURES PERFORMED:          1. Endoscopic vein harvest from the left leg.        2. Coronary artery bypass grafting x3 grafts consisting:           a. Left internal mammary artery to the LAD.           b. Reverse saphenous vein graft to OM.           c. Reverse saphenous vein graft to the PDA.    SURGEON:  Bruce Skaggs MD    ASSISTANT:  ***    ANESTHESIA:  General endotracheal with LUIS ENRIQUE.    ESTIMATED BLOOD LOSS:  Minimal.    SPECIMENS REMOVED:  ***    INTRAOPERATIVE FINDINGS:  Saphenous vein 3 to 4-mm.  LIMA 2.5 mm.  Aorta, soft, no palpable plaque.  Of note, there was extensive calcific coronary artery disease in the small vessels.  LAD is 1.4 mm, severe distal disease.  OM is 1.3 mm, severe distal disease.  PDA is 1.3 mm, severe distal disease.     COMPLICATIONS:  None.    All instrument and sponge counts were correct at the end of the case.    IMPLANTS:  ***    INDICATIONS:  The patient is a very pleasant 79-year-old gentleman, who is followed by Dr. Ramos.  He had a recent echo, which showed reduced EF.  Left heart catheterization showed severe multivessel disease including critical right and OM disease.  Pressure wire was positive for LAD disease.    DESCRIPTION OF PROCEDURE:  After informed consent was obtained for the above-mentioned procedure, the patient was then taken to the operating room.  Appropriate monitoring lines were

## 2025-06-11 NOTE — ANESTHESIA POSTPROCEDURE EVALUATION
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    Ignacio Phase I: Ignacio Score: 10    Ignacio Phase II:      Anesthesia Post Evaluation    Patient location during evaluation: ICU  Patient participation: complete - patient cannot participate  Level of consciousness: sedated and ventilated  Airway patency: patent  Nausea & Vomiting: no vomiting  Cardiovascular status: vasoactive/inotropes  Respiratory status: ventilator and intubated  Hydration status: euvolemic  Comments: BP (!) 101/49   Pulse 64   Temp 98.1 °F (36.7 °C) (Bladder)   Resp 18   Ht 1.753 m (5' 9\")   Wt 92.4 kg (203 lb 12.8 oz)   SpO2 98%   BMI 30.10 kg/m²     Pain management: adequate    No notable events documented.   Start time: 10:04am  End time: 10:59am    The patient was informed of the current need to conduct treatment via telephone or telehealth due to Covid-19 pandemic. Patient consented to the use of the platform that may not be HIPAA compliant. I have confirmed the patient's identity via the following (minimum of three) acceptable identifiers as per  Policy PH-9:   1. Last 4 of social:   2. :   3. Address:    Telephone/Televideo Informed Consent for Psychotherapy was reviewed with the patient as follows:  There are potential benefits and risks of the use of telephone or video-conferencing that differ from in-person sessions. Specifically, the telephone or televideo system we are using may not be HIPAA compliant and may present limits to patient confidentiality. Confidentiality still applies for telepsychology services, and nobody will record the session without your permission. You agree to use the telephone or video-conferencing platform selected for our virtual sessions, and I will explain how to use it.  1)             You need to use a webcam or smartphone during the session.  2)             It is important that you be in a quiet, private space that is free of distractions (including cell phone or other devices) during the session.  3)             It is important to use a secure internet connection rather than public/free Wi-Fi.  4)             It is important to be on time. If you need to cancel or change your tele-appointment, you must notify the psychologist in advance by phone or email.  5)             We need a back-up plan (e.g., phone number where you can be reached) to restart the session or to reschedule it, in the event of technical problems.  6)             We need a safety plan that includes at least one emergency contact and the closest emergency room to your location, in the event of a crisis situation.  7)             If you are not an adult, we need the permission of your parent or legal guardian  "(and their contact information) for you to participate in telepsychology sessions.  Understanding and verbal agreement was attested to by the patient.    Magaly reported that she would like her notes to remain blocked at this time.     Reason for care: Anxiety; Depression  Method of therapy: Supportive; CBT  Therapy summary: Magaly reported that she had Covid-19 last week and was not feeling well. She processed guilt she felt related to work. She is trying not to spend too much time in bed and explored what it looks leonard to create a plan for the week.     Her father's birthday was last week and she discussed how she spent that day remembering him. She described it being \"surreal\" that he is not here. She stated that she has been supporting her mother as well and discussed how she is supporting her own self as well.     She explored how she is spending her time now that she is not visiting her father. We reviewed making plans with friends and working out a schedule for her free time. She has been attempting to reconnect with friends and discussed recent dynamics.    She is still trying to lose weight. She is focusing on self-compassion and what she is doing vs beating herself up for what she is not doing. We continued to review positive and compassionate self-talk and reducing perfectionism.     Action plan: Processing grief related to father's passing; engaging in self-care and balancing responsibilities     She denies SI/HI/AVH. We will follow-up in one month and check-in as needed.   Identified goals/objectives: Processing grief; Continue engaging in self-care activities; identify workable schedule and routine (continue increasing activity); Continue exercising; reframe negative thinking with writing  Response to therapy: Engaged  Treatment plan and process: Continue with above stated tx goals; Psycho-education on anxiety management strategies; Goal setting for health and wellness; Schedule and Routine " exploration; Education around decision-making; Processing grief; Providing education around grief and loss

## 2025-06-11 NOTE — ANESTHESIA PROCEDURE NOTES
Arterial Line:    An arterial line was placed using surface landmarks, in the OR for the following indication(s): continuous blood pressure monitoring and blood sampling needed.    A 20 gauge (size) (length), Arrow (type) catheter was placed, Seldinger technique used, into the left radial artery, secured by Tegaderm.  Anesthesia type: Local  Local infiltration: Injection    Events:  patient tolerated procedure well with no complications.6/11/2025 7:35 AM6/11/2025 7:38 AM  Resident/CRNA: Sonam Dockery APRN - CRNA  Preanesthetic Checklist  Completed: patient identified, IV checked, site marked, risks and benefits discussed, surgical/procedural consents, equipment checked, pre-op evaluation, timeout performed, anesthesia consent given, oxygen available, monitors applied/VS acknowledged, fire risk safety assessment completed and verbalized and blood product R/B/A discussed and consented          
Airway  Date/Time: 6/11/2025 7:43 AM  Urgency: elective    Airway not difficult    General Information and Staff    Patient location during procedure: OR  Resident/CRNA: Sonam Dockery APRN - CRNA  Performed: resident/CRNA   Performed by: Sonam Dockery APRN - CRNA  Authorized by: Arnie Howell MD      Indications and Patient Condition  Indications for airway management: anesthesia  Spontaneous Ventilation: absent  Sedation level: deep  Preoxygenated: yes  Patient position: sniffing  MILS not maintained throughout  Mask difficulty assessment: vent by bag mask + OA or adjuvant +/- NMBA    Final Airway Details  Final airway type: endotracheal airway      Successful airway: ETT  Cuffed: yes   Successful intubation technique: direct laryngoscopy  Facilitating devices/methods: intubating stylet  Endotracheal tube insertion site: oral  Blade: Butch  Blade size: #4  ETT size (mm): 8.0  Cormack-Lehane Classification: grade I - full view of glottis  Placement verified by: chest auscultation and capnometry   Measured from: lips  Number of attempts at approach: 1  Ventilation between attempts: bag mask  Number of other approaches attempted: 0    no      
Procedure Performed: LUIS ENRIQUE       Start Time:  6/11/2025 7:59 AM       End Time:   6/11/2025 8:11 AM    Anesthesia Information  Performed Personally  Anesthesiologist:  Arnie Howell MD        Preanesthesia Checklist:  Patient identified, IV assessed, risks and benefits discussed, monitors and equipment assessed, procedure being performed at surgeon's request and anesthesia consent obtained.    General Procedure Information  Diagnostic Indications for Echo:  assessment of ascending aorta, assessment of surgical repair, hemodynamic monitoring and assessment of valve function  Physician Requesting Echo: Bruce Skaggs MD  Location performed:  OR  Intubated  Bite block placed  Heart visualized  Probe Insertion:  Easy  Probe Type:  Mulitplane and biplane  Modalities:  2D only and color flow mapping    Echocardiographic and Doppler Measurements    Ventricles    Ventricle  Cavity Size  Cavity          Dimension  Hypertrophy  Thrombus  Global FXN  EF    RV  normal    No  No        LV  normal    No  No  mildly impaired (40-49%)  40%           Valves     Valves  Annulus  Stenosis Measurements   Regurg  Leaflet   Morph  Leaflet   Motion Valve Comments    Aortic normal Stenosis none.            none   normal normal       Mitral normal mild, moderate             none normal normal    Tricuspid normal          none        Pulmonic normal          none              Aorta     Aorta  Size  Diam(cm)  Dissection Mcclellan Classification    Ascending normal         Arch normal        Descending normal    Dissection not present.                Atria     Size  SEC (smoke)  Thrombus  Tumor  Device    Rt Atrium normal        Lt Atrium normal         Left Atrial Appendage: normal        Septa    Interatrial Septal Morphology: normal          Interventricular Septal Morphology: normal              Other Findings  Pericardium:  normal  Pleural Effusion:  none    Post Intervention Follow-up Study  Ventricular Global Function: unchanged. 
Arine Howell MD    Preanesthetic Checklist  Completed: patient identified, IV checked, risks and benefits discussed, equipment checked, pre-op evaluation, timeout performed, anesthesia consent given, oxygen available and monitors applied/VS acknowledged

## 2025-06-12 ENCOUNTER — APPOINTMENT (OUTPATIENT)
Dept: GENERAL RADIOLOGY | Age: 80
DRG: 235 | End: 2025-06-12
Attending: THORACIC SURGERY (CARDIOTHORACIC VASCULAR SURGERY)
Payer: MEDICARE

## 2025-06-12 PROBLEM — R09.02 HYPOXEMIA: Status: ACTIVE | Noted: 2025-06-12

## 2025-06-12 LAB
ANION GAP SERPL CALC-SCNC: 10 MMOL/L (ref 7–16)
BUN SERPL-MCNC: 33 MG/DL (ref 8–23)
CALCIUM SERPL-MCNC: 8.6 MG/DL (ref 8.8–10.2)
CHLORIDE SERPL-SCNC: 111 MMOL/L (ref 98–107)
CO2 SERPL-SCNC: 21 MMOL/L (ref 20–29)
CREAT SERPL-MCNC: 1.93 MG/DL (ref 0.8–1.3)
EKG ATRIAL RATE: 73 BPM
EKG DIAGNOSIS: NORMAL
EKG P AXIS: 47 DEGREES
EKG P-R INTERVAL: 144 MS
EKG Q-T INTERVAL: 414 MS
EKG QRS DURATION: 92 MS
EKG QTC CALCULATION (BAZETT): 456 MS
EKG R AXIS: -16 DEGREES
EKG T AXIS: 5 DEGREES
EKG VENTRICULAR RATE: 73 BPM
ERYTHROCYTE [DISTWIDTH] IN BLOOD BY AUTOMATED COUNT: 14 % (ref 11.9–14.6)
GLUCOSE BLD STRIP.AUTO-MCNC: 103 MG/DL (ref 65–100)
GLUCOSE BLD STRIP.AUTO-MCNC: 107 MG/DL (ref 65–100)
GLUCOSE BLD STRIP.AUTO-MCNC: 108 MG/DL (ref 65–100)
GLUCOSE BLD STRIP.AUTO-MCNC: 116 MG/DL (ref 65–100)
GLUCOSE BLD STRIP.AUTO-MCNC: 138 MG/DL (ref 65–100)
GLUCOSE BLD STRIP.AUTO-MCNC: 161 MG/DL (ref 65–100)
GLUCOSE BLD STRIP.AUTO-MCNC: 239 MG/DL (ref 65–100)
GLUCOSE SERPL-MCNC: 106 MG/DL (ref 70–99)
HCT VFR BLD AUTO: 41.3 % (ref 41.1–50.3)
HGB BLD-MCNC: 13.4 G/DL (ref 13.6–17.2)
MAGNESIUM SERPL-MCNC: 2.8 MG/DL (ref 1.8–2.4)
MCH RBC QN AUTO: 32.8 PG (ref 26.1–32.9)
MCHC RBC AUTO-ENTMCNC: 32.4 G/DL (ref 31.4–35)
MCV RBC AUTO: 101 FL (ref 82–102)
NRBC # BLD: 0 K/UL (ref 0–0.2)
PLATELET # BLD AUTO: 163 K/UL (ref 150–450)
PMV BLD AUTO: 10.9 FL (ref 9.4–12.3)
POTASSIUM SERPL-SCNC: 5 MMOL/L (ref 3.5–5.1)
RBC # BLD AUTO: 4.09 M/UL (ref 4.23–5.6)
SERVICE CMNT-IMP: ABNORMAL
SODIUM SERPL-SCNC: 142 MMOL/L (ref 136–145)
WBC # BLD AUTO: 12.3 K/UL (ref 4.3–11.1)

## 2025-06-12 PROCEDURE — 71045 X-RAY EXAM CHEST 1 VIEW: CPT

## 2025-06-12 PROCEDURE — 2580000003 HC RX 258: Performed by: PHYSICIAN ASSISTANT

## 2025-06-12 PROCEDURE — 6360000002 HC RX W HCPCS: Performed by: PHYSICIAN ASSISTANT

## 2025-06-12 PROCEDURE — 93005 ELECTROCARDIOGRAM TRACING: CPT | Performed by: PHYSICIAN ASSISTANT

## 2025-06-12 PROCEDURE — 83735 ASSAY OF MAGNESIUM: CPT

## 2025-06-12 PROCEDURE — 2100000001 HC CVICU R&B

## 2025-06-12 PROCEDURE — 2700000000 HC OXYGEN THERAPY PER DAY

## 2025-06-12 PROCEDURE — 6370000000 HC RX 637 (ALT 250 FOR IP): Performed by: THORACIC SURGERY (CARDIOTHORACIC VASCULAR SURGERY)

## 2025-06-12 PROCEDURE — 85027 COMPLETE CBC AUTOMATED: CPT

## 2025-06-12 PROCEDURE — 99233 SBSQ HOSP IP/OBS HIGH 50: CPT | Performed by: INTERNAL MEDICINE

## 2025-06-12 PROCEDURE — 94640 AIRWAY INHALATION TREATMENT: CPT

## 2025-06-12 PROCEDURE — 92610 EVALUATE SWALLOWING FUNCTION: CPT

## 2025-06-12 PROCEDURE — 82962 GLUCOSE BLOOD TEST: CPT

## 2025-06-12 PROCEDURE — 93010 ELECTROCARDIOGRAM REPORT: CPT | Performed by: INTERNAL MEDICINE

## 2025-06-12 PROCEDURE — 2500000003 HC RX 250 WO HCPCS: Performed by: PHYSICIAN ASSISTANT

## 2025-06-12 PROCEDURE — 6370000000 HC RX 637 (ALT 250 FOR IP): Performed by: PHYSICIAN ASSISTANT

## 2025-06-12 PROCEDURE — 6360000002 HC RX W HCPCS: Performed by: STUDENT IN AN ORGANIZED HEALTH CARE EDUCATION/TRAINING PROGRAM

## 2025-06-12 PROCEDURE — 37799 UNLISTED PX VASCULAR SURGERY: CPT

## 2025-06-12 PROCEDURE — 80048 BASIC METABOLIC PNL TOTAL CA: CPT

## 2025-06-12 PROCEDURE — 94761 N-INVAS EAR/PLS OXIMETRY MLT: CPT

## 2025-06-12 PROCEDURE — 92526 ORAL FUNCTION THERAPY: CPT

## 2025-06-12 RX ORDER — SENNA AND DOCUSATE SODIUM 50; 8.6 MG/1; MG/1
1 TABLET, FILM COATED ORAL 2 TIMES DAILY
Status: DISCONTINUED | OUTPATIENT
Start: 2025-06-12 | End: 2025-06-13

## 2025-06-12 RX ORDER — TRAMADOL HYDROCHLORIDE 50 MG/1
50 TABLET ORAL EVERY 6 HOURS PRN
Status: DISCONTINUED | OUTPATIENT
Start: 2025-06-12 | End: 2025-06-13

## 2025-06-12 RX ORDER — POLYETHYLENE GLYCOL 3350 17 G/17G
17 POWDER, FOR SOLUTION ORAL DAILY
Status: DISCONTINUED | OUTPATIENT
Start: 2025-06-13 | End: 2025-06-13

## 2025-06-12 RX ADMIN — DEXMEDETOMIDINE 0.2 MCG/KG/HR: 100 INJECTION, SOLUTION INTRAVENOUS at 15:28

## 2025-06-12 RX ADMIN — DEXMEDETOMIDINE 0.7 MCG/KG/HR: 100 INJECTION, SOLUTION INTRAVENOUS at 21:51

## 2025-06-12 RX ADMIN — ACETAMINOPHEN 650 MG: 325 TABLET ORAL at 13:00

## 2025-06-12 RX ADMIN — AMIODARONE HYDROCHLORIDE 200 MG: 200 TABLET ORAL at 08:18

## 2025-06-12 RX ADMIN — Medication 1 AMPULE: at 20:22

## 2025-06-12 RX ADMIN — ARFORMOTEROL TARTRATE 15 MCG: 15 SOLUTION RESPIRATORY (INHALATION) at 19:35

## 2025-06-12 RX ADMIN — METOPROLOL TARTRATE 12.5 MG: 25 TABLET, FILM COATED ORAL at 08:18

## 2025-06-12 RX ADMIN — HYDROMORPHONE HYDROCHLORIDE 0.25 MG: 1 INJECTION, SOLUTION INTRAMUSCULAR; INTRAVENOUS; SUBCUTANEOUS at 02:24

## 2025-06-12 RX ADMIN — IPRATROPIUM BROMIDE 0.5 MG: 0.5 SOLUTION RESPIRATORY (INHALATION) at 08:56

## 2025-06-12 RX ADMIN — IPRATROPIUM BROMIDE 0.5 MG: 0.5 SOLUTION RESPIRATORY (INHALATION) at 19:35

## 2025-06-12 RX ADMIN — BUDESONIDE INHALATION 500 MCG: 0.5 SUSPENSION RESPIRATORY (INHALATION) at 19:35

## 2025-06-12 RX ADMIN — ACETAMINOPHEN 650 MG: 325 TABLET ORAL at 18:28

## 2025-06-12 RX ADMIN — Medication 1 AMPULE: at 09:00

## 2025-06-12 RX ADMIN — INSULIN LISPRO 2 UNITS: 100 INJECTION, SOLUTION INTRAVENOUS; SUBCUTANEOUS at 19:35

## 2025-06-12 RX ADMIN — TRAMADOL HYDROCHLORIDE 50 MG: 50 TABLET, COATED ORAL at 14:28

## 2025-06-12 RX ADMIN — SODIUM CHLORIDE, PRESERVATIVE FREE 10 ML: 5 INJECTION INTRAVENOUS at 19:57

## 2025-06-12 RX ADMIN — CEFAZOLIN 2000 MG: 10 INJECTION, POWDER, FOR SOLUTION INTRAVENOUS at 04:07

## 2025-06-12 RX ADMIN — ACETAMINOPHEN 650 MG: 325 TABLET ORAL at 05:13

## 2025-06-12 RX ADMIN — HYDROMORPHONE HYDROCHLORIDE 0.25 MG: 1 INJECTION, SOLUTION INTRAMUSCULAR; INTRAVENOUS; SUBCUTANEOUS at 08:15

## 2025-06-12 RX ADMIN — ASPIRIN 81 MG: 81 TABLET ORAL at 08:19

## 2025-06-12 RX ADMIN — ALBUTEROL SULFATE 2.5 MG: 2.5 SOLUTION RESPIRATORY (INHALATION) at 06:01

## 2025-06-12 RX ADMIN — FAMOTIDINE 20 MG: 10 INJECTION, SOLUTION INTRAVENOUS at 17:37

## 2025-06-12 RX ADMIN — INSULIN LISPRO 2 UNITS: 100 INJECTION, SOLUTION INTRAVENOUS; SUBCUTANEOUS at 17:37

## 2025-06-12 RX ADMIN — SODIUM CHLORIDE, PRESERVATIVE FREE 10 ML: 5 INJECTION INTRAVENOUS at 08:25

## 2025-06-12 RX ADMIN — BUDESONIDE INHALATION 500 MCG: 0.5 SUSPENSION RESPIRATORY (INHALATION) at 08:56

## 2025-06-12 RX ADMIN — ARFORMOTEROL TARTRATE 15 MCG: 15 SOLUTION RESPIRATORY (INHALATION) at 08:56

## 2025-06-12 RX ADMIN — TRAMADOL HYDROCHLORIDE 50 MG: 50 TABLET, COATED ORAL at 08:23

## 2025-06-12 ASSESSMENT — PAIN DESCRIPTION - DESCRIPTORS
DESCRIPTORS: ACHING;THROBBING
DESCRIPTORS: ACHING;SORE;SHARP
DESCRIPTORS: ACHING;SORE;SHARP

## 2025-06-12 ASSESSMENT — PAIN SCALES - GENERAL
PAINLEVEL_OUTOF10: 10
PAINLEVEL_OUTOF10: 10
PAINLEVEL_OUTOF10: 5

## 2025-06-12 ASSESSMENT — PAIN DESCRIPTION - LOCATION
LOCATION: CHEST;INCISION;MEDIASTINUM
LOCATION: CHEST;INCISION
LOCATION: CHEST;INCISION

## 2025-06-12 ASSESSMENT — PAIN DESCRIPTION - ORIENTATION
ORIENTATION: MID;ANTERIOR
ORIENTATION: MID;ANTERIOR

## 2025-06-12 ASSESSMENT — PAIN SCALES - WONG BAKER: WONGBAKER_NUMERICALRESPONSE: NO HURT

## 2025-06-12 ASSESSMENT — PAIN - FUNCTIONAL ASSESSMENT
PAIN_FUNCTIONAL_ASSESSMENT: PREVENTS OR INTERFERES SOME ACTIVE ACTIVITIES AND ADLS
PAIN_FUNCTIONAL_ASSESSMENT: ACTIVITIES ARE NOT PREVENTED

## 2025-06-12 ASSESSMENT — PAIN DESCRIPTION - FREQUENCY: FREQUENCY: CONTINUOUS

## 2025-06-12 ASSESSMENT — PAIN DESCRIPTION - ONSET: ONSET: ON-GOING

## 2025-06-12 ASSESSMENT — PAIN DESCRIPTION - PAIN TYPE: TYPE: ACUTE PAIN

## 2025-06-12 NOTE — CARE COORDINATION
CM spoke to patient at bedside on this day. Patient confirmed demographic information and insurance information. Patient reports that he lives with his spouse, in a 1 story house and has no steps to enter home. Patient reports that he is independent with ADLs, uses a rolling walker, and is an active . Patient has no home care services. Patient confirmed PCP information and last PCP visit within the last 3 months. PT eval is pending.     No CM needs voiced or noted. CM will continue to follow patient for any discharge planning needs.     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 (HCC) [I25.110]  Chest pain [R07.9]  Tobacco abuse [Z72.0]  Chronic systolic heart failure (HCC) [I50.22]  CAD, multiple vessel [I25.10]  Date/Time of Admission: 6/11/2025  5:04 AM  Problem List:  Patient Active Problem List   Diagnosis    Type 2 diabetes mellitus with diabetic polyneuropathy, without long-term current use of insulin (HCC)    CAD in native artery    Elevated alkaline phosphatase level    Radiologic findings of lung field, abnormal    COPD (chronic obstructive pulmonary disease) ([facility])    CKD stage 3b, GFR 30-44 ml/min (HCC)    Tobacco abuse    Syncope and collapse    Sinus tachycardia    Basal cell carcinoma of scalp    Incarcerated left inguinal hernia    Hypertension    Syncope    Hiatal hernia    Hyperlipidemia    Renal cyst, left    Bilateral kidney stones    Olecranon bursitis of right elbow    Carotid atherosclerosis, bilateral    MOONEY (dyspnea on exertion)    Bilateral lower extremity edema    Chest pain    Chronic systolic congestive heart failure (HCC)    Atherosclerotic heart disease of native coronary artery with unstable angina pectoris (HCC)    Chronic systolic heart failure (HCC)    CAD, multiple vessel    S/P CABG x 3    Atelectasis    Chronic renal failure    Pre-op testing

## 2025-06-12 NOTE — INTERDISCIPLINARY ROUNDS
Multi-D Rounds/Checklist (leapfrog):  Lines: can any be removed?: None    Chest Tube Left Pleural 1 (Active)       Chest Tube Anterior Mediastinal 2 (Active)       Urinary Catheter 06/11/25 Matos-Temperature (Active)     Introducer 06/11/25 (Active)       Arterial Line 06/11/25 Radial (Active)       Pacing Wires (Active)       CVC  06/11/25 Right Internal jugular (Active)     DVT Prophylaxis: Ordered  Vent: N/A  Nutrition Ordered/appropriate: Contraindicated- pending speech eval  Can antibiotics or other drugs be stopped? Yes/End Date set Yes/No  MRSA swab:   Inpat Anti-Infectives (From admission, onward)       Start     Ordered Stop    06/11/25 1930  ceFAZolin (ANCEF) 2000 mg in sterile water 20 mL IV syringe  2,000 mg,   IntraVENous,   EVERY 8 HOURS        Note to Pharmacy: Default Settings:Auto-dosed by Weight Q8h x 2 doses  Auto-dosed: Pt Wt<119.9kg-2gm, >120kg-3gm    06/11/25 1201 06/13/25 1129                  Consults needed: None  A: Is pain control adequate? (has PRNs? Stop drip?) Yes  B: Sedation break and SBT? N/A  C: Is sedation choice appropriate? N/A  D: Delirium/CAM-ICU? No  E: Mobility goals/appropriateness? Yes  F: Family update and plan? wife is surrogate decision maker and is being updated daily by primary attending and nursing staff.    Glenny Guerrero, APRN - CNP

## 2025-06-13 ENCOUNTER — APPOINTMENT (OUTPATIENT)
Dept: ULTRASOUND IMAGING | Age: 80
DRG: 235 | End: 2025-06-13
Attending: THORACIC SURGERY (CARDIOTHORACIC VASCULAR SURGERY)
Payer: MEDICARE

## 2025-06-13 ENCOUNTER — APPOINTMENT (OUTPATIENT)
Dept: GENERAL RADIOLOGY | Age: 80
DRG: 235 | End: 2025-06-13
Attending: THORACIC SURGERY (CARDIOTHORACIC VASCULAR SURGERY)
Payer: MEDICARE

## 2025-06-13 LAB
ABO + RH BLD: NORMAL
AMORPH CRY URNS QL MICRO: ABNORMAL
ANION GAP SERPL CALC-SCNC: 11 MMOL/L (ref 7–16)
APPEARANCE UR: CLEAR
BACTERIA URNS QL MICRO: 0 /HPF
BILIRUB UR QL: NEGATIVE
BLD PROD TYP BPU: NORMAL
BLOOD BANK DISPENSE STATUS: NORMAL
BLOOD GROUP ANTIBODIES SERPL: NORMAL
BPU ID: NORMAL
BUN SERPL-MCNC: 42 MG/DL (ref 8–23)
CALCIUM SERPL-MCNC: 8.8 MG/DL (ref 8.8–10.2)
CHLORIDE SERPL-SCNC: 109 MMOL/L (ref 98–107)
CO2 SERPL-SCNC: 21 MMOL/L (ref 20–29)
COLOR UR: ABNORMAL
CREAT SERPL-MCNC: 1.84 MG/DL (ref 0.8–1.3)
CROSSMATCH RESULT: NORMAL
EKG ATRIAL RATE: 57 BPM
EKG DIAGNOSIS: NORMAL
EKG P AXIS: 24 DEGREES
EKG P-R INTERVAL: 142 MS
EKG Q-T INTERVAL: 458 MS
EKG QRS DURATION: 88 MS
EKG QTC CALCULATION (BAZETT): 445 MS
EKG R AXIS: -17 DEGREES
EKG T AXIS: 10 DEGREES
EKG VENTRICULAR RATE: 57 BPM
EPI CELLS #/AREA URNS HPF: ABNORMAL /HPF
ERYTHROCYTE [DISTWIDTH] IN BLOOD BY AUTOMATED COUNT: 14 % (ref 11.9–14.6)
GLUCOSE BLD STRIP.AUTO-MCNC: 143 MG/DL (ref 65–100)
GLUCOSE BLD STRIP.AUTO-MCNC: 146 MG/DL (ref 65–100)
GLUCOSE BLD STRIP.AUTO-MCNC: 166 MG/DL (ref 65–100)
GLUCOSE BLD STRIP.AUTO-MCNC: 189 MG/DL (ref 65–100)
GLUCOSE SERPL-MCNC: 172 MG/DL (ref 70–99)
GLUCOSE UR STRIP.AUTO-MCNC: 500 MG/DL
HCT VFR BLD AUTO: 40.8 % (ref 41.1–50.3)
HGB BLD-MCNC: 13.2 G/DL (ref 13.6–17.2)
HGB UR QL STRIP: ABNORMAL
KETONES UR QL STRIP.AUTO: NEGATIVE MG/DL
LEUKOCYTE ESTERASE UR QL STRIP.AUTO: NEGATIVE
MAGNESIUM SERPL-MCNC: 2.8 MG/DL (ref 1.8–2.4)
MCH RBC QN AUTO: 32.9 PG (ref 26.1–32.9)
MCHC RBC AUTO-ENTMCNC: 32.4 G/DL (ref 31.4–35)
MCV RBC AUTO: 101.7 FL (ref 82–102)
MM INDURATION, POC: 0 MM (ref 0–5)
NITRITE UR QL STRIP.AUTO: NEGATIVE
NRBC # BLD: 0 K/UL (ref 0–0.2)
OTHER OBSERVATIONS: ABNORMAL
PH UR STRIP: 5 (ref 5–9)
PLATELET # BLD AUTO: 147 K/UL (ref 150–450)
PMV BLD AUTO: 11.1 FL (ref 9.4–12.3)
POTASSIUM SERPL-SCNC: 5.1 MMOL/L (ref 3.5–5.1)
PPD, POC: NEGATIVE
PROT UR STRIP-MCNC: NEGATIVE MG/DL
RBC # BLD AUTO: 4.01 M/UL (ref 4.23–5.6)
RBC #/AREA URNS HPF: ABNORMAL /HPF
SERVICE CMNT-IMP: ABNORMAL
SODIUM SERPL-SCNC: 141 MMOL/L (ref 136–145)
SODIUM UR-SCNC: 109 MMOL/L
SP GR UR REFRACTOMETRY: 1.01 (ref 1–1.02)
SPECIMEN EXP DATE BLD: NORMAL
UNIT DIVISION: 0
UROBILINOGEN UR QL STRIP.AUTO: 0.2 EU/DL (ref 0.2–1)
WBC # BLD AUTO: 14.3 K/UL (ref 4.3–11.1)
WBC URNS QL MICRO: ABNORMAL /HPF

## 2025-06-13 PROCEDURE — 6360000002 HC RX W HCPCS: Performed by: INTERNAL MEDICINE

## 2025-06-13 PROCEDURE — 82962 GLUCOSE BLOOD TEST: CPT

## 2025-06-13 PROCEDURE — 81001 URINALYSIS AUTO W/SCOPE: CPT

## 2025-06-13 PROCEDURE — 6370000000 HC RX 637 (ALT 250 FOR IP): Performed by: PHYSICIAN ASSISTANT

## 2025-06-13 PROCEDURE — 93005 ELECTROCARDIOGRAM TRACING: CPT | Performed by: PHYSICIAN ASSISTANT

## 2025-06-13 PROCEDURE — 71045 X-RAY EXAM CHEST 1 VIEW: CPT

## 2025-06-13 PROCEDURE — 94760 N-INVAS EAR/PLS OXIMETRY 1: CPT

## 2025-06-13 PROCEDURE — 2500000003 HC RX 250 WO HCPCS: Performed by: SURGERY

## 2025-06-13 PROCEDURE — 85027 COMPLETE CBC AUTOMATED: CPT

## 2025-06-13 PROCEDURE — 94761 N-INVAS EAR/PLS OXIMETRY MLT: CPT

## 2025-06-13 PROCEDURE — 6360000002 HC RX W HCPCS: Performed by: STUDENT IN AN ORGANIZED HEALTH CARE EDUCATION/TRAINING PROGRAM

## 2025-06-13 PROCEDURE — 76770 US EXAM ABDO BACK WALL COMP: CPT

## 2025-06-13 PROCEDURE — 2580000003 HC RX 258: Performed by: PHYSICIAN ASSISTANT

## 2025-06-13 PROCEDURE — 93010 ELECTROCARDIOGRAM REPORT: CPT | Performed by: INTERNAL MEDICINE

## 2025-06-13 PROCEDURE — 2500000003 HC RX 250 WO HCPCS: Performed by: PHYSICIAN ASSISTANT

## 2025-06-13 PROCEDURE — 6360000002 HC RX W HCPCS: Performed by: PHYSICIAN ASSISTANT

## 2025-06-13 PROCEDURE — 5A0935A ASSISTANCE WITH RESPIRATORY VENTILATION, LESS THAN 24 CONSECUTIVE HOURS, HIGH NASAL FLOW/VELOCITY: ICD-10-PCS | Performed by: INTERNAL MEDICINE

## 2025-06-13 PROCEDURE — 2140000001 HC CVICU INTERMEDIATE R&B

## 2025-06-13 PROCEDURE — 83735 ASSAY OF MAGNESIUM: CPT

## 2025-06-13 PROCEDURE — 99232 SBSQ HOSP IP/OBS MODERATE 35: CPT | Performed by: INTERNAL MEDICINE

## 2025-06-13 PROCEDURE — 2580000003 HC RX 258: Performed by: SURGERY

## 2025-06-13 PROCEDURE — 94640 AIRWAY INHALATION TREATMENT: CPT

## 2025-06-13 PROCEDURE — 2700000000 HC OXYGEN THERAPY PER DAY

## 2025-06-13 PROCEDURE — 84300 ASSAY OF URINE SODIUM: CPT

## 2025-06-13 PROCEDURE — 36592 COLLECT BLOOD FROM PICC: CPT

## 2025-06-13 PROCEDURE — 97161 PT EVAL LOW COMPLEX 20 MIN: CPT

## 2025-06-13 PROCEDURE — 92526 ORAL FUNCTION THERAPY: CPT

## 2025-06-13 PROCEDURE — 97530 THERAPEUTIC ACTIVITIES: CPT

## 2025-06-13 PROCEDURE — 80048 BASIC METABOLIC PNL TOTAL CA: CPT

## 2025-06-13 PROCEDURE — 6370000000 HC RX 637 (ALT 250 FOR IP): Performed by: THORACIC SURGERY (CARDIOTHORACIC VASCULAR SURGERY)

## 2025-06-13 RX ORDER — ALLOPURINOL 300 MG/1
TABLET ORAL 3 TIMES DAILY PRN
Status: DISCONTINUED | OUTPATIENT
Start: 2025-06-13 | End: 2025-06-17 | Stop reason: HOSPADM

## 2025-06-13 RX ORDER — LANOLIN ALCOHOL/MO/W.PET/CERES
400 CREAM (GRAM) TOPICAL 3 TIMES DAILY PRN
Status: DISCONTINUED | OUTPATIENT
Start: 2025-06-13 | End: 2025-06-17 | Stop reason: HOSPADM

## 2025-06-13 RX ORDER — ONDANSETRON 4 MG/1
4 TABLET, ORALLY DISINTEGRATING ORAL EVERY 8 HOURS PRN
Status: DISCONTINUED | OUTPATIENT
Start: 2025-06-13 | End: 2025-06-15

## 2025-06-13 RX ORDER — SODIUM CHLORIDE 0.9 % (FLUSH) 0.9 %
5-40 SYRINGE (ML) INJECTION EVERY 12 HOURS SCHEDULED
Status: DISCONTINUED | OUTPATIENT
Start: 2025-06-13 | End: 2025-06-15

## 2025-06-13 RX ORDER — LANOLIN ALCOHOL/MO/W.PET/CERES
400 CREAM (GRAM) TOPICAL 4 TIMES DAILY PRN
Status: DISCONTINUED | OUTPATIENT
Start: 2025-06-13 | End: 2025-06-17 | Stop reason: HOSPADM

## 2025-06-13 RX ORDER — SENNOSIDES 8.6 MG/1
1 TABLET ORAL 2 TIMES DAILY PRN
Status: DISCONTINUED | OUTPATIENT
Start: 2025-06-13 | End: 2025-06-15 | Stop reason: SDUPTHER

## 2025-06-13 RX ORDER — ONDANSETRON 2 MG/ML
4 INJECTION INTRAMUSCULAR; INTRAVENOUS EVERY 6 HOURS PRN
Status: DISCONTINUED | OUTPATIENT
Start: 2025-06-13 | End: 2025-06-15

## 2025-06-13 RX ORDER — TAMSULOSIN HYDROCHLORIDE 0.4 MG/1
0.4 CAPSULE ORAL DAILY
Status: DISCONTINUED | OUTPATIENT
Start: 2025-06-14 | End: 2025-06-17 | Stop reason: HOSPADM

## 2025-06-13 RX ORDER — INSULIN LISPRO 100 [IU]/ML
0-12 INJECTION, SOLUTION INTRAVENOUS; SUBCUTANEOUS
Status: DISCONTINUED | OUTPATIENT
Start: 2025-06-13 | End: 2025-06-15

## 2025-06-13 RX ORDER — TRAMADOL HYDROCHLORIDE 50 MG/1
50 TABLET ORAL EVERY 6 HOURS PRN
Status: DISCONTINUED | OUTPATIENT
Start: 2025-06-13 | End: 2025-06-17 | Stop reason: HOSPADM

## 2025-06-13 RX ORDER — FUROSEMIDE 10 MG/ML
40 INJECTION INTRAMUSCULAR; INTRAVENOUS 2 TIMES DAILY
Status: DISCONTINUED | OUTPATIENT
Start: 2025-06-13 | End: 2025-06-14

## 2025-06-13 RX ORDER — SODIUM CHLORIDE 0.9 % (FLUSH) 0.9 %
5-40 SYRINGE (ML) INJECTION PRN
Status: DISCONTINUED | OUTPATIENT
Start: 2025-06-13 | End: 2025-06-15

## 2025-06-13 RX ORDER — DEXTROSE MONOHYDRATE 100 MG/ML
INJECTION, SOLUTION INTRAVENOUS CONTINUOUS PRN
Status: DISCONTINUED | OUTPATIENT
Start: 2025-06-13 | End: 2025-06-15

## 2025-06-13 RX ORDER — DILTIAZEM HYDROCHLORIDE 5 MG/ML
10 INJECTION INTRAVENOUS ONCE
Status: COMPLETED | OUTPATIENT
Start: 2025-06-13 | End: 2025-06-13

## 2025-06-13 RX ORDER — POTASSIUM CHLORIDE 1500 MG/1
20 TABLET, EXTENDED RELEASE ORAL 2 TIMES DAILY PRN
Status: DISCONTINUED | OUTPATIENT
Start: 2025-06-13 | End: 2025-06-17 | Stop reason: HOSPADM

## 2025-06-13 RX ORDER — POLYETHYLENE GLYCOL 3350 17 G/17G
17 POWDER, FOR SOLUTION ORAL DAILY
Status: DISCONTINUED | OUTPATIENT
Start: 2025-06-14 | End: 2025-06-15

## 2025-06-13 RX ORDER — FAMOTIDINE 20 MG/1
20 TABLET, FILM COATED ORAL 2 TIMES DAILY
Status: DISCONTINUED | OUTPATIENT
Start: 2025-06-13 | End: 2025-06-15

## 2025-06-13 RX ORDER — INSULIN LISPRO 100 [IU]/ML
0-6 INJECTION, SOLUTION INTRAVENOUS; SUBCUTANEOUS NIGHTLY
Status: DISCONTINUED | OUTPATIENT
Start: 2025-06-13 | End: 2025-06-15

## 2025-06-13 RX ORDER — GABAPENTIN 100 MG/1
300 CAPSULE ORAL 3 TIMES DAILY
Status: DISCONTINUED | OUTPATIENT
Start: 2025-06-13 | End: 2025-06-17 | Stop reason: HOSPADM

## 2025-06-13 RX ORDER — POTASSIUM CHLORIDE 1500 MG/1
40 TABLET, EXTENDED RELEASE ORAL 2 TIMES DAILY PRN
Status: DISCONTINUED | OUTPATIENT
Start: 2025-06-13 | End: 2025-06-17 | Stop reason: HOSPADM

## 2025-06-13 RX ADMIN — Medication 1 AMPULE: at 09:05

## 2025-06-13 RX ADMIN — SODIUM CHLORIDE 2.5 MG/HR: 900 INJECTION, SOLUTION INTRAVENOUS at 22:06

## 2025-06-13 RX ADMIN — SODIUM CHLORIDE, PRESERVATIVE FREE 10 ML: 5 INJECTION INTRAVENOUS at 20:07

## 2025-06-13 RX ADMIN — GABAPENTIN 300 MG: 100 CAPSULE ORAL at 20:05

## 2025-06-13 RX ADMIN — DOCUSATE SODIUM 50 MG AND SENNOSIDES 8.6 MG 1 TABLET: 8.6; 5 TABLET, FILM COATED ORAL at 09:04

## 2025-06-13 RX ADMIN — FUROSEMIDE 40 MG: 10 INJECTION, SOLUTION INTRAMUSCULAR; INTRAVENOUS at 19:05

## 2025-06-13 RX ADMIN — FUROSEMIDE 40 MG: 10 INJECTION, SOLUTION INTRAMUSCULAR; INTRAVENOUS at 12:34

## 2025-06-13 RX ADMIN — TRAMADOL HYDROCHLORIDE 50 MG: 50 TABLET, COATED ORAL at 19:04

## 2025-06-13 RX ADMIN — IPRATROPIUM BROMIDE 0.5 MG: 0.5 SOLUTION RESPIRATORY (INHALATION) at 19:45

## 2025-06-13 RX ADMIN — Medication 6 MG: at 20:05

## 2025-06-13 RX ADMIN — ASPIRIN 81 MG: 81 TABLET ORAL at 09:04

## 2025-06-13 RX ADMIN — BUDESONIDE INHALATION 500 MCG: 0.5 SUSPENSION RESPIRATORY (INHALATION) at 08:47

## 2025-06-13 RX ADMIN — METOPROLOL TARTRATE 12.5 MG: 25 TABLET, FILM COATED ORAL at 09:05

## 2025-06-13 RX ADMIN — DEXMEDETOMIDINE 1.4 MCG/KG/HR: 100 INJECTION, SOLUTION INTRAVENOUS at 02:29

## 2025-06-13 RX ADMIN — METOPROLOL TARTRATE 12.5 MG: 25 TABLET, FILM COATED ORAL at 20:05

## 2025-06-13 RX ADMIN — ARFORMOTEROL TARTRATE 15 MCG: 15 SOLUTION RESPIRATORY (INHALATION) at 08:47

## 2025-06-13 RX ADMIN — INSULIN LISPRO 2 UNITS: 100 INJECTION, SOLUTION INTRAVENOUS; SUBCUTANEOUS at 08:00

## 2025-06-13 RX ADMIN — BUDESONIDE INHALATION 500 MCG: 0.5 SUSPENSION RESPIRATORY (INHALATION) at 19:45

## 2025-06-13 RX ADMIN — FAMOTIDINE 20 MG: 20 TABLET, FILM COATED ORAL at 20:05

## 2025-06-13 RX ADMIN — AMIODARONE HYDROCHLORIDE 200 MG: 200 TABLET ORAL at 09:04

## 2025-06-13 RX ADMIN — ACETAMINOPHEN 650 MG: 325 TABLET ORAL at 11:31

## 2025-06-13 RX ADMIN — AMIODARONE HYDROCHLORIDE 200 MG: 200 TABLET ORAL at 20:05

## 2025-06-13 RX ADMIN — ACETAMINOPHEN 650 MG: 325 TABLET ORAL at 06:12

## 2025-06-13 RX ADMIN — Medication 1 AMPULE: at 20:05

## 2025-06-13 RX ADMIN — DILTIAZEM HYDROCHLORIDE 10 MG: 5 INJECTION, SOLUTION INTRAVENOUS at 22:03

## 2025-06-13 RX ADMIN — ARFORMOTEROL TARTRATE 15 MCG: 15 SOLUTION RESPIRATORY (INHALATION) at 19:45

## 2025-06-13 RX ADMIN — ACETAMINOPHEN 650 MG: 325 TABLET ORAL at 23:16

## 2025-06-13 RX ADMIN — ACETAMINOPHEN 650 MG: 325 TABLET ORAL at 19:04

## 2025-06-13 RX ADMIN — IPRATROPIUM BROMIDE 0.5 MG: 0.5 SOLUTION RESPIRATORY (INHALATION) at 08:47

## 2025-06-13 RX ADMIN — SODIUM CHLORIDE, PRESERVATIVE FREE 10 ML: 5 INJECTION INTRAVENOUS at 09:05

## 2025-06-13 RX ADMIN — MUSCLE RUB CREAM: 100; 150 CREAM TOPICAL at 23:16

## 2025-06-13 RX ADMIN — IPRATROPIUM BROMIDE 0.5 MG: 0.5 SOLUTION RESPIRATORY (INHALATION) at 13:29

## 2025-06-13 RX ADMIN — ATORVASTATIN CALCIUM 80 MG: 80 TABLET, FILM COATED ORAL at 20:05

## 2025-06-13 RX ADMIN — POLYETHYLENE GLYCOL 3350 17 G: 17 POWDER, FOR SOLUTION ORAL at 09:03

## 2025-06-13 RX ADMIN — INSULIN LISPRO 2 UNITS: 100 INJECTION, SOLUTION INTRAVENOUS; SUBCUTANEOUS at 12:34

## 2025-06-13 ASSESSMENT — PAIN SCALES - GENERAL
PAINLEVEL_OUTOF10: 0
PAINLEVEL_OUTOF10: 0
PAINLEVEL_OUTOF10: 3
PAINLEVEL_OUTOF10: 10
PAINLEVEL_OUTOF10: 2
PAINLEVEL_OUTOF10: 3

## 2025-06-13 ASSESSMENT — PAIN DESCRIPTION - LOCATION
LOCATION: INCISION;BACK
LOCATION: BACK;CHEST;INCISION

## 2025-06-13 ASSESSMENT — PAIN DESCRIPTION - DESCRIPTORS
DESCRIPTORS: ACHING;GNAWING
DESCRIPTORS: ACHING;SORE

## 2025-06-13 ASSESSMENT — PAIN DESCRIPTION - ORIENTATION
ORIENTATION: MID
ORIENTATION: RIGHT;LEFT;MID

## 2025-06-13 ASSESSMENT — PAIN DESCRIPTION - PAIN TYPE: TYPE: SURGICAL PAIN;ACUTE PAIN

## 2025-06-13 ASSESSMENT — PAIN - FUNCTIONAL ASSESSMENT: PAIN_FUNCTIONAL_ASSESSMENT: ACTIVITIES ARE NOT PREVENTED

## 2025-06-13 NOTE — CARE COORDINATION
CM reviewed chart.     Patient continues to be in CVICU at this time. PT eval is pending. CM will continue to follow patient for any discharge planning needs that may arise.

## 2025-06-13 NOTE — INTERDISCIPLINARY ROUNDS
Multi-D Rounds/Checklist (leapfrog):  Lines: can any be removed?: Central line and Matos    Chest Tube Left Pleural 1 (Active)       Chest Tube Anterior Mediastinal 2 (Active)       Urinary Catheter 06/11/25 Matos-Temperature (Active)     Introducer 06/11/25 (Active)       Pacing Wires (Active)       CVC  06/11/25 Right Internal jugular (Active)     DVT Prophylaxis: Ordered  Vent: N/A  Nutrition Ordered/appropriate: Ordered  Can antibiotics or other drugs be stopped? Yes/End Date set Yes/No  MRSA swab:   Inpat Anti-Infectives (From admission, onward)       Start     Ordered Stop    06/11/25 1930  ceFAZolin (ANCEF) 2000 mg in sterile water 20 mL IV syringe  2,000 mg,   IntraVENous,   EVERY 8 HOURS        Note to Pharmacy: Default Settings:Auto-dosed by Weight Q8h x 2 doses  Auto-dosed: Pt Wt<119.9kg-2gm, >120kg-3gm    06/11/25 1201 06/13/25 1129                  Consults needed: None  A: Is pain control adequate? (has PRNs? Stop drip?) Yes  B: Sedation break and SBT? N/A  C: Is sedation choice appropriate? N/A  D: Delirium/CAM-ICU? No  E: Mobility goals/appropriateness? Yes  F: Family update and plan? wife is surrogate decision maker and is being updated daily by primary attending and nursing staff.    Glenny Guerrero, APRN - CNP

## 2025-06-14 ENCOUNTER — APPOINTMENT (OUTPATIENT)
Dept: GENERAL RADIOLOGY | Age: 80
DRG: 235 | End: 2025-06-14
Attending: THORACIC SURGERY (CARDIOTHORACIC VASCULAR SURGERY)
Payer: MEDICARE

## 2025-06-14 ENCOUNTER — APPOINTMENT (OUTPATIENT)
Dept: CT IMAGING | Age: 80
DRG: 235 | End: 2025-06-14
Attending: THORACIC SURGERY (CARDIOTHORACIC VASCULAR SURGERY)
Payer: MEDICARE

## 2025-06-14 LAB
ANION GAP SERPL CALC-SCNC: 13 MMOL/L (ref 7–16)
ANION GAP SERPL CALC-SCNC: 13 MMOL/L (ref 7–16)
BUN SERPL-MCNC: 42 MG/DL (ref 8–23)
BUN SERPL-MCNC: 46 MG/DL (ref 8–23)
CALCIUM SERPL-MCNC: 8.6 MG/DL (ref 8.8–10.2)
CALCIUM SERPL-MCNC: 9.2 MG/DL (ref 8.8–10.2)
CHLORIDE SERPL-SCNC: 103 MMOL/L (ref 98–107)
CHLORIDE SERPL-SCNC: 104 MMOL/L (ref 98–107)
CO2 SERPL-SCNC: 23 MMOL/L (ref 20–29)
CO2 SERPL-SCNC: 23 MMOL/L (ref 20–29)
CREAT SERPL-MCNC: 1.69 MG/DL (ref 0.8–1.3)
CREAT SERPL-MCNC: 2.06 MG/DL (ref 0.8–1.3)
ERYTHROCYTE [DISTWIDTH] IN BLOOD BY AUTOMATED COUNT: 14 % (ref 11.9–14.6)
ERYTHROCYTE [DISTWIDTH] IN BLOOD BY AUTOMATED COUNT: 14 % (ref 11.9–14.6)
GLUCOSE BLD STRIP.AUTO-MCNC: 145 MG/DL (ref 65–100)
GLUCOSE BLD STRIP.AUTO-MCNC: 156 MG/DL (ref 65–100)
GLUCOSE BLD STRIP.AUTO-MCNC: 205 MG/DL (ref 65–100)
GLUCOSE BLD STRIP.AUTO-MCNC: 226 MG/DL (ref 65–100)
GLUCOSE SERPL-MCNC: 162 MG/DL (ref 70–99)
GLUCOSE SERPL-MCNC: 245 MG/DL (ref 70–99)
HCT VFR BLD AUTO: 37.5 % (ref 41.1–50.3)
HCT VFR BLD AUTO: 43 % (ref 41.1–50.3)
HGB BLD-MCNC: 12.5 G/DL (ref 13.6–17.2)
HGB BLD-MCNC: 14.4 G/DL (ref 13.6–17.2)
LACTATE SERPL-SCNC: 2.6 MMOL/L (ref 0.5–2)
MAGNESIUM SERPL-MCNC: 2.4 MG/DL (ref 1.8–2.4)
MCH RBC QN AUTO: 32.7 PG (ref 26.1–32.9)
MCH RBC QN AUTO: 32.8 PG (ref 26.1–32.9)
MCHC RBC AUTO-ENTMCNC: 33.3 G/DL (ref 31.4–35)
MCHC RBC AUTO-ENTMCNC: 33.5 G/DL (ref 31.4–35)
MCV RBC AUTO: 97.9 FL (ref 82–102)
MCV RBC AUTO: 98.2 FL (ref 82–102)
NRBC # BLD: 0 K/UL (ref 0–0.2)
NRBC # BLD: 0 K/UL (ref 0–0.2)
PLATELET # BLD AUTO: 147 K/UL (ref 150–450)
PLATELET # BLD AUTO: 153 K/UL (ref 150–450)
PMV BLD AUTO: 10.9 FL (ref 9.4–12.3)
PMV BLD AUTO: 11.2 FL (ref 9.4–12.3)
POTASSIUM SERPL-SCNC: 4.2 MMOL/L (ref 3.5–5.1)
POTASSIUM SERPL-SCNC: ABNORMAL MMOL/L (ref 3.5–5.1)
RBC # BLD AUTO: 3.82 M/UL (ref 4.23–5.6)
RBC # BLD AUTO: 4.39 M/UL (ref 4.23–5.6)
SERVICE CMNT-IMP: ABNORMAL
SODIUM SERPL-SCNC: 139 MMOL/L (ref 136–145)
SODIUM SERPL-SCNC: 140 MMOL/L (ref 136–145)
WBC # BLD AUTO: 12.9 K/UL (ref 4.3–11.1)
WBC # BLD AUTO: 14 K/UL (ref 4.3–11.1)

## 2025-06-14 PROCEDURE — 71250 CT THORAX DX C-: CPT

## 2025-06-14 PROCEDURE — 6360000002 HC RX W HCPCS: Performed by: PHYSICIAN ASSISTANT

## 2025-06-14 PROCEDURE — 2709999900 HC NON-CHARGEABLE SUPPLY

## 2025-06-14 PROCEDURE — 93005 ELECTROCARDIOGRAM TRACING: CPT | Performed by: SURGERY

## 2025-06-14 PROCEDURE — 2580000003 HC RX 258: Performed by: SURGERY

## 2025-06-14 PROCEDURE — 71045 X-RAY EXAM CHEST 1 VIEW: CPT

## 2025-06-14 PROCEDURE — 6370000000 HC RX 637 (ALT 250 FOR IP): Performed by: PHYSICIAN ASSISTANT

## 2025-06-14 PROCEDURE — 94640 AIRWAY INHALATION TREATMENT: CPT

## 2025-06-14 PROCEDURE — 83605 ASSAY OF LACTIC ACID: CPT

## 2025-06-14 PROCEDURE — 2500000003 HC RX 250 WO HCPCS: Performed by: PHYSICIAN ASSISTANT

## 2025-06-14 PROCEDURE — 71046 X-RAY EXAM CHEST 2 VIEWS: CPT

## 2025-06-14 PROCEDURE — 87040 BLOOD CULTURE FOR BACTERIA: CPT

## 2025-06-14 PROCEDURE — 97530 THERAPEUTIC ACTIVITIES: CPT

## 2025-06-14 PROCEDURE — P9045 ALBUMIN (HUMAN), 5%, 250 ML: HCPCS | Performed by: SURGERY

## 2025-06-14 PROCEDURE — 80048 BASIC METABOLIC PNL TOTAL CA: CPT

## 2025-06-14 PROCEDURE — 6370000000 HC RX 637 (ALT 250 FOR IP): Performed by: SURGERY

## 2025-06-14 PROCEDURE — 6360000002 HC RX W HCPCS: Performed by: SURGERY

## 2025-06-14 PROCEDURE — 2700000000 HC OXYGEN THERAPY PER DAY

## 2025-06-14 PROCEDURE — 6370000000 HC RX 637 (ALT 250 FOR IP): Performed by: INTERNAL MEDICINE

## 2025-06-14 PROCEDURE — 36415 COLL VENOUS BLD VENIPUNCTURE: CPT

## 2025-06-14 PROCEDURE — 94760 N-INVAS EAR/PLS OXIMETRY 1: CPT

## 2025-06-14 PROCEDURE — 6370000000 HC RX 637 (ALT 250 FOR IP): Performed by: NURSE PRACTITIONER

## 2025-06-14 PROCEDURE — 82962 GLUCOSE BLOOD TEST: CPT

## 2025-06-14 PROCEDURE — 2100000001 HC CVICU R&B

## 2025-06-14 PROCEDURE — 83735 ASSAY OF MAGNESIUM: CPT

## 2025-06-14 PROCEDURE — 94664 DEMO&/EVAL PT USE INHALER: CPT

## 2025-06-14 PROCEDURE — 99232 SBSQ HOSP IP/OBS MODERATE 35: CPT | Performed by: INTERNAL MEDICINE

## 2025-06-14 PROCEDURE — 85027 COMPLETE CBC AUTOMATED: CPT

## 2025-06-14 RX ORDER — GUAIFENESIN 600 MG/1
1200 TABLET, EXTENDED RELEASE ORAL 2 TIMES DAILY
Status: DISCONTINUED | OUTPATIENT
Start: 2025-06-14 | End: 2025-06-17 | Stop reason: HOSPADM

## 2025-06-14 RX ORDER — AMIODARONE HYDROCHLORIDE 200 MG/1
200 TABLET ORAL 2 TIMES DAILY
Status: DISCONTINUED | OUTPATIENT
Start: 2025-06-14 | End: 2025-06-16

## 2025-06-14 RX ORDER — ACETAMINOPHEN 500 MG
1000 TABLET ORAL EVERY 6 HOURS
Status: DISCONTINUED | OUTPATIENT
Start: 2025-06-14 | End: 2025-06-17 | Stop reason: HOSPADM

## 2025-06-14 RX ORDER — ALBUMIN HUMAN 50 G/1000ML
25 SOLUTION INTRAVENOUS ONCE
Status: COMPLETED | OUTPATIENT
Start: 2025-06-14 | End: 2025-06-14

## 2025-06-14 RX ORDER — SODIUM CHLORIDE, SODIUM LACTATE, POTASSIUM CHLORIDE, AND CALCIUM CHLORIDE .6; .31; .03; .02 G/100ML; G/100ML; G/100ML; G/100ML
500 INJECTION, SOLUTION INTRAVENOUS ONCE
Status: COMPLETED | OUTPATIENT
Start: 2025-06-14 | End: 2025-06-14

## 2025-06-14 RX ORDER — AMIODARONE HYDROCHLORIDE 200 MG/1
400 TABLET ORAL 2 TIMES DAILY
Status: DISCONTINUED | OUTPATIENT
Start: 2025-06-14 | End: 2025-06-14

## 2025-06-14 RX ORDER — FUROSEMIDE 40 MG/1
40 TABLET ORAL 2 TIMES DAILY
Status: DISCONTINUED | OUTPATIENT
Start: 2025-06-14 | End: 2025-06-17 | Stop reason: HOSPADM

## 2025-06-14 RX ADMIN — INSULIN LISPRO 2 UNITS: 100 INJECTION, SOLUTION INTRAVENOUS; SUBCUTANEOUS at 20:29

## 2025-06-14 RX ADMIN — BUDESONIDE INHALATION 500 MCG: 0.5 SUSPENSION RESPIRATORY (INHALATION) at 20:40

## 2025-06-14 RX ADMIN — IPRATROPIUM BROMIDE 0.5 MG: 0.5 SOLUTION RESPIRATORY (INHALATION) at 20:40

## 2025-06-14 RX ADMIN — ACETAMINOPHEN 1000 MG: 500 TABLET, FILM COATED ORAL at 23:07

## 2025-06-14 RX ADMIN — INSULIN LISPRO 1 UNITS: 100 INJECTION, SOLUTION INTRAVENOUS; SUBCUTANEOUS at 16:47

## 2025-06-14 RX ADMIN — FAMOTIDINE 20 MG: 20 TABLET, FILM COATED ORAL at 20:29

## 2025-06-14 RX ADMIN — POLYETHYLENE GLYCOL 3350 17 G: 17 POWDER, FOR SOLUTION ORAL at 09:12

## 2025-06-14 RX ADMIN — TAMSULOSIN HYDROCHLORIDE 0.4 MG: 0.4 CAPSULE ORAL at 09:09

## 2025-06-14 RX ADMIN — GUAIFENESIN 1200 MG: 600 TABLET ORAL at 16:47

## 2025-06-14 RX ADMIN — GABAPENTIN 300 MG: 100 CAPSULE ORAL at 20:28

## 2025-06-14 RX ADMIN — INSULIN LISPRO 2 UNITS: 100 INJECTION, SOLUTION INTRAVENOUS; SUBCUTANEOUS at 09:08

## 2025-06-14 RX ADMIN — SODIUM CHLORIDE, PRESERVATIVE FREE 10 ML: 5 INJECTION INTRAVENOUS at 20:20

## 2025-06-14 RX ADMIN — GUAIFENESIN 1200 MG: 600 TABLET ORAL at 20:28

## 2025-06-14 RX ADMIN — AMIODARONE HYDROCHLORIDE 200 MG: 200 TABLET ORAL at 20:29

## 2025-06-14 RX ADMIN — ATORVASTATIN CALCIUM 80 MG: 80 TABLET, FILM COATED ORAL at 20:29

## 2025-06-14 RX ADMIN — PIPERACILLIN AND TAZOBACTAM 4500 MG: 4; .5 INJECTION, POWDER, LYOPHILIZED, FOR SOLUTION INTRAVENOUS at 22:31

## 2025-06-14 RX ADMIN — FAMOTIDINE 20 MG: 20 TABLET, FILM COATED ORAL at 09:10

## 2025-06-14 RX ADMIN — SODIUM CHLORIDE, PRESERVATIVE FREE 10 ML: 5 INJECTION INTRAVENOUS at 09:10

## 2025-06-14 RX ADMIN — ACETAMINOPHEN 650 MG: 325 TABLET ORAL at 06:25

## 2025-06-14 RX ADMIN — Medication 1 AMPULE: at 20:35

## 2025-06-14 RX ADMIN — Medication 6 MG: at 20:29

## 2025-06-14 RX ADMIN — VANCOMYCIN HYDROCHLORIDE 2000 MG: 10 INJECTION, POWDER, LYOPHILIZED, FOR SOLUTION INTRAVENOUS at 22:27

## 2025-06-14 RX ADMIN — TRAMADOL HYDROCHLORIDE 50 MG: 50 TABLET, COATED ORAL at 09:15

## 2025-06-14 RX ADMIN — ACETAMINOPHEN 1000 MG: 500 TABLET, FILM COATED ORAL at 12:26

## 2025-06-14 RX ADMIN — FUROSEMIDE 40 MG: 40 TABLET ORAL at 12:26

## 2025-06-14 RX ADMIN — ALBUMIN (HUMAN) 25 G: 12.5 INJECTION, SOLUTION INTRAVENOUS at 17:55

## 2025-06-14 RX ADMIN — SODIUM CHLORIDE, SODIUM LACTATE, POTASSIUM CHLORIDE, AND CALCIUM CHLORIDE 500 ML: .6; .31; .03; .02 INJECTION, SOLUTION INTRAVENOUS at 20:19

## 2025-06-14 RX ADMIN — FUROSEMIDE 40 MG: 10 INJECTION, SOLUTION INTRAMUSCULAR; INTRAVENOUS at 09:11

## 2025-06-14 RX ADMIN — METOPROLOL TARTRATE 12.5 MG: 25 TABLET, FILM COATED ORAL at 09:09

## 2025-06-14 RX ADMIN — IPRATROPIUM BROMIDE 0.5 MG: 0.5 SOLUTION RESPIRATORY (INHALATION) at 14:22

## 2025-06-14 RX ADMIN — INSULIN LISPRO 4 UNITS: 100 INJECTION, SOLUTION INTRAVENOUS; SUBCUTANEOUS at 12:26

## 2025-06-14 RX ADMIN — GABAPENTIN 300 MG: 100 CAPSULE ORAL at 09:10

## 2025-06-14 RX ADMIN — AMIODARONE HYDROCHLORIDE 200 MG: 200 TABLET ORAL at 09:09

## 2025-06-14 RX ADMIN — ASPIRIN 81 MG: 81 TABLET ORAL at 09:09

## 2025-06-14 RX ADMIN — Medication 1 AMPULE: at 09:06

## 2025-06-14 RX ADMIN — ACETAMINOPHEN 1000 MG: 500 TABLET, FILM COATED ORAL at 16:48

## 2025-06-14 RX ADMIN — ARFORMOTEROL TARTRATE 15 MCG: 15 SOLUTION RESPIRATORY (INHALATION) at 20:40

## 2025-06-14 ASSESSMENT — PAIN DESCRIPTION - DESCRIPTORS: DESCRIPTORS: ACHING;GNAWING

## 2025-06-14 ASSESSMENT — PAIN SCALES - GENERAL
PAINLEVEL_OUTOF10: 0
PAINLEVEL_OUTOF10: 2
PAINLEVEL_OUTOF10: 8
PAINLEVEL_OUTOF10: 3
PAINLEVEL_OUTOF10: 3

## 2025-06-14 ASSESSMENT — PAIN DESCRIPTION - ORIENTATION: ORIENTATION: MID

## 2025-06-14 ASSESSMENT — PAIN DESCRIPTION - LOCATION: LOCATION: INCISION

## 2025-06-14 ASSESSMENT — PAIN SCALES - WONG BAKER: WONGBAKER_NUMERICALRESPONSE: NO HURT

## 2025-06-15 ENCOUNTER — APPOINTMENT (OUTPATIENT)
Dept: NON INVASIVE DIAGNOSTICS | Age: 80
DRG: 235 | End: 2025-06-15
Attending: SURGERY
Payer: MEDICARE

## 2025-06-15 LAB
ANION GAP SERPL CALC-SCNC: 13 MMOL/L (ref 7–16)
BUN SERPL-MCNC: 43 MG/DL (ref 8–23)
CALCIUM SERPL-MCNC: 8.6 MG/DL (ref 8.8–10.2)
CHLORIDE SERPL-SCNC: 105 MMOL/L (ref 98–107)
CO2 SERPL-SCNC: 24 MMOL/L (ref 20–29)
CREAT SERPL-MCNC: 1.86 MG/DL (ref 0.8–1.3)
ECHO AO ROOT DIAM: 3.3 CM
ECHO AO ROOT INDEX: 1.56 CM/M2
ECHO BSA: 2.16 M2
ECHO LA AREA 2C: 17.4 CM2
ECHO LA AREA 4C: 12.7 CM2
ECHO LA DIAMETER INDEX: 1.51 CM/M2
ECHO LA DIAMETER: 3.2 CM
ECHO LA MAJOR AXIS: 4.9 CM
ECHO LA MINOR AXIS: 5.8 CM
ECHO LA TO AORTIC ROOT RATIO: 0.97
ECHO LA VOL BP: 36 ML (ref 18–58)
ECHO LA VOL MOD A2C: 43 ML (ref 18–58)
ECHO LA VOL MOD A4C: 26 ML (ref 18–58)
ECHO LA VOL/BSA BIPLANE: 17 ML/M2 (ref 16–34)
ECHO LA VOLUME INDEX MOD A2C: 20 ML/M2 (ref 16–34)
ECHO LA VOLUME INDEX MOD A4C: 12 ML/M2 (ref 16–34)
ECHO LV EDV A2C: 58 ML
ECHO LV EDV A4C: 61 ML
ECHO LV EDV INDEX A4C: 29 ML/M2
ECHO LV EDV NDEX A2C: 27 ML/M2
ECHO LV EF PHYSICIAN: 52 %
ECHO LV EJECTION FRACTION A2C: 47 %
ECHO LV EJECTION FRACTION A4C: 55 %
ECHO LV EJECTION FRACTION BIPLANE: 52 % (ref 55–100)
ECHO LV ESV A2C: 30 ML
ECHO LV ESV A4C: 28 ML
ECHO LV ESV INDEX A2C: 14 ML/M2
ECHO LV ESV INDEX A4C: 13 ML/M2
ECHO LV FRACTIONAL SHORTENING: 27 % (ref 28–44)
ECHO LV INTERNAL DIMENSION DIASTOLE INDEX: 2.26 CM/M2
ECHO LV INTERNAL DIMENSION DIASTOLIC: 4.8 CM (ref 4.2–5.9)
ECHO LV INTERNAL DIMENSION SYSTOLIC INDEX: 1.65 CM/M2
ECHO LV INTERNAL DIMENSION SYSTOLIC: 3.5 CM
ECHO LV IVSD: 0.8 CM (ref 0.6–1)
ECHO LV MASS 2D: 137.1 G (ref 88–224)
ECHO LV MASS INDEX 2D: 64.7 G/M2 (ref 49–115)
ECHO LV POSTERIOR WALL DIASTOLIC: 0.9 CM (ref 0.6–1)
ECHO LV RELATIVE WALL THICKNESS RATIO: 0.38
ECHO LVOT AREA: 3.1 CM2
ECHO LVOT DIAM: 2 CM
ECHO PULMONARY ARTERY END DIASTOLIC PRESSURE: 2 MMHG
ECHO PV REGURGITANT END DIASTOLIC MAX VELOCITY: 0.8 M/S
ECHO RA AREA 4C: 15.8 CM2
ECHO RA END SYSTOLIC VOLUME APICAL 4 CHAMBER INDEX BSA: 18 ML/M2
ECHO RA VOLUME: 39 ML
ECHO RV BASAL DIMENSION: 4.2 CM
ECHO RV FREE WALL PEAK S': 7.2 CM/S
ECHO RV TAPSE: 0.7 CM (ref 1.7–?)
ECHO TV REGURGITANT MAX VELOCITY: 1.73 M/S
ECHO TV REGURGITANT PEAK GRADIENT: 12 MMHG
ECHO TV REGURGITANT PEAK GRADIENT: 12 MMHG
EKG ATRIAL RATE: 75 BPM
EKG DIAGNOSIS: NORMAL
EKG P AXIS: 20 DEGREES
EKG P-R INTERVAL: 134 MS
EKG Q-T INTERVAL: 528 MS
EKG QRS DURATION: 88 MS
EKG QTC CALCULATION (BAZETT): 589 MS
EKG R AXIS: -42 DEGREES
EKG T AXIS: 118 DEGREES
EKG VENTRICULAR RATE: 75 BPM
GLUCOSE BLD STRIP.AUTO-MCNC: 135 MG/DL (ref 65–100)
GLUCOSE BLD STRIP.AUTO-MCNC: 176 MG/DL (ref 65–100)
GLUCOSE BLD STRIP.AUTO-MCNC: 185 MG/DL (ref 65–100)
GLUCOSE BLD STRIP.AUTO-MCNC: 195 MG/DL (ref 65–100)
GLUCOSE SERPL-MCNC: 133 MG/DL (ref 70–99)
LACTATE SERPL-SCNC: 1.3 MMOL/L (ref 0.5–2)
MAGNESIUM SERPL-MCNC: 2.2 MG/DL (ref 1.8–2.4)
POTASSIUM SERPL-SCNC: 3.4 MMOL/L (ref 3.5–5.1)
SERVICE CMNT-IMP: ABNORMAL
SODIUM SERPL-SCNC: 142 MMOL/L (ref 136–145)
VANCOMYCIN SERPL-MCNC: 14.6 UG/ML

## 2025-06-15 PROCEDURE — 6370000000 HC RX 637 (ALT 250 FOR IP): Performed by: PHYSICIAN ASSISTANT

## 2025-06-15 PROCEDURE — 94640 AIRWAY INHALATION TREATMENT: CPT

## 2025-06-15 PROCEDURE — 2580000003 HC RX 258: Performed by: SURGERY

## 2025-06-15 PROCEDURE — 93010 ELECTROCARDIOGRAM REPORT: CPT | Performed by: INTERNAL MEDICINE

## 2025-06-15 PROCEDURE — 83605 ASSAY OF LACTIC ACID: CPT

## 2025-06-15 PROCEDURE — 6370000000 HC RX 637 (ALT 250 FOR IP): Performed by: SURGERY

## 2025-06-15 PROCEDURE — 93308 TTE F-UP OR LMTD: CPT | Performed by: INTERNAL MEDICINE

## 2025-06-15 PROCEDURE — 80202 ASSAY OF VANCOMYCIN: CPT

## 2025-06-15 PROCEDURE — 2700000000 HC OXYGEN THERAPY PER DAY

## 2025-06-15 PROCEDURE — 87070 CULTURE OTHR SPECIMN AEROBIC: CPT

## 2025-06-15 PROCEDURE — 93325 DOPPLER ECHO COLOR FLOW MAPG: CPT | Performed by: INTERNAL MEDICINE

## 2025-06-15 PROCEDURE — 94760 N-INVAS EAR/PLS OXIMETRY 1: CPT

## 2025-06-15 PROCEDURE — 93308 TTE F-UP OR LMTD: CPT

## 2025-06-15 PROCEDURE — 97530 THERAPEUTIC ACTIVITIES: CPT

## 2025-06-15 PROCEDURE — 6360000002 HC RX W HCPCS: Performed by: INTERNAL MEDICINE

## 2025-06-15 PROCEDURE — 87186 SC STD MICRODIL/AGAR DIL: CPT

## 2025-06-15 PROCEDURE — 2580000003 HC RX 258: Performed by: INTERNAL MEDICINE

## 2025-06-15 PROCEDURE — 82962 GLUCOSE BLOOD TEST: CPT

## 2025-06-15 PROCEDURE — 87077 CULTURE AEROBIC IDENTIFY: CPT

## 2025-06-15 PROCEDURE — 94761 N-INVAS EAR/PLS OXIMETRY MLT: CPT

## 2025-06-15 PROCEDURE — 2500000003 HC RX 250 WO HCPCS: Performed by: PHYSICIAN ASSISTANT

## 2025-06-15 PROCEDURE — 36415 COLL VENOUS BLD VENIPUNCTURE: CPT

## 2025-06-15 PROCEDURE — 6360000002 HC RX W HCPCS: Performed by: SURGERY

## 2025-06-15 PROCEDURE — 83735 ASSAY OF MAGNESIUM: CPT

## 2025-06-15 PROCEDURE — 93321 DOPPLER ECHO F-UP/LMTD STD: CPT | Performed by: INTERNAL MEDICINE

## 2025-06-15 PROCEDURE — 6370000000 HC RX 637 (ALT 250 FOR IP): Performed by: NURSE PRACTITIONER

## 2025-06-15 PROCEDURE — 92526 ORAL FUNCTION THERAPY: CPT

## 2025-06-15 PROCEDURE — 87205 SMEAR GRAM STAIN: CPT

## 2025-06-15 PROCEDURE — 2140000001 HC CVICU INTERMEDIATE R&B

## 2025-06-15 PROCEDURE — 6360000002 HC RX W HCPCS: Performed by: PHYSICIAN ASSISTANT

## 2025-06-15 PROCEDURE — 99233 SBSQ HOSP IP/OBS HIGH 50: CPT | Performed by: INTERNAL MEDICINE

## 2025-06-15 PROCEDURE — 80048 BASIC METABOLIC PNL TOTAL CA: CPT

## 2025-06-15 RX ORDER — FAMOTIDINE 20 MG/1
20 TABLET, FILM COATED ORAL DAILY
Status: DISCONTINUED | OUTPATIENT
Start: 2025-06-15 | End: 2025-06-17 | Stop reason: HOSPADM

## 2025-06-15 RX ORDER — ONDANSETRON 2 MG/ML
4 INJECTION INTRAMUSCULAR; INTRAVENOUS EVERY 6 HOURS PRN
Status: DISCONTINUED | OUTPATIENT
Start: 2025-06-15 | End: 2025-06-17 | Stop reason: HOSPADM

## 2025-06-15 RX ORDER — INSULIN LISPRO 100 [IU]/ML
0-6 INJECTION, SOLUTION INTRAVENOUS; SUBCUTANEOUS NIGHTLY
Status: DISCONTINUED | OUTPATIENT
Start: 2025-06-15 | End: 2025-06-17 | Stop reason: HOSPADM

## 2025-06-15 RX ORDER — FAMOTIDINE 20 MG/1
20 TABLET, FILM COATED ORAL 2 TIMES DAILY
Status: DISCONTINUED | OUTPATIENT
Start: 2025-06-15 | End: 2025-06-15

## 2025-06-15 RX ORDER — SODIUM CHLORIDE 0.9 % (FLUSH) 0.9 %
5-40 SYRINGE (ML) INJECTION EVERY 12 HOURS SCHEDULED
Status: DISCONTINUED | OUTPATIENT
Start: 2025-06-15 | End: 2025-06-15

## 2025-06-15 RX ORDER — INSULIN LISPRO 100 [IU]/ML
0-12 INJECTION, SOLUTION INTRAVENOUS; SUBCUTANEOUS
Status: DISCONTINUED | OUTPATIENT
Start: 2025-06-15 | End: 2025-06-17 | Stop reason: HOSPADM

## 2025-06-15 RX ORDER — POLYETHYLENE GLYCOL 3350 17 G/17G
17 POWDER, FOR SOLUTION ORAL DAILY PRN
Status: DISCONTINUED | OUTPATIENT
Start: 2025-06-15 | End: 2025-06-15 | Stop reason: SDUPTHER

## 2025-06-15 RX ORDER — POLYETHYLENE GLYCOL 3350 17 G/17G
17 POWDER, FOR SOLUTION ORAL DAILY PRN
Status: DISCONTINUED | OUTPATIENT
Start: 2025-06-15 | End: 2025-06-17 | Stop reason: HOSPADM

## 2025-06-15 RX ORDER — SENNA AND DOCUSATE SODIUM 50; 8.6 MG/1; MG/1
1 TABLET, FILM COATED ORAL 2 TIMES DAILY
Status: DISCONTINUED | OUTPATIENT
Start: 2025-06-15 | End: 2025-06-17 | Stop reason: HOSPADM

## 2025-06-15 RX ORDER — SODIUM CHLORIDE FOR INHALATION 3 %
4 VIAL, NEBULIZER (ML) INHALATION 2 TIMES DAILY
Status: DISCONTINUED | OUTPATIENT
Start: 2025-06-15 | End: 2025-06-17 | Stop reason: HOSPADM

## 2025-06-15 RX ORDER — ONDANSETRON 4 MG/1
4 TABLET, ORALLY DISINTEGRATING ORAL EVERY 8 HOURS PRN
Status: DISCONTINUED | OUTPATIENT
Start: 2025-06-15 | End: 2025-06-17 | Stop reason: HOSPADM

## 2025-06-15 RX ORDER — SODIUM CHLORIDE 0.9 % (FLUSH) 0.9 %
5-40 SYRINGE (ML) INJECTION PRN
Status: DISCONTINUED | OUTPATIENT
Start: 2025-06-15 | End: 2025-06-17 | Stop reason: HOSPADM

## 2025-06-15 RX ORDER — DEXTROSE MONOHYDRATE 100 MG/ML
INJECTION, SOLUTION INTRAVENOUS CONTINUOUS PRN
Status: DISCONTINUED | OUTPATIENT
Start: 2025-06-15 | End: 2025-06-17 | Stop reason: HOSPADM

## 2025-06-15 RX ADMIN — IPRATROPIUM BROMIDE 0.5 MG: 0.5 SOLUTION RESPIRATORY (INHALATION) at 14:56

## 2025-06-15 RX ADMIN — INSULIN LISPRO 1 UNITS: 100 INJECTION, SOLUTION INTRAVENOUS; SUBCUTANEOUS at 20:22

## 2025-06-15 RX ADMIN — AMIODARONE HYDROCHLORIDE 200 MG: 200 TABLET ORAL at 10:02

## 2025-06-15 RX ADMIN — Medication 1 AMPULE: at 10:02

## 2025-06-15 RX ADMIN — GABAPENTIN 300 MG: 100 CAPSULE ORAL at 10:03

## 2025-06-15 RX ADMIN — GUAIFENESIN 1200 MG: 600 TABLET ORAL at 20:19

## 2025-06-15 RX ADMIN — DOCUSATE SODIUM 50 MG AND SENNOSIDES 8.6 MG 1 TABLET: 8.6; 5 TABLET, FILM COATED ORAL at 20:19

## 2025-06-15 RX ADMIN — POTASSIUM CHLORIDE 40 MEQ: 1500 TABLET, EXTENDED RELEASE ORAL at 10:03

## 2025-06-15 RX ADMIN — ARFORMOTEROL TARTRATE 15 MCG: 15 SOLUTION RESPIRATORY (INHALATION) at 08:14

## 2025-06-15 RX ADMIN — BUDESONIDE INHALATION 500 MCG: 0.5 SUSPENSION RESPIRATORY (INHALATION) at 08:14

## 2025-06-15 RX ADMIN — AMIODARONE HYDROCHLORIDE 200 MG: 200 TABLET ORAL at 20:19

## 2025-06-15 RX ADMIN — POLYETHYLENE GLYCOL 3350 17 G: 17 POWDER, FOR SOLUTION ORAL at 10:03

## 2025-06-15 RX ADMIN — INSULIN LISPRO 2 UNITS: 100 INJECTION, SOLUTION INTRAVENOUS; SUBCUTANEOUS at 12:04

## 2025-06-15 RX ADMIN — PIPERACILLIN AND TAZOBACTAM 3375 MG: 3; .375 INJECTION, POWDER, LYOPHILIZED, FOR SOLUTION INTRAVENOUS at 20:30

## 2025-06-15 RX ADMIN — TAMSULOSIN HYDROCHLORIDE 0.4 MG: 0.4 CAPSULE ORAL at 10:02

## 2025-06-15 RX ADMIN — PIPERACILLIN AND TAZOBACTAM 3375 MG: 3; .375 INJECTION, POWDER, LYOPHILIZED, FOR SOLUTION INTRAVENOUS at 12:30

## 2025-06-15 RX ADMIN — ACETAMINOPHEN 1000 MG: 500 TABLET, FILM COATED ORAL at 12:05

## 2025-06-15 RX ADMIN — FAMOTIDINE 20 MG: 20 TABLET, FILM COATED ORAL at 10:01

## 2025-06-15 RX ADMIN — ACETAMINOPHEN 1000 MG: 500 TABLET, FILM COATED ORAL at 17:54

## 2025-06-15 RX ADMIN — VANCOMYCIN HYDROCHLORIDE 1000 MG: 1 INJECTION, POWDER, LYOPHILIZED, FOR SOLUTION INTRAVENOUS at 12:16

## 2025-06-15 RX ADMIN — ARFORMOTEROL TARTRATE: 15 SOLUTION RESPIRATORY (INHALATION) at 19:30

## 2025-06-15 RX ADMIN — GABAPENTIN 300 MG: 100 CAPSULE ORAL at 20:19

## 2025-06-15 RX ADMIN — Medication 4 ML: at 19:30

## 2025-06-15 RX ADMIN — IPRATROPIUM BROMIDE 0.5 MG: 0.5 SOLUTION RESPIRATORY (INHALATION) at 19:30

## 2025-06-15 RX ADMIN — Medication 6 MG: at 20:19

## 2025-06-15 RX ADMIN — IPRATROPIUM BROMIDE 0.5 MG: 0.5 SOLUTION RESPIRATORY (INHALATION) at 08:14

## 2025-06-15 RX ADMIN — Medication 1 AMPULE: at 20:35

## 2025-06-15 RX ADMIN — POTASSIUM CHLORIDE 40 MEQ: 1500 TABLET, EXTENDED RELEASE ORAL at 17:54

## 2025-06-15 RX ADMIN — Medication 4 ML: at 08:14

## 2025-06-15 RX ADMIN — GUAIFENESIN 1200 MG: 600 TABLET ORAL at 10:02

## 2025-06-15 RX ADMIN — PIPERACILLIN AND TAZOBACTAM 3375 MG: 3; .375 INJECTION, POWDER, LYOPHILIZED, FOR SOLUTION INTRAVENOUS at 04:19

## 2025-06-15 RX ADMIN — ATORVASTATIN CALCIUM 80 MG: 80 TABLET, FILM COATED ORAL at 20:19

## 2025-06-15 RX ADMIN — ASPIRIN 81 MG: 81 TABLET ORAL at 10:02

## 2025-06-15 RX ADMIN — SODIUM CHLORIDE, PRESERVATIVE FREE 10 ML: 5 INJECTION INTRAVENOUS at 10:03

## 2025-06-15 ASSESSMENT — PAIN SCALES - GENERAL
PAINLEVEL_OUTOF10: 0

## 2025-06-16 ENCOUNTER — APPOINTMENT (OUTPATIENT)
Dept: GENERAL RADIOLOGY | Age: 80
DRG: 235 | End: 2025-06-16
Attending: THORACIC SURGERY (CARDIOTHORACIC VASCULAR SURGERY)
Payer: MEDICARE

## 2025-06-16 PROBLEM — I48.91 ATRIAL FIBRILLATION WITH RVR (HCC): Status: ACTIVE | Noted: 2025-06-16

## 2025-06-16 PROBLEM — Z01.818 PRE-OP TESTING: Status: RESOLVED | Noted: 2025-06-11 | Resolved: 2025-06-16

## 2025-06-16 PROBLEM — J98.6 ELEVATED HEMIDIAPHRAGM: Status: ACTIVE | Noted: 2025-06-16

## 2025-06-16 LAB
ANION GAP SERPL CALC-SCNC: 11 MMOL/L (ref 7–16)
BUN SERPL-MCNC: 46 MG/DL (ref 8–23)
CALCIUM SERPL-MCNC: 8.7 MG/DL (ref 8.8–10.2)
CHLORIDE SERPL-SCNC: 107 MMOL/L (ref 98–107)
CO2 SERPL-SCNC: 22 MMOL/L (ref 20–29)
CREAT SERPL-MCNC: 1.8 MG/DL (ref 0.8–1.3)
ERYTHROCYTE [DISTWIDTH] IN BLOOD BY AUTOMATED COUNT: 14.2 % (ref 11.9–14.6)
GLUCOSE BLD STRIP.AUTO-MCNC: 147 MG/DL (ref 65–100)
GLUCOSE BLD STRIP.AUTO-MCNC: 195 MG/DL (ref 65–100)
GLUCOSE BLD STRIP.AUTO-MCNC: 197 MG/DL (ref 65–100)
GLUCOSE BLD STRIP.AUTO-MCNC: 232 MG/DL (ref 65–100)
GLUCOSE SERPL-MCNC: 135 MG/DL (ref 70–99)
HCT VFR BLD AUTO: 36.3 % (ref 41.1–50.3)
HGB BLD-MCNC: 12 G/DL (ref 13.6–17.2)
MAGNESIUM SERPL-MCNC: 2.2 MG/DL (ref 1.8–2.4)
MCH RBC QN AUTO: 32.5 PG (ref 26.1–32.9)
MCHC RBC AUTO-ENTMCNC: 33.1 G/DL (ref 31.4–35)
MCV RBC AUTO: 98.4 FL (ref 82–102)
NRBC # BLD: 0 K/UL (ref 0–0.2)
PLATELET # BLD AUTO: 175 K/UL (ref 150–450)
PMV BLD AUTO: 10.9 FL (ref 9.4–12.3)
POTASSIUM SERPL-SCNC: 4.2 MMOL/L (ref 3.5–5.1)
RBC # BLD AUTO: 3.69 M/UL (ref 4.23–5.6)
SERVICE CMNT-IMP: ABNORMAL
SODIUM SERPL-SCNC: 140 MMOL/L (ref 136–145)
WBC # BLD AUTO: 11 K/UL (ref 4.3–11.1)

## 2025-06-16 PROCEDURE — 85027 COMPLETE CBC AUTOMATED: CPT

## 2025-06-16 PROCEDURE — 6360000002 HC RX W HCPCS: Performed by: NURSE PRACTITIONER

## 2025-06-16 PROCEDURE — 94760 N-INVAS EAR/PLS OXIMETRY 1: CPT

## 2025-06-16 PROCEDURE — 2580000003 HC RX 258: Performed by: SURGERY

## 2025-06-16 PROCEDURE — 76937 US GUIDE VASCULAR ACCESS: CPT

## 2025-06-16 PROCEDURE — 6360000002 HC RX W HCPCS: Performed by: INTERNAL MEDICINE

## 2025-06-16 PROCEDURE — 6360000002 HC RX W HCPCS: Performed by: SURGERY

## 2025-06-16 PROCEDURE — 6360000002 HC RX W HCPCS: Performed by: PHYSICIAN ASSISTANT

## 2025-06-16 PROCEDURE — 2580000003 HC RX 258: Performed by: NURSE PRACTITIONER

## 2025-06-16 PROCEDURE — 94640 AIRWAY INHALATION TREATMENT: CPT

## 2025-06-16 PROCEDURE — 6370000000 HC RX 637 (ALT 250 FOR IP): Performed by: NURSE PRACTITIONER

## 2025-06-16 PROCEDURE — 2700000000 HC OXYGEN THERAPY PER DAY

## 2025-06-16 PROCEDURE — 36415 COLL VENOUS BLD VENIPUNCTURE: CPT

## 2025-06-16 PROCEDURE — 83735 ASSAY OF MAGNESIUM: CPT

## 2025-06-16 PROCEDURE — 99024 POSTOP FOLLOW-UP VISIT: CPT | Performed by: PHYSICIAN ASSISTANT

## 2025-06-16 PROCEDURE — 6370000000 HC RX 637 (ALT 250 FOR IP): Performed by: SURGERY

## 2025-06-16 PROCEDURE — 71046 X-RAY EXAM CHEST 2 VIEWS: CPT

## 2025-06-16 PROCEDURE — 97530 THERAPEUTIC ACTIVITIES: CPT

## 2025-06-16 PROCEDURE — 2580000003 HC RX 258: Performed by: INTERNAL MEDICINE

## 2025-06-16 PROCEDURE — 82962 GLUCOSE BLOOD TEST: CPT

## 2025-06-16 PROCEDURE — 6370000000 HC RX 637 (ALT 250 FOR IP): Performed by: INTERNAL MEDICINE

## 2025-06-16 PROCEDURE — 80048 BASIC METABOLIC PNL TOTAL CA: CPT

## 2025-06-16 PROCEDURE — 92526 ORAL FUNCTION THERAPY: CPT

## 2025-06-16 PROCEDURE — 99232 SBSQ HOSP IP/OBS MODERATE 35: CPT | Performed by: INTERNAL MEDICINE

## 2025-06-16 PROCEDURE — 6370000000 HC RX 637 (ALT 250 FOR IP): Performed by: PHYSICIAN ASSISTANT

## 2025-06-16 PROCEDURE — 97110 THERAPEUTIC EXERCISES: CPT

## 2025-06-16 PROCEDURE — 2140000001 HC CVICU INTERMEDIATE R&B

## 2025-06-16 RX ORDER — AMIODARONE HYDROCHLORIDE 200 MG/1
400 TABLET ORAL 2 TIMES DAILY
Status: DISCONTINUED | OUTPATIENT
Start: 2025-06-16 | End: 2025-06-17 | Stop reason: HOSPADM

## 2025-06-16 RX ADMIN — ARFORMOTEROL TARTRATE: 15 SOLUTION RESPIRATORY (INHALATION) at 07:17

## 2025-06-16 RX ADMIN — INSULIN LISPRO 2 UNITS: 100 INJECTION, SOLUTION INTRAVENOUS; SUBCUTANEOUS at 12:37

## 2025-06-16 RX ADMIN — ATORVASTATIN CALCIUM 80 MG: 80 TABLET, FILM COATED ORAL at 19:41

## 2025-06-16 RX ADMIN — INSULIN LISPRO 2 UNITS: 100 INJECTION, SOLUTION INTRAVENOUS; SUBCUTANEOUS at 09:29

## 2025-06-16 RX ADMIN — ACETAMINOPHEN 1000 MG: 500 TABLET, FILM COATED ORAL at 00:14

## 2025-06-16 RX ADMIN — INSULIN LISPRO 2 UNITS: 100 INJECTION, SOLUTION INTRAVENOUS; SUBCUTANEOUS at 16:44

## 2025-06-16 RX ADMIN — CEFTRIAXONE SODIUM 2000 MG: 2 INJECTION, POWDER, FOR SOLUTION INTRAMUSCULAR; INTRAVENOUS at 14:21

## 2025-06-16 RX ADMIN — GABAPENTIN 300 MG: 100 CAPSULE ORAL at 19:41

## 2025-06-16 RX ADMIN — Medication 1 AMPULE: at 19:41

## 2025-06-16 RX ADMIN — FAMOTIDINE 20 MG: 20 TABLET, FILM COATED ORAL at 09:28

## 2025-06-16 RX ADMIN — METOPROLOL TARTRATE 12.5 MG: 25 TABLET, FILM COATED ORAL at 19:42

## 2025-06-16 RX ADMIN — Medication 4 ML: at 19:55

## 2025-06-16 RX ADMIN — AMIODARONE HYDROCHLORIDE 400 MG: 200 TABLET ORAL at 19:43

## 2025-06-16 RX ADMIN — GABAPENTIN 300 MG: 100 CAPSULE ORAL at 14:14

## 2025-06-16 RX ADMIN — DOCUSATE SODIUM 50 MG AND SENNOSIDES 8.6 MG 1 TABLET: 8.6; 5 TABLET, FILM COATED ORAL at 09:28

## 2025-06-16 RX ADMIN — FUROSEMIDE 40 MG: 40 TABLET ORAL at 12:37

## 2025-06-16 RX ADMIN — PIPERACILLIN AND TAZOBACTAM 3375 MG: 3; .375 INJECTION, POWDER, LYOPHILIZED, FOR SOLUTION INTRAVENOUS at 03:16

## 2025-06-16 RX ADMIN — ACETAMINOPHEN 1000 MG: 500 TABLET, FILM COATED ORAL at 12:37

## 2025-06-16 RX ADMIN — IPRATROPIUM BROMIDE 0.5 MG: 0.5 SOLUTION RESPIRATORY (INHALATION) at 13:13

## 2025-06-16 RX ADMIN — GUAIFENESIN 1200 MG: 600 TABLET ORAL at 09:29

## 2025-06-16 RX ADMIN — ARFORMOTEROL TARTRATE: 15 SOLUTION RESPIRATORY (INHALATION) at 19:55

## 2025-06-16 RX ADMIN — METOPROLOL TARTRATE 12.5 MG: 25 TABLET, FILM COATED ORAL at 09:28

## 2025-06-16 RX ADMIN — GUAIFENESIN 1200 MG: 600 TABLET ORAL at 19:41

## 2025-06-16 RX ADMIN — INSULIN LISPRO 2 UNITS: 100 INJECTION, SOLUTION INTRAVENOUS; SUBCUTANEOUS at 19:43

## 2025-06-16 RX ADMIN — AMIODARONE HYDROCHLORIDE 400 MG: 200 TABLET ORAL at 09:27

## 2025-06-16 RX ADMIN — TAMSULOSIN HYDROCHLORIDE 0.4 MG: 0.4 CAPSULE ORAL at 19:42

## 2025-06-16 RX ADMIN — ACETAMINOPHEN 1000 MG: 500 TABLET, FILM COATED ORAL at 06:33

## 2025-06-16 RX ADMIN — GABAPENTIN 300 MG: 100 CAPSULE ORAL at 09:28

## 2025-06-16 RX ADMIN — Medication 4 ML: at 07:17

## 2025-06-16 RX ADMIN — IPRATROPIUM BROMIDE 0.5 MG: 0.5 SOLUTION RESPIRATORY (INHALATION) at 07:18

## 2025-06-16 RX ADMIN — Medication 1 AMPULE: at 09:31

## 2025-06-16 RX ADMIN — ASPIRIN 81 MG: 81 TABLET ORAL at 09:28

## 2025-06-16 RX ADMIN — IPRATROPIUM BROMIDE 0.5 MG: 0.5 SOLUTION RESPIRATORY (INHALATION) at 19:55

## 2025-06-16 RX ADMIN — ACETAMINOPHEN 1000 MG: 500 TABLET, FILM COATED ORAL at 18:16

## 2025-06-16 ASSESSMENT — PAIN SCALES - GENERAL
PAINLEVEL_OUTOF10: 4
PAINLEVEL_OUTOF10: 0
PAINLEVEL_OUTOF10: 0
PAINLEVEL_OUTOF10: 4

## 2025-06-16 ASSESSMENT — PAIN DESCRIPTION - DESCRIPTORS
DESCRIPTORS: ACHING
DESCRIPTORS: ACHING

## 2025-06-16 ASSESSMENT — PAIN DESCRIPTION - ORIENTATION
ORIENTATION: ANTERIOR
ORIENTATION: POSTERIOR

## 2025-06-16 ASSESSMENT — PAIN DESCRIPTION - LOCATION
LOCATION: CHEST
LOCATION: CHEST

## 2025-06-16 NOTE — ICUWATCH
RRT Clinical Rounding Nurse Progress Report      SUBJECTIVE: Patient assessed secondary to transfer from critical care.      Vitals:    06/15/25 1930 06/16/25 0010 06/16/25 0300 06/16/25 0615   BP: 110/79 99/69 125/70    Pulse: 99 (!) 117 89    Resp: 18 18 18    Temp: 98.3 °F (36.8 °C) 98 °F (36.7 °C)     TempSrc: Oral Temporal     SpO2: 92% 94% 94%    Weight:    94.4 kg (208 lb 1.8 oz)   Height:            DETERIORATION INDEX SCORE: 51    ASSESSMENT:  Upon arrival to room, pt in bed resting quietly. Wakes to voice, A&Ox4, no needs expressed at this time. VSS. Respirations even and unlabored, expressing dyspnea with exertion, bilaterally diminished. Sternal incision C/D/I.    PLAN:  Will follow per RRT Clinical Rounding Program protocol.    Julia Mendoza RN  [facility]: [phone]  [facility]: [phone]

## 2025-06-16 NOTE — CARE COORDINATION
CM spoke to patient at bedside to inform him PT is recommending home health care services. Patient stated that he is in agreement. CM gave patient home health care choice list. Patient chose [facility].     CM sent referral accordingly for PT and RN services.

## 2025-06-16 NOTE — ICUWATCH
RRT Clinical Rounding Nurse Update    Vitals:    06/16/25 0715 06/16/25 0716 06/16/25 0717 06/16/25 0718   BP:       Pulse: (!) 103 80 (!) 109 79   Resp: 18      Temp:       TempSrc:       SpO2: 94%      Weight:       Height:            ASSESSMENT:  Previous outreach assessment was reviewed. There have been no significant changes since previous assessment.  Pt sitting up in recliner. ST w/freq PACS/intermittent Afib, HR 110s on monitor. BP stable.  Meds adjusted by provider this am.  No concerns per Primary RN.     PLAN:  Will discharge from [facility] Clinical Rounding Program per protocol. Please call if needed.    Estefania Luke RN  [facility]: [phone]  [facility]: [phone]

## 2025-06-17 VITALS
TEMPERATURE: 98.3 F | BODY MASS INDEX: 32.23 KG/M2 | DIASTOLIC BLOOD PRESSURE: 70 MMHG | WEIGHT: 217.59 LBS | SYSTOLIC BLOOD PRESSURE: 117 MMHG | HEART RATE: 89 BPM | OXYGEN SATURATION: 94 % | HEIGHT: 69 IN | RESPIRATION RATE: 18 BRPM

## 2025-06-17 LAB
GLUCOSE BLD STRIP.AUTO-MCNC: 133 MG/DL (ref 65–100)
MAGNESIUM SERPL-MCNC: 2 MG/DL (ref 1.8–2.4)
POTASSIUM SERPL-SCNC: 4.7 MMOL/L (ref 3.5–5.1)
PROCALCITONIN SERPL-MCNC: 0.12 NG/ML (ref 0–0.1)
SERVICE CMNT-IMP: ABNORMAL

## 2025-06-17 PROCEDURE — 99024 POSTOP FOLLOW-UP VISIT: CPT | Performed by: PHYSICIAN ASSISTANT

## 2025-06-17 PROCEDURE — 83735 ASSAY OF MAGNESIUM: CPT

## 2025-06-17 PROCEDURE — 82962 GLUCOSE BLOOD TEST: CPT

## 2025-06-17 PROCEDURE — 84132 ASSAY OF SERUM POTASSIUM: CPT

## 2025-06-17 PROCEDURE — 6370000000 HC RX 637 (ALT 250 FOR IP): Performed by: INTERNAL MEDICINE

## 2025-06-17 PROCEDURE — 36415 COLL VENOUS BLD VENIPUNCTURE: CPT

## 2025-06-17 PROCEDURE — 84145 PROCALCITONIN (PCT): CPT

## 2025-06-17 PROCEDURE — 6370000000 HC RX 637 (ALT 250 FOR IP): Performed by: SURGERY

## 2025-06-17 PROCEDURE — 6360000002 HC RX W HCPCS: Performed by: PHYSICIAN ASSISTANT

## 2025-06-17 PROCEDURE — 6360000002 HC RX W HCPCS: Performed by: INTERNAL MEDICINE

## 2025-06-17 PROCEDURE — 99232 SBSQ HOSP IP/OBS MODERATE 35: CPT | Performed by: INTERNAL MEDICINE

## 2025-06-17 PROCEDURE — 94640 AIRWAY INHALATION TREATMENT: CPT

## 2025-06-17 PROCEDURE — 6370000000 HC RX 637 (ALT 250 FOR IP): Performed by: NURSE PRACTITIONER

## 2025-06-17 PROCEDURE — 76937 US GUIDE VASCULAR ACCESS: CPT

## 2025-06-17 PROCEDURE — 2580000003 HC RX 258: Performed by: INTERNAL MEDICINE

## 2025-06-17 PROCEDURE — 6370000000 HC RX 637 (ALT 250 FOR IP): Performed by: PHYSICIAN ASSISTANT

## 2025-06-17 PROCEDURE — 94760 N-INVAS EAR/PLS OXIMETRY 1: CPT

## 2025-06-17 RX ORDER — AMIODARONE HYDROCHLORIDE 200 MG/1
TABLET ORAL
Qty: 35 TABLET | Refills: 0 | Status: SHIPPED | OUTPATIENT
Start: 2025-06-17 | End: 2025-07-08

## 2025-06-17 RX ORDER — ASPIRIN 81 MG/1
81 TABLET ORAL DAILY
COMMUNITY
Start: 2025-06-18

## 2025-06-17 RX ORDER — ACETAMINOPHEN 500 MG
1000 TABLET ORAL EVERY 6 HOURS PRN
COMMUNITY
Start: 2025-06-17

## 2025-06-17 RX ADMIN — Medication 6 MG: at 02:09

## 2025-06-17 RX ADMIN — IPRATROPIUM BROMIDE 0.5 MG: 0.5 SOLUTION RESPIRATORY (INHALATION) at 07:43

## 2025-06-17 RX ADMIN — FUROSEMIDE 40 MG: 40 TABLET ORAL at 08:29

## 2025-06-17 RX ADMIN — ACETAMINOPHEN 1000 MG: 500 TABLET, FILM COATED ORAL at 06:35

## 2025-06-17 RX ADMIN — GUAIFENESIN 1200 MG: 600 TABLET ORAL at 08:30

## 2025-06-17 RX ADMIN — GABAPENTIN 300 MG: 100 CAPSULE ORAL at 08:29

## 2025-06-17 RX ADMIN — ASPIRIN 81 MG: 81 TABLET ORAL at 08:28

## 2025-06-17 RX ADMIN — DOCUSATE SODIUM 50 MG AND SENNOSIDES 8.6 MG 1 TABLET: 8.6; 5 TABLET, FILM COATED ORAL at 08:28

## 2025-06-17 RX ADMIN — METOPROLOL TARTRATE 12.5 MG: 25 TABLET, FILM COATED ORAL at 08:27

## 2025-06-17 RX ADMIN — Medication 1 AMPULE: at 08:31

## 2025-06-17 RX ADMIN — ACETAMINOPHEN 1000 MG: 500 TABLET, FILM COATED ORAL at 00:15

## 2025-06-17 RX ADMIN — FAMOTIDINE 20 MG: 20 TABLET, FILM COATED ORAL at 08:28

## 2025-06-17 RX ADMIN — AMIODARONE HYDROCHLORIDE 400 MG: 200 TABLET ORAL at 08:29

## 2025-06-17 RX ADMIN — ARFORMOTEROL TARTRATE: 15 SOLUTION RESPIRATORY (INHALATION) at 07:43

## 2025-06-17 RX ADMIN — Medication 4 ML: at 07:43

## 2025-06-17 NOTE — PLAN OF CARE
Problem: Confusion  Goal: Confusion, delirium, dementia, or psychosis is improved or at baseline  Description: INTERVENTIONS:1. Assess for possible contributors to thought disturbance, including medications, impaired vision or hearing, underlying metabolic abnormalities, dehydration, psychiatric diagnoses, and notify attending LIP2. [place] high risk fall precautions, as indicated3. Provide frequent short contacts to provide reality reorientation, refocusing and direction4. Decrease environmental stimuli, including noise as appropriate5. Monitor and intervene to maintain adequate nutrition, hydration, elimination, sleep and activity6. If unable to ensure safety without constant attention obtain sitter and review sitter guidelines with assigned personnel7. Initiate Psychosocial CNS and Spiritual Care consult, as indicated  INTERVENTIONS:1. Assess for possible contributors to thought disturbance, including medications, impaired vision or hearing, underlying metabolic abnormalities, dehydration, psychiatric diagnoses, and notify attending LIP2. [place] high risk fall precautions, as indicated3. Provide frequent short contacts to provide reality reorientation, refocusing and direction4. Decrease environmental stimuli, including noise as appropriate5. Monitor and intervene to maintain adequate nutrition, hydration, elimination, sleep and activity6. If unable to ensure safety without constant attention obtain sitter and review sitter guidelines with assigned personnel7. Initiate Psychosocial CNS and Spiritual Care consult, as indicated  INTERVENTIONS:1. Assess for possible contributors to thought disturbance, including medications, impaired vision or hearing, underlying metabolic abnormalities, dehydration, psychiatric diagnoses, and notify attending LIP2. [place] high risk fall precautions, as indicated3. Provide frequent short contacts to provide reality reorientation, refocusing and direction4. Decrease 
  Problem: Confusion  Goal: Confusion, delirium, dementia, or psychosis is improved or at baseline  Description: INTERVENTIONS:1. Assess for possible contributors to thought disturbance, including medications, impaired vision or hearing, underlying metabolic abnormalities, dehydration, psychiatric diagnoses, and notify attending LIP2. [place] high risk fall precautions, as indicated3. Provide frequent short contacts to provide reality reorientation, refocusing and direction4. Decrease environmental stimuli, including noise as appropriate5. Monitor and intervene to maintain adequate nutrition, hydration, elimination, sleep and activity6. If unable to ensure safety without constant attention obtain sitter and review sitter guidelines with assigned personnel7. Initiate Psychosocial CNS and Spiritual Care consult, as indicated  Outcome: Not Progressing     
  Problem: Pain  Goal: Verbalizes/displays adequate comfort level or baseline comfort level  6/12/2025 2152 by Juan Manuel Abdi RN  Outcome: Progressing  Flowsheets (Taken 6/12/2025 1901)  Verbalizes/displays adequate comfort level or baseline comfort level:   Encourage patient to monitor pain and request assistance   Assess pain using appropriate pain scale   Administer analgesics based on type and severity of pain and evaluate response   Implement non-pharmacological measures as appropriate and evaluate response   Consider cultural and social influences on pain and pain management   Notify Licensed Independent Practitioner if interventions unsuccessful or patient reports new pain  6/12/2025 0923 by Birdie Travis RN  Outcome: Progressing     Problem: Safety - Adult  Goal: Free from fall injury  6/12/2025 2152 by Juan Manuel Abdi RN  Outcome: Progressing  6/12/2025 0923 by Birdie Travis RN  Outcome: Progressing  Flowsheets (Taken 6/12/2025 0715)  Free From Fall Injury: Instruct family/caregiver on patient safety     Problem: Chronic Conditions and Co-morbidities  Goal: Patient's chronic conditions and co-morbidity symptoms are monitored and maintained or improved  6/12/2025 2152 by Juan Manuel Abdi RN  Outcome: Progressing  Flowsheets (Taken 6/12/2025 1901)  Care Plan - Patient's Chronic Conditions and Co-Morbidity Symptoms are Monitored and Maintained or Improved:   Monitor and assess patient's chronic conditions and comorbid symptoms for stability, deterioration, or improvement   Collaborate with multidisciplinary team to address chronic and comorbid conditions and prevent exacerbation or deterioration   Update acute care plan with appropriate goals if chronic or comorbid symptoms are exacerbated and prevent overall improvement and discharge  6/12/2025 0923 by Birdie Travis RN  Outcome: Progressing  Flowsheets (Taken 6/12/2025 0715)  Care Plan - Patient's Chronic Conditions and Co-Morbidity Symptoms are 
  Problem: Pain  Goal: Verbalizes/displays adequate comfort level or baseline comfort level  6/13/2025 1809 by Marianela Bond RN  Outcome: Progressing  6/13/2025 0948 by Catherine Nava RN  Outcome: Progressing  Flowsheets (Taken 6/13/2025 0730)  Verbalizes/displays adequate comfort level or baseline comfort level:   Encourage patient to monitor pain and request assistance   Assess pain using appropriate pain scale     Problem: Safety - Adult  Goal: Free from fall injury  6/13/2025 1809 by Marianela Bond RN  Outcome: Progressing  Flowsheets (Taken 6/13/2025 1645)  Free From Fall Injury:   Instruct family/caregiver on patient safety   Based on caregiver fall risk screen, instruct family/caregiver to ask for assistance with transferring infant if caregiver noted to have fall risk factors  6/13/2025 0948 by Catherine Nava RN  Outcome: Progressing  Flowsheets (Taken 6/13/2025 0730)  Free From Fall Injury: Instruct family/caregiver on patient safety     Problem: Chronic Conditions and Co-morbidities  Goal: Patient's chronic conditions and co-morbidity symptoms are monitored and maintained or improved  6/13/2025 1809 by Marianela Bond RN  Outcome: Progressing  Flowsheets (Taken 6/13/2025 1645)  Care Plan - Patient's Chronic Conditions and Co-Morbidity Symptoms are Monitored and Maintained or Improved:   Monitor and assess patient's chronic conditions and comorbid symptoms for stability, deterioration, or improvement   Collaborate with multidisciplinary team to address chronic and comorbid conditions and prevent exacerbation or deterioration   Update acute care plan with appropriate goals if chronic or comorbid symptoms are exacerbated and prevent overall improvement and discharge  6/13/2025 0948 by Catherine Nava RN  Outcome: Progressing  Flowsheets (Taken 6/13/2025 0730)  Care Plan - Patient's Chronic Conditions and Co-Morbidity Symptoms are Monitored and Maintained or Improved: Monitor and assess 
  Problem: Pain  Goal: Verbalizes/displays adequate comfort level or baseline comfort level  6/13/2025 2047 by Sophia Banegas RN  Outcome: Progressing  Note: Scheduled tylenol and prn tramadol; muscle rub cream added for back soreness   6/13/2025 1809 by Marianela Bond RN  Outcome: Progressing  6/13/2025 0948 by Catherine Nava RN  Outcome: Progressing  Flowsheets (Taken 6/13/2025 0730)  Verbalizes/displays adequate comfort level or baseline comfort level:   Encourage patient to monitor pain and request assistance   Assess pain using appropriate pain scale     Problem: Safety - Adult  Goal: Free from fall injury  6/13/2025 2047 by Sophia Banegas RN  Outcome: Progressing  Flowsheets (Taken 6/13/2025 1956)  Free From Fall Injury: Instruct family/caregiver on patient safety  Note: Bed alarm in use    6/13/2025 1809 by Marianela Bond RN  Outcome: Progressing  Flowsheets (Taken 6/13/2025 1645)  Free From Fall Injury:   Instruct family/caregiver on patient safety   Based on caregiver fall risk screen, instruct family/caregiver to ask for assistance with transferring infant if caregiver noted to have fall risk factors  6/13/2025 0948 by Catherine Nava RN  Outcome: Progressing  Flowsheets (Taken 6/13/2025 0730)  Free From Fall Injury: Instruct family/caregiver on patient safety     Problem: Chronic Conditions and Co-morbidities  Goal: Patient's chronic conditions and co-morbidity symptoms are monitored and maintained or improved  6/13/2025 2047 by Sophia Banegas RN  Outcome: Progressing  Flowsheets (Taken 6/13/2025 1925)  Care Plan - Patient's Chronic Conditions and Co-Morbidity Symptoms are Monitored and Maintained or Improved: Monitor and assess patient's chronic conditions and comorbid symptoms for stability, deterioration, or improvement  6/13/2025 1809 by Marianela Bond RN  Outcome: Progressing  Flowsheets (Taken 6/13/2025 1645)  Care Plan - Patient's Chronic Conditions and Co-Morbidity 
  Problem: Pain  Goal: Verbalizes/displays adequate comfort level or baseline comfort level  6/14/2025 2134 by Lorena Mcknight RN  Outcome: Progressing  6/14/2025 1451 by Marianela Bond RN  Outcome: Progressing     Problem: Safety - Adult  Goal: Free from fall injury  6/14/2025 2135 by Lorena Mcknight RN  Note: Continue using proper tools/equipment to promote safety and increase mobility  6/14/2025 2134 by Lorena Mcknight RN  Outcome: Progressing  6/14/2025 1451 by Marianela Bond RN  Outcome: Progressing  Flowsheets (Taken 6/14/2025 0800)  Free From Fall Injury:   Instruct family/caregiver on patient safety   Based on caregiver fall risk screen, instruct family/caregiver to ask for assistance with transferring infant if caregiver noted to have fall risk factors     Problem: Chronic Conditions and Co-morbidities  Goal: Patient's chronic conditions and co-morbidity symptoms are monitored and maintained or improved  6/14/2025 2134 by Lorena Mcknight RN  Outcome: Progressing  6/14/2025 1451 by Marianela Bond RN  Outcome: Progressing  Flowsheets (Taken 6/14/2025 0800)  Care Plan - Patient's Chronic Conditions and Co-Morbidity Symptoms are Monitored and Maintained or Improved:   Monitor and assess patient's chronic conditions and comorbid symptoms for stability, deterioration, or improvement   Collaborate with multidisciplinary team to address chronic and comorbid conditions and prevent exacerbation or deterioration   Update acute care plan with appropriate goals if chronic or comorbid symptoms are exacerbated and prevent overall improvement and discharge     Problem: Discharge Planning  Goal: Discharge to home or other facility with appropriate resources  6/14/2025 2134 by Lorena Mcknight RN  Outcome: Progressing  6/14/2025 1451 by Marianela Bond RN  Outcome: Progressing  Flowsheets (Taken 6/14/2025 0800)  Discharge to home or other facility with appropriate resources:   Identify barriers to discharge with 
  Problem: Pain  Goal: Verbalizes/displays adequate comfort level or baseline comfort level  Outcome: Progressing     Problem: Safety - Adult  Goal: Free from fall injury  Outcome: Progressing  Flowsheets (Taken 6/14/2025 0800)  Free From Fall Injury:   Instruct family/caregiver on patient safety   Based on caregiver fall risk screen, instruct family/caregiver to ask for assistance with transferring infant if caregiver noted to have fall risk factors     Problem: Chronic Conditions and Co-morbidities  Goal: Patient's chronic conditions and co-morbidity symptoms are monitored and maintained or improved  Outcome: Progressing     Problem: Discharge Planning  Goal: Discharge to home or other facility with appropriate resources  Outcome: Progressing     Problem: Respiratory - Adult  Goal: Achieves optimal ventilation and oxygenation  6/14/2025 1451 by Marianela Bond RN  Outcome: Progressing  6/14/2025 1426 by Chris Heranndez RCP  Outcome: Progressing     Problem: Skin/Tissue Integrity  Goal: Skin integrity remains intact  Description: 1.  Monitor for areas of redness and/or skin breakdown2.  Assess vascular access sites hourly3.  Every 4-6 hours minimum:  Change oxygen saturation probe site4.  Every 4-6 hours:  If on nasal continuous positive airway pressure, respiratory therapy assess nares and determine need for appliance change or resting period  Outcome: Progressing  Flowsheets (Taken 6/14/2025 0800)  Skin Integrity Remains Intact:   Monitor for areas of redness and/or skin breakdown   Assess vascular access sites hourly     Problem: Confusion  Goal: Confusion, delirium, dementia, or psychosis is improved or at baseline  Description: INTERVENTIONS:1. Assess for possible contributors to thought disturbance, including medications, impaired vision or hearing, underlying metabolic abnormalities, dehydration, psychiatric diagnoses, and notify attending LIP2. [place] high risk fall precautions, as indicated3. 
  Problem: Respiratory - Adult  Goal: Achieves optimal ventilation and oxygenation  6/15/2025 0816 by Chris Hernandez RCP  Outcome: Progressing  6/14/2025 2134 by Lorena Mcknight RN  Outcome: Progressing     
  Problem: Respiratory - Adult  Goal: Achieves optimal ventilation and oxygenation  6/15/2025 1501 by Chris Hernandez RCP  Outcome: Progressing  6/15/2025 1410 by Marianela Bond RN  Outcome: Progressing  Flowsheets (Taken 6/15/2025 1345)  Achieves optimal ventilation and oxygenation:   Assess for changes in respiratory status   Assess for changes in mentation and behavior  6/15/2025 1228 by Birdie Travis RN  Outcome: Progressing  6/15/2025 0816 by Chris Hernandez RCP  Outcome: Progressing  Flowsheets (Taken 6/15/2025 0701 by Birdie Travis RN)  Achieves optimal ventilation and oxygenation:   Assess for changes in respiratory status   Assess for changes in mentation and behavior   Position to facilitate oxygenation and minimize respiratory effort   Oxygen supplementation based on oxygen saturation or arterial blood gases   Initiate smoking cessation protocol as indicated   Encourage broncho-pulmonary hygiene including cough, deep breathe, incentive spirometry   Assess the need for suctioning and aspirate as needed   Assess and instruct to report shortness of breath or any respiratory difficulty   Respiratory therapy support as indicated     
  Problem: Respiratory - Adult  Goal: Achieves optimal ventilation and oxygenation  6/17/2025 0918 by Candis Dalton RCP  Outcome: Progressing  6/16/2025 2231 by Tracy Turner RN  Outcome: Progressing  Flowsheets (Taken 6/16/2025 1940)  Achieves optimal ventilation and oxygenation:   Assess for changes in respiratory status   Assess for changes in mentation and behavior     
  Problem: Respiratory - Adult  Goal: Achieves optimal ventilation and oxygenation  Outcome: Progressing     
  Problem: Safety - Medical Restraint  Goal: Remains free of injury from restraints (Restraint for Interference with Medical Device)  Description: INTERVENTIONS:1. Determine that other, less restrictive measures have been tried or would not be effective before applying the restraint2. Evaluate the patient's condition at the time of restraint application3. Inform patient/family regarding the reason for restraint4. Q2H: Monitor safety, psychosocial status, comfort, nutrition and hydration  6/13/2025 0605 by Juan Manuel Abdi, RN  Outcome: Completed  6/13/2025 0359 by Juan Manuel Abdi, RN  Outcome: Progressing  Flowsheets (Taken 6/13/2025 0306)  Remains free of injury from restraints (restraint for interference with medical device):   Determine that other, less restrictive measures have been tried or would not be effective before applying the restraint   Evaluate the patient's condition at the time of restraint application   Inform patient/family regarding the reason for restraint   Every 2 hours: Monitor safety, psychosocial status, comfort, nutrition and hydration        
Patient seen and evaluated again this evening.     Patient demonstrated orthostatic hypotension this afternoon with a systolic blood pressure in the 70's while standing with his RN which improved to the 80's once he returned to his bed. He was given a 250cc albumin fluid challenge with initially no improvement in his blood pressure per report, but then his systolic blood pressure did come up to the low 100's and so he was given a second 250cc of albumin. He was transferred back to the CVICU for further evaluation.     He is in rate controlled atrial fibrillation and normotensive in the CVICU. Will continue oral amiodarone for now, and will plan for diltiazem gtt and transition to PICC and amio gtt if patient develops RVR or does not convert back to NSR with his oral amiodarone. He has converted back to NSR several times throughout the day.     Laboratory values notable for an elevated lactate, leukocytosis and a creatinine rise to >2 from 1.69. However, his baseline creatinine does appear to be around 2 from previous BMPs in our record.     His CT of his chest is notable for a right lower lobe infiltrate suspicious for aspiration pneumonia. He does have a productive cough at the time of my exam.     Discussed with critical care team. Initiating antibiotics with pharmacy consult for dosing given the patient's CKD and will ask speech to re-evaluate the patient.     Please message with questions or concerns    
Problem: Pain  Goal: Verbalizes/displays adequate comfort level or baseline comfort level  6/12/2025 0923 by Birdie Travis RN  Outcome: Progressing     Problem: Safety - Adult  Goal: Free from fall injury  6/12/2025 0923 by Birdie Travis RN  Outcome: Progressing  Flowsheets (Taken 6/12/2025 0715)  Free From Fall Injury: Instruct family/caregiver on patient safety     Problem: Chronic Conditions and Co-morbidities  Goal: Patient's chronic conditions and co-morbidity symptoms are monitored and maintained or improved  6/12/2025 0923 by Birdie Travis RN  Outcome: Progressing    Problem: Discharge Planning  Goal: Discharge to home or other facility with appropriate resources  6/12/2025 0923 by Birdie Travis RN  Outcome: Progressing  Flowsheets (Taken 6/12/2025 0715)  Discharge to home or other facility with appropriate resources:   Identify barriers to discharge with patient and caregiver   Arrange for needed discharge resources and transportation as appropriate   Identify discharge learning needs (meds, wound care, etc)   Refer to discharge planning if patient needs post-hospital services based on physician order or complex needs related to functional status, cognitive ability or social support system     Problem: Skin/Tissue Integrity  Goal: Skin integrity remains intact  Description: 1.  Monitor for areas of redness and/or skin breakdown2.  Assess vascular access sites hourly3.  Every 4-6 hours minimum:  Change oxygen saturation probe site4.  Every 4-6 hours:  If on nasal continuous positive airway pressure, respiratory therapy assess nares and determine need for appliance change or resting period  6/12/2025 0923 by Birdie Travis RN  Outcome: Progressing  Flowsheets (Taken 6/12/2025 0715)  Skin Integrity Remains Intact:   Monitor for areas of redness and/or skin breakdown   Assess vascular access sites hourly   Turn and reposition as indicated   Pressure redistribution bed/mattress (bed type)   Check 
Problem: Pain  Goal: Verbalizes/displays adequate comfort level or baseline comfort level  Outcome: Progressing     Problem: Safety - Adult  Goal: Free from fall injury  Outcome: Progressing  Flowsheets (Taken 6/15/2025 0701)  Free From Fall Injury: Instruct family/caregiver on patient safety     Problem: Chronic Conditions and Co-morbidities  Goal: Patient's chronic conditions and co-morbidity symptoms are monitored and maintained or improved  Outcome: Progressing  Flowsheets (Taken 6/15/2025 0701)  Care Plan - Patient's Chronic Conditions and Co-Morbidity Symptoms are Monitored and Maintained or Improved:   Collaborate with multidisciplinary team to address chronic and comorbid conditions and prevent exacerbation or deterioration   Update acute care plan with appropriate goals if chronic or comorbid symptoms are exacerbated and prevent overall improvement and discharge   Monitor and assess patient's chronic conditions and comorbid symptoms for stability, deterioration, or improvement     Problem: Discharge Planning  Goal: Discharge to home or other facility with appropriate resources  Outcome: Progressing  Flowsheets (Taken 6/15/2025 0701)  Discharge to home or other facility with appropriate resources:   Identify barriers to discharge with patient and caregiver   Arrange for needed discharge resources and transportation as appropriate   Identify discharge learning needs (meds, wound care, etc)   Refer to discharge planning if patient needs post-hospital services based on physician order or complex needs related to functional status, cognitive ability or social support system     Problem: Respiratory - Adult  Goal: Achieves optimal ventilation and oxygenation  6/15/2025 1228 by Birdie Travis, RN  Outcome: Progressing     Problem: Skin/Tissue Integrity  Goal: Skin integrity remains intact  Description: 1.  Monitor for areas of redness and/or skin breakdown2.  Assess vascular access sites hourly3.  Every 4-6 hours 
INTERVENTIONS:1. Assess for possible contributors to thought disturbance, including medications, impaired vision or hearing, underlying metabolic abnormalities, dehydration, psychiatric diagnoses, and notify attending LIP2. [place] high risk fall precautions, as indicated3. Provide frequent short contacts to provide reality reorientation, refocusing and direction4. Decrease environmental stimuli, including noise as appropriate5. Monitor and intervene to maintain adequate nutrition, hydration, elimination, sleep and activity6. If unable to ensure safety without constant attention obtain sitter and review sitter guidelines with assigned personnel7. Initiate Psychosocial CNS and Spiritual Care consult, as indicated  INTERVENTIONS:1. Assess for possible contributors to thought disturbance, including medications, impaired vision or hearing, underlying metabolic abnormalities, dehydration, psychiatric diagnoses, and notify attending LIP2. [place] high risk fall precautions, as indicated3. Provide frequent short contacts to provide reality reorientation, refocusing and direction4. Decrease environmental stimuli, including noise as appropriate5. Monitor and intervene to maintain adequate nutrition, hydration, elimination, sleep and activity6. If unable to ensure safety without constant attention obtain sitter and review sitter guidelines with assigned personnel7. Initiate Psychosocial CNS and Spiritual Care consult, as indicated  INTERVENTIONS:1. Assess for possible contributors to thought disturbance, including medications, impaired vision or hearing, underlying metabolic abnormalities, dehydration, psychiatric diagnoses, and notify attending LIP2. [place] high risk fall precautions, as indicated3. Provide frequent short contacts to provide reality reorientation, refocusing and direction4. Decrease environmental stimuli, including noise as appropriate5. Monitor and intervene to maintain adequate nutrition, hydration, 
as appropriate5. Monitor and intervene to maintain adequate nutrition, hydration, elimination, sleep and activity6. If unable to ensure safety without constant attention obtain sitter and review sitter guidelines with assigned personnel7. Initiate Psychosocial CNS and Spiritual Care consult, as indicated  INTERVENTIONS:1. Assess for possible contributors to thought disturbance, including medications, impaired vision or hearing, underlying metabolic abnormalities, dehydration, psychiatric diagnoses, and notify attending LIP2. [place] high risk fall precautions, as indicated3. Provide frequent short contacts to provide reality reorientation, refocusing and direction4. Decrease environmental stimuli, including noise as appropriate5. Monitor and intervene to maintain adequate nutrition, hydration, elimination, sleep and activity6. If unable to ensure safety without constant attention obtain sitter and review sitter guidelines with assigned personnel7. Initiate Psychosocial CNS and Spiritual Care consult, as indicated  INTERVENTIONS:1. Assess for possible contributors to thought disturbance, including medications, impaired vision or hearing, underlying metabolic abnormalities, dehydration, psychiatric diagnoses, and notify attending LIP2. [place] high risk fall precautions, as indicated3. Provide frequent short contacts to provide reality reorientation, refocusing and direction4. Decrease environmental stimuli, including noise as appropriate5. Monitor and intervene to maintain adequate nutrition, hydration, elimination, sleep and activity6. If unable to ensure safety without constant attention obtain sitter and review sitter guidelines with assigned personnel7. Initiate Psychosocial CNS and Spiritual Care consult, as indicated  INTERVENTIONS:1. Assess for possible contributors to thought disturbance, including medications, impaired vision or hearing, underlying metabolic abnormalities, dehydration, psychiatric
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
## 2025-06-17 NOTE — CARE COORDINATION
Patient has discharge orders to go home today with [facility]. Patient is aware and in agreement with this discharge today. CM informed [facility] patient will be discharged today.     No CM needs voiced or noted.     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 (HCC) [I25.110]  Chest pain [R07.9]  Tobacco abuse [Z72.0]  Chronic systolic heart failure (HCC) [I50.22]  CAD, multiple vessel [I25.10]  Date/Time of Admission: 6/11/2025  5:04 AM  Problem List:  Patient Active Problem List   Diagnosis    Type 2 diabetes mellitus with diabetic polyneuropathy, without long-term current use of insulin (HCC)    CAD in native artery    Elevated alkaline phosphatase level    Radiologic findings of lung field, abnormal    COPD (chronic obstructive pulmonary disease) (HCC)    CKD stage 3b, GFR 30-44 ml/min (HCC)    Tobacco abuse    Syncope and collapse    Sinus tachycardia    Basal cell carcinoma of scalp    Incarcerated left inguinal hernia    Hypertension    Syncope    Hiatal hernia    Hyperlipidemia    Renal cyst, left    Bilateral kidney stones    Olecranon bursitis of right elbow    Carotid atherosclerosis, bilateral    MOONEY (dyspnea on exertion)    Bilateral lower extremity edema    Chest pain    Chronic systolic congestive heart failure (HCC)    Atherosclerotic heart disease of native coronary artery with unstable angina pectoris (HCC)    Chronic systolic heart failure (HCC)    CAD, multiple vessel    S/P CABG x 3    Atelectasis    Chronic renal failure    Hypoxemia    Atrial fibrillation with RVR (HCC)    Elevated hemidiaphragm       Service Assessment  Patient Orientation Alert and Oriented   Cognition Alert   History Provided By Patient   Primary Caregiver Self   Accompanied By/Relationship     Support Systems Spouse/Significant Other   Patient's Healthcare Decision Maker is: Legal Next of

## 2025-06-17 NOTE — DISCHARGE SUMMARY
Discharge Summary     Patient ID:  Humphrey Villalpando  [id number]  79 y.o.  1945    Admit date: 6/11/2025    Discharge date:  6/17/2025    Admitting Physician: Bruce Skaggs MD     Discharge Physician: AUDREY Guzman/Dr. Skaggs    Admission Diagnoses: Atherosclerotic heart disease of native coronary artery with unstable angina pectoris ([facility]) [I25.110]  Chest pain [R07.9]  Tobacco abuse [Z72.0]  Chronic systolic heart failure ([facility]) [I50.22]  CAD, multiple vessel [I25.10]    Discharge Diagnoses:   Patient Active Problem List    Diagnosis Date Noted    Chronic systolic congestive heart failure ([facility]) 05/08/2025    Atrial fibrillation with RVR ([facility]) 06/16/2025    Elevated hemidiaphragm 06/16/2025    Hypoxemia 06/12/2025    CAD, multiple vessel 06/11/2025    S/P CABG x 3 06/11/2025    Atelectasis 06/11/2025    Chronic renal failure 06/11/2025    Atherosclerotic heart disease of native coronary artery with unstable angina pectoris (HCC) 05/29/2025    Chronic systolic heart failure ([facility]) 05/29/2025    Chest pain 04/22/2025    Carotid atherosclerosis, bilateral 03/05/2025    MOONEY (dyspnea on exertion) 03/05/2025    Bilateral lower extremity edema 03/05/2025    Olecranon bursitis of right elbow 09/08/2023    Radiologic findings of lung field, abnormal 02/16/2022    Incarcerated left inguinal hernia 02/16/2022    Hiatal hernia 02/16/2022    Renal cyst, left 02/16/2022    Bilateral kidney stones 02/16/2022    Elevated alkaline phosphatase level 02/15/2022    Syncope 10/15/2021    Syncope and collapse 10/14/2021    CKD stage 3b, GFR 30-44 ml/min ([facility]) 06/21/2021    Sinus tachycardia 05/20/2020    CAD in native artery 05/25/2017    Tobacco abuse 05/25/2017    Basal cell carcinoma of scalp 09/14/2015    Type 2 diabetes mellitus with diabetic polyneuropathy, without long-term current use of insulin ([facility]) 07/01/2015    COPD (chronic obstructive pulmonary disease) ([facility]) 07/01/2015

## 2025-06-18 ENCOUNTER — CARE COORDINATION (OUTPATIENT)
Dept: CARE COORDINATION | Facility: CLINIC | Age: 80
End: 2025-06-18

## 2025-06-18 ENCOUNTER — TELEPHONE (OUTPATIENT)
Age: 80
End: 2025-06-18

## 2025-06-18 NOTE — TELEPHONE ENCOUNTER
Care Transitions Initial Follow Up Call    Call within 2 business days of discharge: Yes     Patient: Humphrey Villalpando Patient : 1945 MRN: [id number]    [unfilled]    RARS: Readmission Risk Score: 17       Spoke with: Patient    Discharge department/facility: [facility]    Non-face-to-face services provided:  Transitional Care call to FU on 25-25 hospitalization at [facility] for CAD with CABG x 3, chronic systolic heart failure, and atrial fibrillation. Left message on voicemail for patient to call this triage nurse. Spoke with wife, Katelyn, who states patient has been doing very well, slept well last night, and had shower, this morning. Home health nurse called, and will be calling back to schedule visit. Reviewed 25 [facility] D/C summary instructions with Katelyn, including diet, activities, signs of infection, and medications. Instructed Katelyn to weigh patient every day and call to report any weight gain of 2 lbs overnight or 5 lbs in 1 week, or any increase in SOB or peripheral edema. Instructed Katelyn that patient should wear compression hose and keep feet elevated as much as possible. Instructed Katelyn to call Dr. Skaggs to report any signs of infection. Reviewed meds. Reminded Katelyn of 25 8:30 am MA appointment with Dr. Ramos. Instructed Katelyn to take patient to [facility] ER for immediate evaluation of any persistent chest pain or SOB.  Advised Katelyn to call with any questions or concerns prior to appointment. Katelyn verbalized understanding and states she has no questions or concerns at this time.      Follow Up  Future Appointments   Date Time Provider Department Center   2025  8:30 AM Ricki Ramos MD [facility] AMB   2025  2:00 PM Maggie Ambriz RN [facility]   2025  2:40 PM Bruce Skaggs MD [facility] AMB   2025  1:30 PM Ricki Ramos MD [facility] AMB   2025  2:00 PM Terry Esparza MD [facility] AMB   2025  2:40 PM Stephen

## 2025-06-18 NOTE — PROGRESS NOTES
Humphrey Villalpando  Admission Date: 6/11/2025         Daily Progress Note: 6/16/2025    The patient's chart is reviewed and the patient is discussed with the staff.    Background:   79 y.o. male  has a past medical history of CAD (coronary artery disease), Chronic airway obstruction (ratio 70, FEV1 54%), Erectile dysfunction, Essential hypertension, benign, H/O echocardiogram, History of colonic polyps, Inflammatory and toxic neuropathy, unspecified, Kidney disease, Kidney stone, Neoplasm of uncertain behavior of skin, Neuralgia, neuritis, and radiculitis, unspecified, Neuropathy of both feet, Osteoarthritis, Other and unspecified hyperlipidemia, Paralysis of diaphragm, PONV (postoperative nausea and vomiting), Prolonged emergence from general anesthesia, Retention of urine, Type II or unspecified type diabetes mellitus without mention of complication, uncontrolled, Ulcer of ankle (HCC), and Urinary incontinence.       S/P CABG x 3.  Required transfer back to CVICU on 6/15 for hypotension. Started on abx for suspected pneumonia LLL.  Nephrology seeing for NAVNEET.        Subjective:    Sitting up in the chair.  He reports some dyspnea which he associates with chest pain.  Working with IS and TV just under 1 liter. No congestion.      Current Facility-Administered Medications   Medication Dose Route Frequency    sodium chloride flush 0.9 % injection 5-40 mL  5-40 mL IntraVENous PRN    ondansetron (ZOFRAN-ODT) disintegrating tablet 4 mg  4 mg Oral Q8H PRN    Or    ondansetron (ZOFRAN) injection 4 mg  4 mg IntraVENous Q6H PRN    sennosides-docusate sodium (SENOKOT-S) 8.6-50 MG tablet 1 tablet  1 tablet Oral BID    magnesium hydroxide (MILK OF MAGNESIA) 400 MG/5ML suspension 30 mL  30 mL Oral Daily PRN    insulin lispro (HUMALOG,ADMELOG) injection vial 0-12 Units  0-12 Units SubCUTAneous TID WC    insulin lispro (HUMALOG,ADMELOG) injection vial 0-6 Units  0-6 Units SubCUTAneous Nightly    glucose chewable 
             Today's Date: 6/14/2025  Date of Admission: 6/11/2025    Chart Reviewed.  Subjective:     POD3 CABG  Patient is resting in his chair, poor appetite  Atrial fibrillation overnight, patient converted back to NSR this AM    Medications Reviewed.    Objective:     Vitals:    06/14/25 0200 06/14/25 0300 06/14/25 0615 06/14/25 0719   BP: 112/71 100/68  109/74   Pulse: 80 69  82   Resp:  22  28   Temp:  98 °F (36.7 °C)  97.7 °F (36.5 °C)   TempSrc:  Oral  Oral   SpO2:  95%  94%   Weight:   92 kg (202 lb 13.2 oz)    Height:           Intake and Output  Current Shift: No intake/output data recorded.   Last 3 Shifts: 06/12 1901 - 06/14 0700  In: 919.7 [P.O.:670; I.V.:249.7]  Out: 3195 [Urine:2985]    Physical Exam  Physical Exam:  General: Well Developed, No Acute Distress, hard of hearing  HEENT: pupils equal and round, no abnormalities noted  Neck: supple, no JVD  Heart: normotensive, normal heart rate  Lungs: oxygenating well on nasal cannula, normal respiratory effort, no air leak in pleuravac  Abd: soft, nontender, nondistended  Ext: warm, mild edema  Skin: warm and dry  Psychiatric: Normal mood and affect  Neurologic: Alert and oriented X 3    LAB  Recent Labs     06/12/25  0414 06/13/25  0253 06/14/25  0342   WBC 12.3* 14.3* 14.0*   HGB 13.4* 13.2* 14.4   HCT 41.3 40.8* 43.0    147* 147*     Recent Labs     06/11/25  1251 06/11/25  1711 06/12/25  0414 06/13/25  0253 06/14/25  0342      < > 142 141 140   K 4.1   < > 5.0 5.1 4.2   *   < > 111* 109* 104   CO2 22   < > 21 21 23   BUN 35*   < > 33* 42* 42*   MG 3.6*   < > 2.8* 2.8* 2.4   INR 1.3  --   --   --   --     < > = values in this interval not displayed.     No results for input(s): \"PH\", \"PCO2\", \"PO2\", \"HCO3\" in the last 72 hours.    Assessment/Plan:      POD3 CABG, no pneumothorax on CXR  Chest tubes placed to water seal this AM, repeat CXR in 4 hours, if no pneumothorax will remove chest tubes  Appreciate nephrology assistance 
           Pulmonary CV progress Note: 6/12/2025        Humphrey Villalpando                                                     Admission Date: 6/11/2025     The patient's chart is reviewed and the patient is discussed with the staff.    Background: 79 y.o. y/o male  has a past medical history of CAD (coronary artery disease), Chronic airway obstruction, not elsewhere classified, Erectile dysfunction, Essential hypertension, benign, H/O echocardiogram, History of colonic polyps, Inflammatory and toxic neuropathy, unspecified, Kidney disease, Kidney stone, Neoplasm of uncertain behavior of skin, Neuralgia, neuritis, and radiculitis, unspecified, Neuropathy of both feet, Osteoarthritis, Other and unspecified hyperlipidemia, Paralysis of diaphragm, PONV (postoperative nausea and vomiting), Prolonged emergence from general anesthesia, Retention of urine, Type II or unspecified type diabetes mellitus without mention of complication, uncontrolled, Ulcer of ankle (HCC), and Urinary incontinence. CAD, multiple vessel; 1 Day Post-Op. He was a difficult intubation per staff. He is an active smoker and has COPD. He is followed in our office. He has a paralyzed right hemidiaphragm.     Subjective:     Extubated last night post op. On AirVO, remains on 50L/50%. Had air leak last night, but rare now, but CXR with ptx on AM CXR.     Review of Systems: Comprehensive ROS negative except in HPI  Objective:   Blood pressure 92/66, pulse 75, temperature 99.1 °F (37.3 °C), temperature source Bladder, resp. rate 20, height 1.753 m (5' 9\"), weight 96.6 kg (212 lb 15.4 oz), SpO2 93%.   Intake/Output Summary (Last 24 hours) at 6/12/2025 0829  Last data filed at 6/12/2025 0641  Gross per 24 hour   Intake 2350.67 ml   Output 1324 ml   Net 1026.67 ml     Physical Exam:          Constitutional:  Awake, calm  EENMT:  Sclera clear, pupils equal  Respiratory: clear bilaterally, diminished in bases  Cardiovascular:  RRR with no 
           Pulmonary CV progress Note: 6/13/2025        Humphrey Villalpando                                                     Admission Date: 6/11/2025     The patient's chart is reviewed and the patient is discussed with the staff.    Background: 79 y.o. y/o male  has a past medical history of CAD (coronary artery disease), Chronic airway obstruction, not elsewhere classified, Erectile dysfunction, Essential hypertension, benign, H/O echocardiogram, History of colonic polyps, Inflammatory and toxic neuropathy, unspecified, Kidney disease, Kidney stone, Neoplasm of uncertain behavior of skin, Neuralgia, neuritis, and radiculitis, unspecified, Neuropathy of both feet, Osteoarthritis, Other and unspecified hyperlipidemia, Paralysis of diaphragm, PONV (postoperative nausea and vomiting), Prolonged emergence from general anesthesia, Retention of urine, Type II or unspecified type diabetes mellitus without mention of complication, uncontrolled, Ulcer of ankle (HCC), and Urinary incontinence. S/P CABG x 3; 2 Days Post-Op. He was a difficult intubation per staff. He is an active smoker and has COPD. He is followed in our office. He has a paralyzed right hemidiaphragm.     Subjective:     Weaned from AirVo to NC. In chair on 6L NC. Diuresed 800cc net negative. Feels better.     Review of Systems: Comprehensive ROS negative except in HPI  Objective:   Blood pressure 125/69, pulse 60, temperature 97.6 °F (36.4 °C), temperature source Axillary, resp. rate 21, height 1.753 m (5' 9\"), weight 93.8 kg (206 lb 12.7 oz), SpO2 94%.   Intake/Output Summary (Last 24 hours) at 6/13/2025 0818  Last data filed at 6/13/2025 0600  Gross per 24 hour   Intake 253.33 ml   Output 980 ml   Net -726.67 ml     Physical Exam:          Constitutional:  Awake, calm  EENMT:  Sclera clear, pupils equal  Respiratory: clear bilaterally, diminished in bases  Cardiovascular:  RRR with no M,G,R;  Gastrointestinal:  soft; + bowel sounds 
           Pulmonary CV progress Note: 6/14/2025        Humphrey Villalpando                                                     Admission Date: 6/11/2025     The patient's chart is reviewed and the patient is discussed with the staff.    Background: 79 y.o. y/o male  has a past medical history of CAD (coronary artery disease), Chronic airway obstruction, not elsewhere classified, Erectile dysfunction, Essential hypertension, benign, H/O echocardiogram, History of colonic polyps, Inflammatory and toxic neuropathy, unspecified, Kidney disease, Kidney stone, Neoplasm of uncertain behavior of skin, Neuralgia, neuritis, and radiculitis, unspecified, Neuropathy of both feet, Osteoarthritis, Other and unspecified hyperlipidemia, Paralysis of diaphragm, PONV (postoperative nausea and vomiting), Prolonged emergence from general anesthesia, Retention of urine, Type II or unspecified type diabetes mellitus without mention of complication, uncontrolled, Ulcer of ankle (HCC), and Urinary incontinence. S/P CABG x 3; 3 Days Post-Op.    Subjective:     Ct tubes just pulled, going for CXR now with R hemidiaphragm and area or density above R diaphragm.   Afib overnight, poor dietary intake due to doesn't like the food.  NSR this am.  On 4L NC, does not wear O2 at home.  Sats currently 93%    Review of Systems: Comprehensive ROS negative except in HPI  Objective:   Blood pressure 94/66, pulse 88, temperature 97.2 °F (36.2 °C), temperature source Oral, resp. rate 24, height 1.753 m (5' 9\"), weight 92 kg (202 lb 13.2 oz), SpO2 93%.   Intake/Output Summary (Last 24 hours) at 6/14/2025 1330  Last data filed at 6/14/2025 1256  Gross per 24 hour   Intake 317.5 ml   Output 2650 ml   Net -2332.5 ml     Physical Exam:          Constitutional:  Awake, calm  EENMT:  Sclera clear, pupils equal  Respiratory: BLS rhonchi on 4L  Cardiovascular:  RRR with no M,G,R;  Gastrointestinal:  soft; active bowel sounds present  Musculoskeletal:  warm with no 
           Pulmonary CV progress Note: 6/15/2025        Humphrey Villalpando                                                     Admission Date: 6/11/2025     The patient's chart is reviewed and the patient is discussed with the staff.    Background: 79 y.o. y/o male  has a past medical history of CAD (coronary artery disease), Chronic airway obstruction, not elsewhere classified, Erectile dysfunction, Essential hypertension, benign, H/O echocardiogram, History of colonic polyps, Inflammatory and toxic neuropathy, unspecified, Kidney disease, Kidney stone, Neoplasm of uncertain behavior of skin, Neuralgia, neuritis, and radiculitis, unspecified, Neuropathy of both feet, Osteoarthritis, Other and unspecified hyperlipidemia, Paralysis of diaphragm, PONV (postoperative nausea and vomiting), Prolonged emergence from general anesthesia, Retention of urine, Type II or unspecified type diabetes mellitus without mention of complication, uncontrolled, Ulcer of ankle (HCC), and Urinary incontinence. S/P CABG x 3; 4 Days Post-Op.    Subjective:     Still on 4L NC. Moved back to CVICU for hypotension and concern for pneumonia. Given some IVF, started on abx. Feels better. Asking to go back up to step down.     Review of Systems: Comprehensive ROS negative except in HPI  Objective:   Blood pressure (!) 126/59, pulse 82, temperature 97.8 °F (36.6 °C), temperature source Oral, resp. rate 14, height 1.753 m (5' 9\"), weight 96.3 kg (212 lb 4.9 oz), SpO2 95%.   Intake/Output Summary (Last 24 hours) at 6/15/2025 0914  Last data filed at 6/15/2025 0609  Gross per 24 hour   Intake 720 ml   Output 1800 ml   Net -1080 ml     Physical Exam:          Constitutional:  Awake, calm  EENMT:  Sclera clear, pupils equal  Respiratory: clear today on 4L  Cardiovascular:  RRR with no M,G,R;  Gastrointestinal:  soft; active bowel sounds present  Musculoskeletal:  warm with no cyanosis, 2+ lower extremity edema.   SKIN:  no jaundice or ecchymosis 
          Today's Date: 6/15/2025  Date of Admission: 6/11/2025    POD 4 Days Post-Op    Chart Reviewed.  Subjective:     POD4 CABG  Patient is asleep this AM  Transferred back to CVICU for hypotension, concern for over diuresis and possible aspiration pneumonia    Medications Reviewed.    Objective:       Vitals:    06/15/25 0401 06/15/25 0500 06/15/25 0600 06/15/25 0701   BP: 121/60 (!) 114/58  127/71   Pulse: 73 65  77   Resp: 21 19  18   Temp:       TempSrc:       SpO2: 96% 98%  96%   Weight:   96.3 kg (212 lb 4.9 oz)    Height:           Intake and Output  Current Shift: No intake/output data recorded.   Last 3 Shifts: 06/13 1901 - 06/15 0700  In: 797.5 [P.O.:770; I.V.:27.5]  Out: 3770 [Urine:3700]    Physical Exam:  General: Well Developed, No Acute Distress  HEENT: eyes closed, snoring  Neck: supple, no JVD  Heart: normotensive, irregularly irregular rhythm  Lungs: oxygenating well on nasal cannula, normal respiratory effort  Abd: soft, nondistended  Ext: warm, mild edema  Skin: warm and dry      LABS  Data Review:   Recent Labs     06/14/25  0342 06/14/25  1843 06/15/25  0338    139 142   K 4.2 HEMOLYZED SPECIMEN 3.4*   MG 2.4  --  2.2   BUN 42* 46* 43*   WBC 14.0* 12.9*  --    HGB 14.4 12.5*  --    HCT 43.0 37.5*  --    * 153  --        No results for input(s): \"TNIPOC\" in the last 72 hours.    Invalid input(s): \"TROIQ\"    Estimated Creatinine Clearance: 36 mL/min (A) (based on SCr of 1.86 mg/dL (H)).      Assessment/Plan:     Active Hospital Problems    Hypoxemia      CAD, multiple vessel      *S/P CABG x 3      Atelectasis      Chronic renal failure      Pre-op testing      Chronic systolic heart failure (HCC)      Atherosclerotic heart disease of native coronary artery with unstable angina pectoris (HCC)      Chest pain      Tobacco abuse      COPD (chronic obstructive pulmonary disease) ([facility])     POD4 CABG,   Appreciate nephrology and pulmonary assistance   Increased tylenol dose to 1000mg 
          Today's Date: 6/16/2025  Date of Admission: 6/11/2025    POD 5 Days Post-Op    Chart Reviewed.  Subjective:     Patient feels well. He was unaware of a-fib. He is refusing thickened liquids.     Medications Reviewed.    Objective:       Vitals:    06/16/25 0715 06/16/25 0716 06/16/25 0717 06/16/25 0718   BP:       Pulse: (!) 103 80 (!) 109 79   Resp: 18      Temp:       TempSrc:       SpO2: 94%      Weight:       Height:           Intake and Output  Current Shift: 06/16 0701 - 06/16 1900  In: 120 [P.O.:120]  Out: -    Last 3 Shifts: 06/14 1901 - 06/16 0700  In: 2929.4 [P.O.:954]  Out: 2950 [Urine:2950]    Physical Exam:  General: Well Developed, Well Nourished, No Acute Distress, Alert & Oriented x 3, Appropriate mood  Neck: supple, no JVD  Heart: S1S2 with IRR   Lungs: Clear throughout auscultation bilaterally without adventitious sounds  Abd: soft, nontender, nondistended, with good bowel sounds  Ext: no edema bilaterally  Skin: warm and dry    LABS  Data Review:   Recent Labs     06/14/25  1843 06/15/25  0338 06/16/25  0316    142 140   K HEMOLYZED SPECIMEN 3.4* 4.2   MG  --  2.2 2.2   BUN 46* 43* 46*   WBC 12.9*  --  11.0   HGB 12.5*  --  12.0*   HCT 37.5*  --  36.3*     --  175       Estimated Creatinine Clearance: 37 mL/min (A) (based on SCr of 1.8 mg/dL (H)).      Assessment/Plan:      *S/P CABG x 3  On ASA, resume low dose BB, intermittent a-fib/flutter with RVR this morning, increase oral amiodarone, pacing wires in place, continue PT     Active Hospital Problems    Atrial fibrillation with RVR ([facility])  Increase amiodarone, no anticoagulation for now with pacing wires in place      Hypoxemia  On O2 by NC      CAD, multiple vessel  S/p CABG      Atelectasis  IS       Chronic renal failure  Renal function stable, nephrology following     Atherosclerotic heart disease of native coronary artery with unstable angina pectoris (HCC)  S/p CABG, medical therapy as hemodynamics allow       Chest 
    Slight improvement in L apical Ptx.  Overall, clinically stable. Will continue to monitor for now. Will repeat CXR at 3.    John Larson MD  
  Physical therapy note: Attempted PT this AM. Pt on held per RN.  Will continue to treat as pt is able and time/schedule permit.    Steph Martinez, PTA    Rehab Caseload Tracker   
  TIMEOUT performed prior to extubation on Humphrey Villalpando with RT, primary RN, and charge RN present on 6/11/2025 at 6:30 PM.     Per anesthesia team on admission, patient's intubation was no acute fracture or dislocation    ABG results as follows:  No results found for: \"IBD\"      The patient is hemodynamically stable and can demonstrate the following on a consistent basis:  follow commands , elevate head off the pillow, nods appropriately to questions.      Dr. Gonzalez notified of weaning parameters and order to extubate obtained.     Patient extubated by (RT name) Sukhdev to (Type of O2 Device) Airvo at (Amount of of O2) 50L/50% without complication.   
4 Eyes Skin Assessment     NAME:  Humphrey Villalpando  YOB: 1945  MEDICAL RECORD NUMBER:  [id number]    The patient is being assessed for  Transfer to New Unit    I agree that at least one RN has performed a thorough Head to Toe Skin Assessment on the patient. ALL assessment sites listed below have been assessed.      Areas assessed by both nurses:    Head, Face, Ears, Shoulders, Back, Chest, Arms, Elbows, Hands, Sacrum. Buttock, Coccyx, Ischium, Legs. Feet and Heels, and Under Medical Devices         Does the Patient have a Wound? No noted wound(s)       Lenny Prevention initiated by RN: Yes  Wound Care Orders initiated by RN: No    Pressure Injury (Stage 3,4, Unstageable, DTI, NWPT, and Complex wounds) if present, place Wound referral order by RN under : No    New Ostomies, if present place, Ostomy referral order under : No     Nurse 1 eSignature: Electronically signed by Marianela Bond RN on 6/13/25 at 6:12 PM EDT    **SHARE this note so that the co-signing nurse can place an eSignature**    Nurse 2 eSignature: Electronically signed by aSndra Quintero RN on 6/13/25 at 7:27 PM EDT    
4 Eyes Skin Assessment     NAME:  Humphrey Villalpando  YOB: 1945  MEDICAL RECORD NUMBER:  [id number]    The patient is being assessed for  Post-Op Surgical    I agree that at least one RN has performed a thorough Head to Toe Skin Assessment on the patient. ALL assessment sites listed below have been assessed.      Areas assessed by both nurses:    Head, Face, Ears, Shoulders, Back, Chest, Arms, Elbows, Hands, Sacrum. Buttock, Coccyx, Ischium, Legs. Feet and Heels, and Under Medical Devices         Does the Patient have a Wound? No noted wound(s)       Lenny Prevention initiated by RN: Yes  Wound Care Orders initiated by RN: No    Pressure Injury (Stage 3,4, Unstageable, DTI, NWPT, and Complex wounds) if present, place Wound referral order by RN under : No    New Ostomies, if present place, Ostomy referral order under : No     Nurse 1 eSignature: Electronically signed by Jose Alberto Valdez RN on 6/11/25 at 5:38 PM EDT    **SHARE this note so that the co-signing nurse can place an eSignature**    Nurse 2 eSignature: {Esignature:[id number]}    
4 Eyes Skin Assessment     NAME:  Humphrey Villalpando  YOB: 1945  MEDICAL RECORD NUMBER:  [id number]    The patient is being assessed for  Transfer to New Unit    I agree that at least one RN has performed a thorough Head to Toe Skin Assessment on the patient. ALL assessment sites listed below have been assessed.      Areas assessed by both nurses:    Head, Face, Ears, Shoulders, Back, Chest, Arms, Elbows, Hands, Sacrum. Buttock, Coccyx, Ischium, Legs. Feet and Heels, and Under Medical Devices         Does the Patient have a Wound? No noted wound(s)       Lenny Prevention initiated by RN: Yes  Wound Care Orders initiated by RN: No    Pressure Injury (Stage 3,4, Unstageable, DTI, NWPT, and Complex wounds) if present, place Wound referral order by RN under : No    New Ostomies, if present place, Ostomy referral order under : No     Nurse 1 eSignature: Electronically signed by Marianela Bond RN on 6/15/25 at 6:05 PM EDT    **SHARE this note so that the co-signing nurse can place an eSignature**    Nurse 2 eSignature: Electronically signed by Lucius Coelho RN on 6/15/25 at 6:06 PM EDT    
4 Eyes Skin Assessment     NAME:  Humphrey Villalpando  YOB: 1945  MEDICAL RECORD NUMBER:  [id number]    The patient is being assessed for  Post-Op Surgical    I agree that at least one RN has performed a thorough Head to Toe Skin Assessment on the patient. ALL assessment sites listed below have been assessed.      Areas assessed by both nurses:    Head, Face, Ears, Shoulders, Back, Chest, Arms, Elbows, Hands, Sacrum. Buttock, Coccyx, Ischium, Legs. Feet and Heels, and Under Medical Devices         Does the Patient have a Wound? No noted wound(s)       Lenny Prevention initiated by RN: Yes  Wound Care Orders initiated by RN: No    Pressure Injury (Stage 3,4, Unstageable, DTI, NWPT, and Complex wounds) if present, place Wound referral order by RN under : No    New Ostomies, if present place, Ostomy referral order under : No     Nurse 1 eSignature: Electronically signed by Jose Alberto Valdez RN on 6/11/25 at 3:30 PM EDT    **SHARE this note so that the co-signing nurse can place an eSignature**    Nurse 2 eSignature: Electronically signed by Birdie Travis RN on 6/11/25 at 6:44 PM EDT   
ACUTE PHYSICAL THERAPY GOALS:   (Developed with and agreed upon by patient and/or caregiver.)  LTG:  (1.)Mr. Villalpando will move from supine to sit and sit to supine , scoot up and down, and roll side to side in bed with INDEPENDENT within 7 treatment day(s).    (2.)Mr. Villalpando will transfer from bed to chair and chair to bed with STAND BY ASSIST using the least restrictive device within 7 treatment day(s).    (3.)Mr. Villalpando will ambulate with STAND BY ASSIST for 500+ feet with the least restrictive device within 7 treatment day(s).  (4.)Mr. Villalpando will perform 4 stairs with HR and CGA within 7 treatment days for ascending and descending stairs for home.   (5.)Mr. Villalpando will be independent with HEP for strengthening and functional mobility within 7 treatment days for increased activity tolerance.    PHYSICAL THERAPY: Daily Note AM   (Link to Caseload Tracking: PT Visit Days : 2  Time In/Out PT Charge Capture  Rehab Caseload Tracker  Orders    Humphrey Villalpando is a 79 y.o. 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 Villalpando was supine on contact and agreeable to work with therapy. The PT session today focused on therapeutic activities including ambulation, bed mobility, sit to stand transfer, therapeutic exercise, and toilet transfers. Pt 
ACUTE PHYSICAL THERAPY GOALS:   (Developed with and agreed upon by patient and/or caregiver.)  LTG:  (1.)Mr. Villalpando will move from supine to sit and sit to supine , scoot up and down, and roll side to side in bed with INDEPENDENT within 7 treatment day(s).    (2.)Mr. Villalpando will transfer from bed to chair and chair to bed with STAND BY ASSIST using the least restrictive device within 7 treatment day(s).    (3.)Mr. Villalpando will ambulate with STAND BY ASSIST for 500+ feet with the least restrictive device within 7 treatment day(s).  (4.)Mr. Villalpando will perform 4 stairs with HR and CGA within 7 treatment days for ascending and descending stairs for home.   (5.)Mr. Villalpando will be independent with HEP for strengthening and functional mobility within 7 treatment days for increased activity tolerance.    PHYSICAL THERAPY: Daily Note AM   (Link to Caseload Tracking: PT Visit Days : 3  Time In/Out PT Charge Capture  Rehab Caseload Tracker  Orders    Humphrey Villalpando is a 79 y.o. 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 (HCC) [I25.110]  Chest pain [R07.9]  Tobacco abuse [Z72.0]  Chronic systolic heart failure (HCC) [I50.22]  CAD, multiple vessel [I25.10]  Procedure(s) (LRB):  CORONARY ARTERY BYPASS GRAFTING (CABG X3) LIMA, LEFT LEG ENDOSCOPIC SAPHENOUS VEIN HARVEST (N/A)  TRANSESOPHAGEAL ECHOCARDIOGRAM (N/A)  4 Days Post-Op  Inpatient: Payor: [employer] MEDICARE / Plan: [employer] G[employer]US HMO / Product Type: *No Product type* /     ASSESSMENT:     REHAB RECOMMENDATIONS:   Recommendation to date pending progress:  Setting:  Home Health Therapy pending progress    Equipment:    To Be Determined     ASSESSMENT:  Mr. Villalpando was up in chair on contact and agreeable to work with therapy. The PT session today focused on therapeutic activities including ambulation, sit to stand transfer, and therapeutic exercise. Pt needed Min a to CGA for amb 
ACUTE PHYSICAL THERAPY GOALS:   (Developed with and agreed upon by patient and/or caregiver.)  LTG:  (1.)Mr. Villalpando will move from supine to sit and sit to supine , scoot up and down, and roll side to side in bed with INDEPENDENT within 7 treatment day(s).    (2.)Mr. Villalpando will transfer from bed to chair and chair to bed with STAND BY ASSIST using the least restrictive device within 7 treatment day(s).    (3.)Mr. Villalpando will ambulate with STAND BY ASSIST for 500+ feet with the least restrictive device within 7 treatment day(s).  (4.)Mr. Villalpando will perform 4 stairs with HR and CGA within 7 treatment days for ascending and descending stairs for home.   (5.)Mr. Villalpando will be independent with HEP for strengthening and functional mobility within 7 treatment days for increased activity tolerance.    PHYSICAL THERAPY: Daily Note AM   (Link to Caseload Tracking: PT Visit Days : 4  Time In/Out PT Charge Capture  Rehab Caseload Tracker  Orders    Humphrey Villalpando is a 79 y.o. 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 Villalpando is sitting in the recliner and agreeable to therapy.  Sit to stand using the momentum method balance good.  Gait training with rolling walker x 100 feet with slow lynn and cues to improve posture and safety, patient 
ACUTE PHYSICAL THERAPY GOALS:   (Developed with and agreed upon by patient and/or caregiver.)  LTG:  (1.)Mr. Villalpando will move from supine to sit and sit to supine , scoot up and down, and roll side to side in bed with INDEPENDENT within 7 treatment day(s).    (2.)Mr. Villalpando will transfer from bed to chair and chair to bed with STAND BY ASSIST using the least restrictive device within 7 treatment day(s).    (3.)Mr. Villalpando will ambulate with STAND BY ASSIST for 500+ feet with the least restrictive device within 7 treatment day(s).  (4.)Mr. Villalpando will perform 4 stairs with HR and CGA within 7 treatment days for ascending and descending stairs for home.   (5.)Mr. Villalpando will be independent with HEP for strengthening and functional mobility within 7 treatment days for increased activity tolerance.    PHYSICAL THERAPY: Daily Note PM   (Link to Caseload Tracking: PT Visit Days : 1  Time In/Out PT Charge Capture  Rehab Caseload Tracker  Orders    Humphrey Villalpando is a 79 y.o. 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 Villalpando is in chair on arrival, drowsy. Pt oriented only to self this afternoon, does not recall having surgery or that is in hospital. Pt with constant reminders regarding sternal precautions, required mod/max A scooting to 
ACUTE PHYSICAL THERAPY GOALS:   (Developed with and agreed upon by patient and/or caregiver.)  LTG:  (1.)Mr. Villalpando will move from supine to sit and sit to supine , scoot up and down, and roll side to side in bed with INDEPENDENT within 7 treatment day(s).    (2.)Mr. Villalpando will transfer from bed to chair and chair to bed with STAND BY ASSIST using the least restrictive device within 7 treatment day(s).    (3.)Mr. Villalpando will ambulate with STAND BY ASSIST for 500+ feet with the least restrictive device within 7 treatment day(s).  (4.)Mr. Villalpando will perform 4 stairs with HR and CGA within 7 treatment days for ascending and descending stairs for home.   (5.)Mr. Villalpando will be independent with HEP for strengthening and functional mobility within 7 treatment days for increased activity tolerance.    PHYSICAL THERAPY: Daily Note PM   (Link to Caseload Tracking: PT Visit Days : 2  Time In/Out PT Charge Capture  Rehab Caseload Tracker  Orders    Humphrey Villalpando is a 79 y.o. 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 Villalpando was supine on contact sleeping. He was awakened and agreeable to work with therapy. The PT session today focused on therapeutic activities including ambulation, bed mobility, and sit to stand transfer. Pt needed moderate 
ACUTE PHYSICAL THERAPY GOALS:   (Developed with and agreed upon by patient and/or caregiver.)  LTG:  (1.)Mr. Villalpando will move from supine to sit and sit to supine , scoot up and down, and roll side to side in bed with INDEPENDENT within 7 treatment day(s).    (2.)Mr. Villalpando will transfer from bed to chair and chair to bed with STAND BY ASSIST using the least restrictive device within 7 treatment day(s).    (3.)Mr. Villalpando will ambulate with STAND BY ASSIST for 500+ feet with the least restrictive device within 7 treatment day(s).  (4.)Mr. Villalpando will perform 4 stairs with HR and CGA within 7 treatment days for ascending and descending stairs for home.   (5.)Mr. Villalpando will be independent with HEP for strengthening and functional mobility within 7 treatment days for increased activity tolerance.    PHYSICAL THERAPY: Daily Note PM   (Link to Caseload Tracking: PT Visit Days : 4  Time In/Out PT Charge Capture  Rehab Caseload Tracker  Orders    Humphrey Villalpando is a 79 y.o. 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 Villalpando is sitting in the recliner and agreeable to therapy.  Sit to stand using the momentum method balance good.  Gait training with rolling walker x 100 feet with slow lynn and cues to improve posture and safety, patient 
ACUTE PHYSICAL THERAPY GOALS:   (Developed with and agreed upon by patient and/or caregiver.)  LTG:  (1.)Mr. Villalpando will move from supine to sit and sit to supine , scoot up and down, and roll side to side in bed with INDEPENDENT within 7 treatment day(s).    (2.)Mr. Villalpando will transfer from bed to chair and chair to bed with STAND BY ASSIST using the least restrictive device within 7 treatment day(s).    (3.)Mr. Villalpando will ambulate with STAND BY ASSIST for 500+ feet with the least restrictive device within 7 treatment day(s).  (4.)Mr. Villalpando will perform 4 stairs with HR and CGA within 7 treatment days for ascending and descending stairs for home.   (5.)Mr. Villalpando will be independent with HEP for strengthening and functional mobility within 7 treatment days for increased activity tolerance.   ________________________________________________________________________________________________       PHYSICAL THERAPY Initial Assessment and AM  (Link to Caseload Tracking: PT Visit Days : 1  Acknowledge Orders  Time In/Out  PT Charge Capture  Rehab Caseload Tracker    Humphrey Villalpando is a 79 y.o. 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 (HCC) [I25.110]  Chest pain [R07.9]  Tobacco abuse [Z72.0]  Chronic systolic heart failure (HCC) [I50.22]  CAD, multiple vessel [I25.10]  Procedure(s) (LRB):  CORONARY ARTERY BYPASS GRAFTING (CABG X3) LIMA, LEFT LEG ENDOSCOPIC SAPHENOUS VEIN HARVEST (N/A)  TRANSESOPHAGEAL ECHOCARDIOGRAM (N/A)  2 Days Post-Op  Reason for Referral: Generalized Muscle Weakness (M62.81)  Difficulty in walking, Not elsewhere classified (R26.2)  Inpatient: Payor: [employer] MEDICARE / Plan: [employer] GOLD PLUS HMO / Product Type: *No Product type* /     ASSESSMENT:     REHAB RECOMMENDATIONS:   Recommendation to date pending progress:  Setting:  Home Health Therapy    Equipment:    To Be Determined-has SPC RW 
Addendum- CXR significant L PTX, leave CT, pulm toilet  
Around 2100, patient started going in and out of afib, w HR up to 130s. /66. Diltiazem bolus and drip ordered per CV surgery protocol. Care ongoing.   
CTS- H&P current.  
[facility] updated on pt status and wife wanting him in a private room with her staying, if necessary. Orders received  
Cardiac Rehab: Spoke with patient regarding referral to cardiac rehab. Patient meets admission criteria based on CABGx3(6/11/25).  Written information about Cardiac Rehab given and reviewed with patient. Discussed lifestyle modifications to promote cardiac wellness. Patient indicated that he wants to participate in the cardiac rehab program and his orientation appt has been scheduled for the [facility] program. His Cardiologist is Dr. Ramos.      Thank you,  Maggie Ambriz, RN, BSN  Cardiopulmonary Rehabilitation Nurse Liaison  [facility]    
Discharge instructions, follow up appointments and prescriptions reviewed with patient and family. Both verbalize understanding. All personal belongings taken with patient. Family member will drive patient home. Patient escorted to discharge area via wheelchair. Patient is stable at discharge.  PIV and monitor and CT sutures removed.     
GOALS:  LTG: Patient will maintain adequate hydration/nutrition with optimum safety and efficiency of swallowing function with PO intake without overt signs or symptoms of aspiration for the highest appropriate diet level.  STG:  Patient will consume minced and moist textures and mildly thick liquids (nectar) without overt signs or symptoms of airway compromise.  Patient will perform small bites and sips, no straws, slow rate of PO intake, and single sips of liquids to improve swallow safety with minimal cues 80% of the time.  Patient will safely ingest diet trials during therapeutic feedings with SLP without overt signs or symptoms of respiratory compromise in efforts to advance diet.  Patient will complete a diagnostic instrumental assessment of dysphagia to fully assess physiology and anatomy of the swallow and determine safest appropriate diet and/or rehabilitation strategies, as medically indicated.    LTG: Patient will improve neuro-linguistic abilities to increase safety and awareness in functional living environment.   STG:  Patient will participate in full cognitive linguistic evaluation to determine current level of function.     SPEECH LANGUAGE PATHOLOGY: DYSPHAGIA Initial Assessment and Daily Note #2    Acknowledge Order  I  Therapy Time  I   Charges     I  Rehab Caseload Tracker      NAME: Humphrey Villalpando  : 1945  MRN: [id number]    ADMISSION DATE: 2025  PRIMARY DIAGNOSIS: S/P CABG x 3    ICD-10: Treatment Diagnosis: R13.12 Dysphagia, Oropharyngeal Phase    RECOMMENDATIONS   Diet:    Minced and Moist  Mildly thick liquid (nectar)   Thin Liquids upon request with upright positioning and small, single sips    Medication: whole floated in puree or applesauce   Compensatory Swallowing Strategies:   Slow rate of intake  No straws  Remain upright for 30-45 minutes after meals  Small bites/sips  Upright as possible for all oral intake  Set up assist  Single sips- no guzzling    Therapeutic 
GOALS:  LTG: Patient will maintain adequate hydration/nutrition with optimum safety and efficiency of swallowing function with PO intake without overt signs or symptoms of aspiration for the highest appropriate diet level.  STG:  Patient will consume minced and moist textures and mildly thick liquids (nectar) without overt signs or symptoms of airway compromise.  Patient will perform small bites and sips, no straws, slow rate of PO intake, and single sips of liquids to improve swallow safety with minimal cues 80% of the time.  Patient will safely ingest diet trials during therapeutic feedings with SLP without overt signs or symptoms of respiratory compromise in efforts to advance diet.  Patient will complete a diagnostic instrumental assessment of dysphagia to fully assess physiology and anatomy of the swallow and determine safest appropriate diet and/or rehabilitation strategies, as medically indicated.    LTG: Patient will improve neuro-linguistic abilities to increase safety and awareness in functional living environment.   STG:  Patient will participate in full cognitive linguistic evaluation to determine current level of function.     SPEECH LANGUAGE PATHOLOGY: DYSPHAGIA Initial Assessment and Daily Note #3    Acknowledge Order  I  Therapy Time  I   Charges     I  Rehab Caseload Tracker      NAME: Humphrey Villalpando  : 1945  MRN: [id number]    ADMISSION DATE: 2025  PRIMARY DIAGNOSIS: S/P CABG x 3    ICD-10: Treatment Diagnosis: R13.12 Dysphagia, Oropharyngeal Phase    RECOMMENDATIONS   Diet:    Soft and Bite-Sized  Mildly thick liquid (nectar)   Thin Liquids upon request with upright positioning and small, single sips    Medication: whole floated in puree or applesauce   Compensatory Swallowing Strategies:   Slow rate of intake  No straws  Remain upright for 30-45 minutes after meals  Small bites/sips  Upright as possible for all oral intake  Set up assist  Single sips- no guzzling    Therapeutic 
GOALS:  LTG: Patient will maintain adequate hydration/nutrition with optimum safety and efficiency of swallowing function with PO intake without overt signs or symptoms of aspiration for the highest appropriate diet level.  STG:  Patient will consume minced and moist textures and thin liquids without overt signs or symptoms of airway compromise.  Patient will perform small bites and sips, slow rate of PO intake, and single sips of liquids to improve swallow safety with minimal cues 80% of the time.  Patient will safely ingest diet trials during therapeutic feedings with SLP without overt signs or symptoms of respiratory compromise in efforts to advance diet.  Patient will complete a diagnostic instrumental assessment of dysphagia to fully assess physiology and anatomy of the swallow and determine safest appropriate diet and/or rehabilitation strategies, as medically indicated.    LTG: Patient will improve neuro-linguistic abilities to increase safety and awareness in functional living environment.   STG:  Patient will participate in full cognitive linguistic evaluation to determine current level of function.     SPEECH LANGUAGE PATHOLOGY: DYSPHAGIA Initial Assessment and Daily Note #1    Acknowledge Order  I  Therapy Time  I   Charges     I  Rehab Caseload Tracker      NAME: Humphrey Villalpando  : 1945  MRN: [id number]    ADMISSION DATE: 2025  PRIMARY DIAGNOSIS: S/P CABG x 3    ICD-10: Treatment Diagnosis: R13.12 Dysphagia, Oropharyngeal Phase    RECOMMENDATIONS   Diet:    Minced and Moist  Thin Liquids    Medication: whole floated in puree or applesauce   Compensatory Swallowing Strategies:   Slow rate of intake  Remain upright for 30-45 minutes after meals  Small bites/sips  Upright as possible for all oral intake  Set up assist  Single sips- no guzzling    Therapeutic Intervention:   Patient/family education  Dysphagia treatment   Patient continues to require skilled intervention:  Yes. Recommend 
[facility] Progress Note    Subjective:      - 2.6 L urine  - Creatinine down to 1.69.  Baseline unclear but looks to be there or may be slightly below  - More alert and awake.  Without overt physical therapy      Objective:    Current Facility-Administered Medications   Medication Dose Route Frequency    acetaminophen (TYLENOL) tablet 1,000 mg  1,000 mg Oral Q6H    furosemide (LASIX) injection 40 mg  40 mg IntraVENous BID    traMADol (ULTRAM) tablet 50 mg  50 mg Oral Q6H PRN    gabapentin (NEURONTIN) capsule 300 mg  300 mg Oral TID    tamsulosin (FLOMAX) capsule 0.4 mg  0.4 mg Oral Daily    sodium chloride flush 0.9 % injection 5-40 mL  5-40 mL IntraVENous 2 times per day    sodium chloride flush 0.9 % injection 5-40 mL  5-40 mL IntraVENous PRN    ondansetron (ZOFRAN-ODT) disintegrating tablet 4 mg  4 mg Oral Q8H PRN    Or    ondansetron (ZOFRAN) injection 4 mg  4 mg IntraVENous Q6H PRN    polyethylene glycol (GLYCOLAX) packet 17 g  17 g Oral Daily    senna (SENOKOT) tablet 8.6 mg  1 tablet Oral BID PRN    magnesium hydroxide (MILK OF MAGNESIA) 400 MG/5ML suspension 30 mL  30 mL Oral Daily PRN    famotidine (PEPCID) tablet 20 mg  20 mg Oral BID    Or    famotidine (PEPCID) 20 MG/2ML 20 mg in sodium chloride (PF) 0.9 % 10 mL injection  20 mg IntraVENous BID    insulin lispro (HUMALOG,ADMELOG) injection vial 0-12 Units  0-12 Units SubCUTAneous TID WC    insulin lispro (HUMALOG,ADMELOG) injection vial 0-6 Units  0-6 Units SubCUTAneous Nightly    glucose chewable tablet 16 g  4 tablet Oral PRN    dextrose bolus 10% 125 mL  125 mL IntraVENous PRN    Or    dextrose bolus 10% 250 mL  250 mL IntraVENous PRN    dextrose 10 % infusion   IntraVENous Continuous PRN    magnesium oxide (MAG-OX) tablet 400 mg  400 mg Oral TID PRN    magnesium oxide (MAG-OX) tablet 400 mg  400 mg Oral 4x Daily PRN    potassium chloride (KLOR-CON M) extended release tablet 20 mEq  20 mEq Oral BID PRN    potassium chloride (KLOR-CON M) 
[facility] Progress Note    Subjective:      Transferred back to CVICU secondary to rhythm issues concern for possible aspiration pneumonia.  - Currently awake alert  - Heart rate has been stable on amiodarone.  - Diuretics on hold but still making urine      Objective:    Current Facility-Administered Medications   Medication Dose Route Frequency    sodium chloride (Inhalant) 3 % nebulizer solution 4 mL  4 mL Nebulization BID    arformoterol 15 mcg-budesonide 0.25 mg neb solution   Nebulization BID RT    famotidine (PEPCID) tablet 20 mg  20 mg Oral Daily    Or    famotidine (PEPCID) 20 MG/2ML 20 mg in sodium chloride (PF) 0.9 % 10 mL injection  20 mg IntraVENous Daily    acetaminophen (TYLENOL) tablet 1,000 mg  1,000 mg Oral Q6H    furosemide (LASIX) tablet 40 mg  40 mg Oral BID    guaiFENesin (MUCINEX) extended release tablet 1,200 mg  1,200 mg Oral BID    amiodarone (CORDARONE) tablet 200 mg  200 mg Oral BID    piperacillin-tazobactam (ZOSYN) 3,375 mg in sodium chloride 0.9 % 50 mL IVPB (addEASE)  3,375 mg IntraVENous Q8H    vancomycin (VANCOCIN) intermittent dosing (placeholder)   Other RX Placeholder    traMADol (ULTRAM) tablet 50 mg  50 mg Oral Q6H PRN    gabapentin (NEURONTIN) capsule 300 mg  300 mg Oral TID    tamsulosin (FLOMAX) capsule 0.4 mg  0.4 mg Oral Daily    sodium chloride flush 0.9 % injection 5-40 mL  5-40 mL IntraVENous 2 times per day    sodium chloride flush 0.9 % injection 5-40 mL  5-40 mL IntraVENous PRN    ondansetron (ZOFRAN-ODT) disintegrating tablet 4 mg  4 mg Oral Q8H PRN    Or    ondansetron (ZOFRAN) injection 4 mg  4 mg IntraVENous Q6H PRN    polyethylene glycol (GLYCOLAX) packet 17 g  17 g Oral Daily    senna (SENOKOT) tablet 8.6 mg  1 tablet Oral BID PRN    magnesium hydroxide (MILK OF MAGNESIA) 400 MG/5ML suspension 30 mL  30 mL Oral Daily PRN    insulin lispro (HUMALOG,ADMELOG) injection vial 0-12 Units  0-12 Units SubCUTAneous TID WC    insulin lispro (HUMALOG,ADMELOG) 
[facility] Progress Note    Subjective:      Transferred back to CVICU secondary to rhythm issues concern for possible aspiration pneumonia.  Talking well.  Wife present bedside      Objective:    Current Facility-Administered Medications   Medication Dose Route Frequency    cefTRIAXone (ROCEPHIN) 2,000 mg in sodium chloride 0.9 % 50 mL IVPB (addEASE)  2,000 mg IntraVENous Q24H    amiodarone (CORDARONE) tablet 400 mg  400 mg Oral BID    sodium chloride flush 0.9 % injection 5-40 mL  5-40 mL IntraVENous PRN    ondansetron (ZOFRAN-ODT) disintegrating tablet 4 mg  4 mg Oral Q8H PRN    Or    ondansetron (ZOFRAN) injection 4 mg  4 mg IntraVENous Q6H PRN    sennosides-docusate sodium (SENOKOT-S) 8.6-50 MG tablet 1 tablet  1 tablet Oral BID    magnesium hydroxide (MILK OF MAGNESIA) 400 MG/5ML suspension 30 mL  30 mL Oral Daily PRN    insulin lispro (HUMALOG,ADMELOG) injection vial 0-12 Units  0-12 Units SubCUTAneous TID WC    insulin lispro (HUMALOG,ADMELOG) injection vial 0-6 Units  0-6 Units SubCUTAneous Nightly    glucose chewable tablet 16 g  4 tablet Oral PRN    dextrose bolus 10% 125 mL  125 mL IntraVENous PRN    Or    dextrose bolus 10% 250 mL  250 mL IntraVENous PRN    dextrose 10 % infusion   IntraVENous Continuous PRN    sodium chloride (Inhalant) 3 % nebulizer solution 4 mL  4 mL Nebulization BID    arformoterol 15 mcg-budesonide 0.25 mg neb solution   Nebulization BID RT    famotidine (PEPCID) tablet 20 mg  20 mg Oral Daily    Or    famotidine (PEPCID) 20 MG/2ML 20 mg in sodium chloride (PF) 0.9 % 10 mL injection  20 mg IntraVENous Daily    polyethylene glycol (GLYCOLAX) packet 17 g  17 g Oral Daily PRN    acetaminophen (TYLENOL) tablet 1,000 mg  1,000 mg Oral Q6H    furosemide (LASIX) tablet 40 mg  40 mg Oral BID    guaiFENesin (MUCINEX) extended release tablet 1,200 mg  1,200 mg Oral BID    traMADol (ULTRAM) tablet 50 mg  50 mg Oral Q6H PRN    gabapentin (NEURONTIN) capsule 300 mg  300 mg Oral TID    
POD 1 Day Post-Op    Procedure:  Procedure(s):  CORONARY ARTERY BYPASS GRAFTING (CABG X3) LIMA, LEFT LEG ENDOSCOPIC SAPHENOUS VEIN HARVEST  TRANSESOPHAGEAL ECHOCARDIOGRAM      Subjective:     Patient has No significant medical complaints      Objective:     Patient Vitals for the past 8 hrs:   BP Temp Temp src Pulse Resp SpO2 Weight   25 0630 -- -- -- 75 20 93 % --   25 0615 -- -- -- 75 21 93 % --   25 0601 -- -- -- 76 29 91 % --   25 0600 92/66 -- -- 77 30 92 % 96.6 kg (212 lb 15.4 oz)   25 0545 -- -- -- 79 26 93 % --   25 0530 -- -- -- 72 24 96 % --   25 0515 -- -- -- 74 27 93 % --   25 0500 106/67 -- -- 81 28 96 % --   25 0445 -- -- -- 68 28 95 % --   25 0430 -- -- -- 70 17 95 % --   25 0415 -- -- -- 73 18 95 % --   25 0400 106/63 99.1 °F (37.3 °C) Bladder 68 22 96 % --   25 0345 -- -- -- 68 30 95 % --   25 0330 -- -- -- 68 21 97 % --   25 0315 -- -- -- 69 (!) 36 97 % --   25 0300 100/70 -- -- 82 20 96 % --   25 0254 -- -- -- -- 17 -- --   25 0245 -- -- -- 76 18 96 % --   25 0230 -- -- -- 78 20 94 % --   25 0215 -- -- -- 72 24 95 % --   25 0200 106/66 -- -- 75 (!) 32 95 % --   25 0145 -- -- -- 76 (!) 31 95 % --   25 0130 -- -- -- 74 22 96 % --   25 0115 (!) 96/57 -- -- 75 (!) 34 95 % --   25 0100 -- -- -- 75 21 96 % --   25 0045 -- -- -- 75 19 96 % --   25 0030 -- -- -- 75 (!) 36 96 % --   25 0015 -- -- -- 76 21 96 % --   25 0000 109/63 100.1 °F (37.8 °C) Bladder 76 20 96 % --     Temp (24hrs), Av.8 °F (37.1 °C), Min:96.9 °F (36.1 °C), Max:100.1 °F (37.8 °C)        Hemodynamics    PAP    CO    CI    No intake/output data recorded.  06/10 1901 -  0700  In: 2350.7 [I.V.:1900.7]  Out: 1324 [Urine:1210]    CT Drainage              total of all CT's    Heart:  regular rate and rhythm, S1, S2 normal, no murmur, click, rub or gallop  Lung:  
POD 2 Days Post-Op    Procedure:  Procedure(s):  CORONARY ARTERY BYPASS GRAFTING (CABG X3) LIMA, LEFT LEG ENDOSCOPIC SAPHENOUS VEIN HARVEST  TRANSESOPHAGEAL ECHOCARDIOGRAM      Subjective:     Patient has No significant medical complaints      Objective:     Patient Vitals for the past 8 hrs:   BP Temp Temp src Pulse Resp SpO2 Weight   25 0630 (!) 147/76 -- -- 64 (!) 33 94 % --   25 0600 126/70 -- -- 59 24 93 % --   25 0545 (!) 144/74 -- -- 65 28 92 % --   25 0500 113/70 -- -- 69 23 92 % --   25 0430 127/81 -- -- 69 24 93 % --   25 0400 134/77 -- -- 69 (!) 31 94 % --   25 0330 132/81 -- -- 69 28 (!) 80 % --   25 0300 116/70 97.6 °F (36.4 °C) Axillary 69 30 93 % --   25 0230 117/71 -- -- 69 (!) 48 91 % --   25 0200 123/74 -- -- 69 27 (!) 89 % 93.8 kg (206 lb 12.7 oz)   25 0130 136/83 -- -- 69 24 93 % --   25 0100 108/71 -- -- 70 26 95 % --   25 0030 108/71 -- -- 69 24 94 % --   25 0000 96/66 -- -- 69 20 95 % --     Temp (24hrs), Av °F (36.7 °C), Min:97.4 °F (36.3 °C), Max:98.7 °F (37.1 °C)        Hemodynamics    PAP    CO    CI    No intake/output data recorded.   190 -  0700  In: 887.4 [I.V.:887.4]  Out: 184 [Urine:1515]    CT Drainage              total of all CT's    Heart:  regular rate and rhythm, S1, S2 normal, no murmur, click, rub or gallop  Lung:  clear to auscultation bilaterally  Neuro: Grossly non focal  Incisions: Clean, dry, and intact    Labs:  Recent Results (from the past 24 hours)   POCT Glucose    Collection Time: 25 11:28 AM   Result Value Ref Range    POC Glucose 138 (H) 65 - 100 mg/dL    Performed by: Sally    POCT Glucose    Collection Time: 25  4:30 PM   Result Value Ref Range    POC Glucose 161 (H) 65 - 100 mg/dL    Performed by: Sally    POCT Glucose    Collection Time: 25  7:31 PM   Result Value Ref Range    POC Glucose 239 (H) 65 - 100 mg/dL    Performed 
PW removed by Candis VENTURA. Planned for discharge today but will be this afternoon.   
Patient converted back into afib while ambulating. Spoke with Dr Yin and received order to increase patient's amiodarone to 400 mg PO BID.   
Patient had episode of orthostatic hypotension on the floor.  Patient was transferred out of CVICU yesterday.  He is status post CABG x 3 3 days postop.  Patient given 2 boluses of albumin 250 and brought to the CVICU for intensive care.  Patient appears to have possible aspiration pneumonia on imaging. Start Zosyn and request pharmacy consultation for vancomycin until blood cultures sputum and urine cultures can be finalized.  Formal pulmonary evaluation in the AM.  
Patient sitting up in chair and finished his dinner. Medicated patient with insulin and mucinex. Patient stood up and as he stood he became unsteady. Assisted patient back to bed. Lasix dose due and his blood pressure was high 80s/40s. Rechecked after 5 minutes thinking orthostatic hypotension. Held Lasix dose Blood pressure was 91/47. Spoke with Dr. Yin at length and received order for 25 g Albumin STAT. Received order to give half the dose and inform him of patient's blood pressure stating if it goes below 100 systolic we would transfer patient back to ICU. After 250 ml infused, patient's blood pressure came up to 102/68. Received telephone order to infuse remaining Albumin but still transfer patient back to CVICU. Obtain CT of chest in transit. New IV placed in left forearm however Vascular access team notified in order to get a larger bore access.   
Physical Therapy Note:    Attempted to see patient this AM for physical therapy evaluation session. Hold per RN this morning, requesting to check in later today. PM update: RN requesting to continue to hold due to pneumothorax.   Will follow and re-attempt as schedule permits/patient available. Thank you,    VANDANA BULLARD, PT     Rehab Caseload Tracker    
Placed 22g 1.75in PIV in right forearm with ultrasound assistance.      
Received order to pull remaining chest tube.   Discussed procedure with patient and wife.   Chest tube pulled without complaint.    
Received verbal order to remove pleural chest tube. Mediastinal tube remains. Plan to remove remaining tube after chest x ray results this afternoon.   
Respiratory Mechanics completed and are as follows:  NIF - 20,, VC >1.4L     Patient extubated to 50 lpm and 50% HHFNC. Patient is able to communicate and is negative for stridor. Breath sounds are clear. No complications with extubation.     SHANNON GREGORIO, JUAN F  
SPIRITUAL HEALTH   Note for Initial Spiritual Assessment                   Room # MOR/PL    Name: Humphrey Villalpando           Age: 79 y.o.    Gender: male          MRN: [id number]  Religious: Presbyterian       Preferred Language: English    Date: 06/11/25  Visit Time: Begin Time: 0703 End Time : 0724 Complexity of Encounter: High      Visit Summary: Spiritual Consult for Pre-surgery Prayer.  responded to referral. Patient was engaged in the visit. Spouse of over 60 years was at the bedside. Patient shared about surgery including concerns and hopes. Patient and Spouse expressed affection and concern for one another.  provided calming presence, active listening, and spiritual and emotional support. Patient expressed trust in Alberto and comfort in aruna and prayer. Patient is Presbyterian.  offered prayer.  offered support and is available by referral for emergent needs.    Referral/Consult From: Nurse, Patient  Encounter Overview/Reason: Pre-Op  Service Provided For: Patient and family together     Patient was available.    Aruna, Belief, Meaning:   Patient identifies as spiritual  is connected with a aruna tradition or spiritual practice  has beliefs or practices that help with coping during difficult times  Family/Friends identifies as spiritual  are connected with a aruna tradition or spiritual practice  have beliefs or practices that help with coping during difficult times  Rituals (if applicable)      Importance and Influence:  Patient has spiritual/personal beliefs that influence decisions regarding their health  Family/Friends has spiritual/personal beliefs that influence decisions regarding the patient's health    Community:  Patient   indicated that they feel well-supported  Support System Includes   Spouse   Family/Friends   indicated that they feel well-supported  Support System Includes   Spouse     Assessment and Plan of Care:   Emotions Expressed by Patient:   Assessment: 
TRANSFER - IN REPORT:    Verbal report received from Birdie GUSTAFSON on Humphrey Villalpando  being received from CVICU for routine progression of patient care      Report consisted of patient's Situation, Background, Assessment and   Recommendations(SBAR).     Information from the following report(s) Adult Overview, Intake/Output, MAR, Recent Results, and Med Rec Status was reviewed with the receiving nurse.    Opportunity for questions and clarification was provided.          
TRANSFER - IN REPORT:    Verbal report received from CVOR team (name) on Humphrey Villalpando  being received from CVOR (unit) for routine post-op      Report consisted of patient’s Situation, Background, Assessment and   Recommendations(SBAR).     Information from the following report(s) SBAR, Kardex, OR Summary, Intake/Output, MAR, and Med Rec Status was reviewed with the receiving nurse.      Per anesthesia on admission patient intubation glidescope    Collaborative team agrees of surgeon, anesthesia, RT, and RN agrees to proceed with CV weaning protocol      Opportunity for questions and clarification was provided.      Assessment completed upon patient’s arrival to unit and care assumed by emmanuel GUSTAFSON.    
TRANSFER - OUT REPORT:    Verbal report given to JANAY Bear on Humphrey Vlilalpando  being transferred to  stepdown for routine progression of patient care       Report consisted of patient’s Situation, Background, Assessment and Recommendations(SBAR).     Information from the following report(s) SBAR and MAR was reviewed with the receiving nurse.    Opportunity for questions and clarification was provided.      
TRANSFER - OUT REPORT:    [facility]  being transferred to JANAY Bear for routine progression of patient care       Report consisted of patient's Situation, Background, Assessment and   Recommendations(SBAR).     Information from the following report(s) Adult Overview, Intake/Output, MAR, Recent Results, and Cardiac Rhythm SR w PAC, Afib was reviewed with the receiving nurse.           Lines:   Peripheral IV 06/11/25 Posterior;Right Hand (Active)   Site Assessment Clean, dry & intact 06/15/25 1100   Line Status Flushed;Normal saline locked;Capped 06/15/25 1100   Line Care Connections checked and tightened;Ports disinfected 06/15/25 1100   Phlebitis Assessment No symptoms 06/15/25 1100   Infiltration Assessment 0 06/15/25 1100   Alcohol Cap Used Yes 06/15/25 1100   Dressing Status Clean, dry & intact 06/15/25 1100   Dressing Type Transparent 06/15/25 1100       Peripheral IV 06/14/25 Distal;Left;Posterior Forearm (Active)   Site Assessment Clean, dry & intact 06/15/25 1100   Line Status Flushed;Normal saline locked;Capped 06/15/25 1100   Line Care Connections checked and tightened;Ports disinfected 06/15/25 1100   Phlebitis Assessment No symptoms 06/15/25 1100   Infiltration Assessment 0 06/15/25 1100   Alcohol Cap Used Yes 06/15/25 1100   Dressing Status Clean, dry & intact 06/15/25 1100   Dressing Type Transparent 06/15/25 1100   Dressing Intervention New 06/14/25 1640       Pacing Wires (Active)   Pacing Wire Status Ventricular wires isolated & capped 06/15/25 1100   Site Assessment Clean, dry & intact 06/15/25 1100   Pacer Wire Care Completed Yes 06/15/25 1100   Dressing Status Clean, dry & intact 06/15/25 1100   Dressing Intervention New;Dressing changed 06/15/25 1100        Opportunity for questions and clarification was provided.      Patient transported with:  Monitor, O2 @ 4lpm, and Tech       
Upon shift start, patient agitated/restless/confused, oriented only to self, attempted to pull at lines and climb out of bed despite multiple redirection and reorientation attempts by 2 RN's. While attempting medication pass, patient became belligerent, yelling for RN \"not put that in my mouth\", RN attempted to redirect patient and educate on the importance of taking routine post CABG medications, patient disagreeable. Will continue to monitor.   
VANCO DAILY FOLLOW UP NOTE  [facility]   Pharmacy Pharmacokinetic Monitoring Service - Vancomycin    Consulting Provider: Dr. Yin   Indication: aspiration pneumonia  Target Concentration: Goal AUC/LEANA 400-600 mg*hr/L  Day of Therapy: 1  Additional Antimicrobials: piperacillin-tazobactam    Pertinent Laboratory Values:   Wt Readings from Last 1 Encounters:   06/15/25 96.2 kg (212 lb)     Temp Readings from Last 1 Encounters:   06/15/25 97.8 °F (36.6 °C) (Oral)     Recent Labs     06/13/25  0253 06/14/25  0342 06/14/25  1842 06/14/25  1843 06/15/25  0338 06/15/25  0924   BUN 42* 42*  --  46* 43*  --    CREATININE 1.84* 1.69*  --  2.06* 1.86*  --    WBC 14.3* 14.0*  --  12.9*  --   --    LACTA  --   --  2.6*  --   --  1.3     Estimated Creatinine Clearance: 36 mL/min (A) (based on SCr of 1.86 mg/dL (H)).    Lab Results   Component Value Date/Time    VANCORANDOM 14.6 06/15/2025 09:24 AM       MRSA Nasal Swab: ordered    Assessment:  Date/Time Dose Concentration AUC         Note: Serum concentrations collected for AUC dosing may appear elevated if collected in close proximity to the dose administered, this is not necessarily an indication of toxicity    Plan:  Dosing recommendations based on Bayesian software  Start vancomycin 1000 mg IV every 24 hours  Anticipated AUC of 499 and trough concentration of 15.9 at steady state  Renal labs as indicated   Vancomycin concentrations will be ordered as clinically appropriate  Pharmacy will continue to monitor patient and adjust therapy as indicated    Thank you for the consult,  Dante Ray [facility]   
VANCO NOTE RENAL INSUFFICIENCY PATIENT   [facility]   Pharmacy Pharmacokinetic Monitoring Service - Vancomycin    Consulting Provider: Humphrey   Indication: aspiration pneumonia  Target Concentration: Random level <= 20 mg/L  Day of Therapy: 1  Additional Antimicrobials: Zosyn    Pertinent Laboratory Values:   Wt Readings from Last 1 Encounters:   06/14/25 92 kg (202 lb 13.2 oz)     Temp Readings from Last 1 Encounters:   06/14/25 98.1 °F (36.7 °C) (Oral)     Recent Labs     06/13/25  0253 06/14/25  0342 06/14/25  1842 06/14/25  1843   BUN 42* 42*  --  46*   CREATININE 1.84* 1.69*  --  2.06*   WBC 14.3* 14.0*  --  12.9*   LACTA  --   --  2.6*  --        No results found for: \"VANCOTROUGH\", \"VANCORANDOM\"    MRSA Nasal Swab: Was ordered by provider, awaiting results    Assessment:  Date:  Dose/Freq Admin Times Level/Time:   6/14 2000 mg x1 (8902)                              Plan:  Concentration-guided dosing due to renal impairment  Start vancomycin 2000 mg x1, then intermittently dose per random levels   Vancomycin concentrations will be ordered as clinically appropriate  Pharmacy will continue to monitor patient and adjust therapy as indicated    Thank you for the consult,  Annelise Valera, PharmD, BCPS       
  Anticipated Discharge Needs: Do not anticipate ongoing speech therapy needs upon discharge.      ASSESSMENT    Patient seen with thin liquids and regular solids at bedside this date. Functional oral prep, timely swallow initiation, and no overt indications of airway compromise when consuming thin liquids in single and serial sips, as well as course crackers with and without peanut butter. Patient reports wife is bringing in [employer] for lunch this date.     Recommending regular solids, thin liquids. Medications as tolerated.     GENERAL    Subjective: Sitting upright in chair, agreeable to skilled speech therapy.     Reason for Consult:  Dysphagia s/p extubation     History of Present Injury/Illness: Mr. Villalpando  has a past medical history of CAD (coronary artery disease), Chronic airway obstruction, not elsewhere classified, Erectile dysfunction, Essential hypertension, benign, H/O echocardiogram, History of colonic polyps, Inflammatory and toxic neuropathy, unspecified, Kidney disease, Kidney stone, Neoplasm of uncertain behavior of skin, Neuralgia, neuritis, and radiculitis, unspecified, Neuropathy of both feet, Osteoarthritis, Other and unspecified hyperlipidemia, Paralysis of diaphragm, PONV (postoperative nausea and vomiting), Prolonged emergence from general anesthesia, Retention of urine, Type II or unspecified type diabetes mellitus without mention of complication, uncontrolled, Ulcer of ankle (HCC), and Urinary incontinence.. He also  has a past surgical history that includes Colonoscopy (2007); Total shoulder arthroplasty (Right, 2007); hernia repair (Left, 07/12/2022); joint replacement; Cardiac procedure (N/A, 05/08/2025); Cardiac procedure (N/A, 05/08/2025); Coronary artery bypass graft (N/A, 6/11/2025); and transesophageal echocardiogram (N/A, 6/11/2025).  Precautions/Allergies: Codeine     Observations:  Alertness: Alert  Voice: adequate  Speech: Intelligible  Expressive Language: 
diuresis after single dose.  Got IV fluids over the weekend due to hypotension. EF up to 50 to 55% per recent echo.        Hypoxemia  Plan: Now on room air. CXR 6/16 with atx.  Had CT over the weekend which showed elevated right diaphragm and suspected RLL bronchiectasis. Abx added for suspected pna with elevated WBC, hypotension.  Sputum cx with GNRs.  Blood cultures negative. WBC decreasing. PCT today 0.12.  Fluid balance neg 1.8 liters over past 24 hours with single dose lasix.     --continue nebs (Atrovent, 3%) - resume home Symbicort/Albuterol at DC   --Abx day 3 - changed from Zosyn to Rocephin yesterday. Sputum cx with + GNRs - cx pending.  MRSA swab negative, so Vanc stopped 6/16. Check results sputum cx and adjust abx if needed - plan 5 day course.   --scheduled Lasix, hypotension resolved, creat down yesterday - renal following        More than 50% of the time documented was spent in face-to-face contact with the patient and in the care of the patient on the floor/unit where the patient is located.    In this split/shared evaluation I performed performed a medically appropriate history and exam, counseled and educated the patient and/or family member, ordered medications, tests or procedures, documented information in EMR, and coordinated care. which accounted for 16 minutes of clinical time.     JONATHAN Villatoro - CNP    ATTENDING ADDENDUM:    In this split/shared evaluation I performed reviewed the patients's H&P, available images, labs, cultures., discussed case in detail with NPP, performed a medically appropriate history and exam, counseled and educated the patient and/or family member, ordered and/or reviewed medications, tests or procedures, documented information in EMR, independently interpreted images, and coordinated care. which accounted for 20 minutes clinical time.     Impression: 79 y.o. y/o male s/p CV surgery for S/P CABG x 3. Smoking up until surgery. PFTs with FEV1 1.37 (45%).

## 2025-06-18 NOTE — CARE COORDINATION
Care Transitions Note    Initial Call - Call within 2 business days of discharge: Yes    Attempted to reach patient for transitions of care follow up. Unable to reach patient.    Outreach Attempts:   Unsuccessful attempt at JAZMIN call    Patient: Humphrey Villalpando    Patient : 1945   MRN: [id number]    Reason for Admission: CABG  Discharge Date: 25  RURS: Readmission Risk Score: 17    Last Discharge Facility       Date Complaint Diagnosis Description Type Department Provider    25  S/P CABG x 3 ... Admission (Discharged) [id number] Bruce Skaggs MD            Was this an external facility discharge? No    Follow Up Appointment:   Patient has hospital follow up appointment scheduled within 14 days of discharge.    Future Appointments         Provider Specialty Dept Phone    2025 8:30 AM Ricki Ramos MD Cardiology [phone]    2025 2:00 PM Maggie Ambriz RN Rehabilitation [phone]    2025 2:40 PM Bruce Skaggs MD Cardiothoracic Surgery [phone]    2025 1:30 PM Ricki Ramos MD Cardiology [phone]    2025 2:00 PM (Arrive by 1:45 PM) Terry Esparza MD Orthopedic Surgery [phone]    2025 2:40 PM David Mitchell MD Family Medicine [phone]            Plan for follow-up on next business day.      Yinka Heath RN

## 2025-06-19 ENCOUNTER — CARE COORDINATION (OUTPATIENT)
Dept: CARE COORDINATION | Facility: CLINIC | Age: 80
End: 2025-06-19

## 2025-06-19 ENCOUNTER — RESULTS FOLLOW-UP (OUTPATIENT)
Dept: PULMONOLOGY | Age: 80
End: 2025-06-19

## 2025-06-19 DIAGNOSIS — I25.10 CAD, MULTIPLE VESSEL: Primary | Chronic | ICD-10-CM

## 2025-06-19 LAB
BACTERIA SPEC CULT: ABNORMAL
BACTERIA SPEC CULT: NORMAL
BACTERIA SPEC CULT: NORMAL
GRAM STN SPEC: ABNORMAL
SERVICE CMNT-IMP: ABNORMAL
SERVICE CMNT-IMP: NORMAL
SERVICE CMNT-IMP: NORMAL

## 2025-06-19 PROCEDURE — 1111F DSCHRG MED/CURRENT MED MERGE: CPT | Performed by: FAMILY MEDICINE

## 2025-06-19 NOTE — CARE COORDINATION
Care Transitions Note    Initial Call - Call within 2 business days of discharge: Yes    Patient Current Location:  Home: [address] [place] [id number]    Care Transition Nurse contacted the patient by telephone to perform post hospital discharge assessment, verified name and  as identifiers.  Provided introduction to self, and explanation of the Care Transition Nurse role.    Patient: Humphrey Villalpando    Patient : 1945   MRN: [id number]    Reason for Admission: CABG  Discharge Date: 25  RURS: Readmission Risk Score: 17      Last Discharge Facility       Date Complaint Diagnosis Description Type Department Provider    25  S/P CABG x 3 ... Admission (Discharged) [id number] Bruce Skaggs MD            Was this an external facility discharge? No    Additional needs identified to be addressed with provider   No needs identified             Method of communication with provider: none.    Patients top risk factors for readmission: medical condition-CABG    Interventions to address risk factors:   Review of patient management of conditions/medications: review recent IP admission, review medications and follow up appt, HH SOC    Care Summary Note: Patient home after recent CABG. Patient denied any new or worsening symptoms but endorses fatigue after recent CABG. Patient surgical incision noted to be clean, dry, intact, per patient without any redness, swelling, drainage. Patient able to obtain medications from d/c and taking as prescribed. Patient has HH SOC later today and was encouraged to call CTN for any acute needs/concerns. CTN to assist with PCP follow up.    Care Transition Nurse reviewed discharge instructions, medical action plan, and red flags with patient. The patient was given an opportunity to ask questions; all questions answered at this time.. The patient verbalized understanding.   Were discharge instructions available to patient? Yes.   Reviewed appropriate site

## 2025-06-26 ENCOUNTER — CARE COORDINATION (OUTPATIENT)
Dept: CARE COORDINATION | Facility: CLINIC | Age: 80
End: 2025-06-26

## 2025-06-26 NOTE — CARE COORDINATION
Care Transitions Note    Follow Up Call     Patient Current Location:  [place]: [place] [address]    N Care Coordinator contacted the spouse/partner  by telephone. Verified name and  as identifiers.    Additional needs identified to be addressed with provider   No needs identified                 Method of communication with provider: none.    Care Summary Note: Spoke with spouse states patient is doing fine. Spouse states patient denies chest pain , shortness of breath or heart racing. Spouse states physical therapy has been working with the patient to increase his strength and has left exercises to do. Spouse states patient is scheduled for Transitional Care with Cardiology on 2025. Will follow up with spouse next week for any acute needs.    Plan of care updates since last contact:  Review of patient management of conditions/medications: diet, hydration, medication compliance and follow up appointment.  Home Health: [facility] home health physical therapy.        Advance Care Planning:   Does patient have an Advance Directive: reviewed and current.    Medication Review:  No changes since last call.     Remote Patient Monitoring:  Offered patient enrollment in the Remote Patient Monitoring (RPM) program for in-home monitoring: Patient is not eligible for RPM program because: patient does not have qualifying diagnosis.    Assessments:   Goals Addressed                   This Visit's Progress     Returns to baseline activity level.                Follow Up Appointment:   Reviewed upcoming appointment(s).  Future Appointments         Provider Specialty Dept Phone    2025 8:30 AM Ricki Ramos MD Cardiology [phone]    7/3/2025 9:40 AM David Mitchell MD Family Medicine [phone]    2025 2:00 PM Maggie Ambriz RN Rehabilitation [phone]    2025 2:40 PM Bruce Skaggs MD Cardiothoracic Surgery [phone]    2025 1:30 PM Ricki Ramos

## 2025-06-28 NOTE — PROGRESS NOTES
[facility] CARDIOLOGY  [address] [place] [address], [address]  [place] [address]  PHONE: [phone]        NAME:  Humphrey Villalpando  : 1945  MRN: [id number]     PCP:  David Mitchell MD      SUBJECTIVE:   Humphrey Villalpando is a 79 y.o. male seen for a follow up visit regarding the following:     Chief Complaint   Patient presents with    Congestive Heart Failure       HPI: Transitional care 14-day follow-up from CABG admission, admit 2025, discharged 2025.  Transitional care phone call 2025.    No angina, arrhythmic symptoms or presyncope but recently presented and seen by my partner with volume overload symptoms.  Medications adjusted, diuresed and presents today clinically euvolemic but echocardiogram unfortunately showed progressive deterioration of ejection fraction.  High risk for occlusive coronary disease given age, risk factors, and continued tobacco use.    Echocardiogram 3/27/2025:    Left Ventricle: Moderately reduced left ventricular systolic function with a visually estimated EF of 35 - 40%. Moderate global hypokinesis present.  Technically difficult study.  Consider cardiac MRI for more accurate ejection fraction assessment.    Right Ventricle: Reduced systolic function.    Aortic Valve: Mild sclerosis of the aortic valve cusps.    Mitral Valve: Mild regurgitation.    Left heart catheterization 2025:    Ischemic cardiomyopathy with diabetes and tobacco use, EF 35 to 40% by ventriculogram    Critical mid RCA and proximal to mid circumflex disease with good distal targets    Eccentric calcified disease in the mid LAD with positive pressure wire interrogation    Cardiothoracic surgery was consulted to consider coronary artery bypass grafting but the patient is adamantly against having open heart surgery and presents today to discuss salvage PCI.    Carotid duplex 2024:    Mild (<50%) stenosis in the right internal carotid artery.    Mild (<50%) stenosis

## 2025-06-30 ENCOUNTER — OFFICE VISIT (OUTPATIENT)
Age: 80
End: 2025-06-30

## 2025-06-30 VITALS
BODY MASS INDEX: 31.1 KG/M2 | DIASTOLIC BLOOD PRESSURE: 66 MMHG | WEIGHT: 210 LBS | HEART RATE: 68 BPM | SYSTOLIC BLOOD PRESSURE: 112 MMHG | HEIGHT: 69 IN

## 2025-06-30 DIAGNOSIS — E78.2 MIXED HYPERLIPIDEMIA: ICD-10-CM

## 2025-06-30 DIAGNOSIS — Z95.1 S/P CABG X 3: ICD-10-CM

## 2025-06-30 DIAGNOSIS — I10 PRIMARY HYPERTENSION: ICD-10-CM

## 2025-06-30 DIAGNOSIS — Z72.0 TOBACCO ABUSE: Chronic | ICD-10-CM

## 2025-06-30 DIAGNOSIS — I25.10 CAD IN NATIVE ARTERY: ICD-10-CM

## 2025-06-30 DIAGNOSIS — I50.22 CHRONIC SYSTOLIC HEART FAILURE (HCC): Primary | Chronic | ICD-10-CM

## 2025-06-30 DIAGNOSIS — I65.23 CAROTID ATHEROSCLEROSIS, BILATERAL: ICD-10-CM

## 2025-07-02 ENCOUNTER — CARE COORDINATION (OUTPATIENT)
Dept: CARE COORDINATION | Facility: CLINIC | Age: 80
End: 2025-07-02

## 2025-07-02 NOTE — CARE COORDINATION
Care Transitions Note    Final Call     Patient Current Location:  Home: [address] [place] [id number]    N Care Coordinator contacted the spouse/partner  by telephone. Verified name and  as identifiers.    Patient graduated from the Care Transitions program on 2025.  Patient/family verbalizes confidence in the ability to self-manage at this time..      Advance Care Planning:   Does patient have an Advance Directive: reviewed and current.    Handoff:   Patient was not referred to the ACM team due to no additional needs identified.       Care Summary Note: Spoke with spouse states patient is doing fine. Spouse denies any acute distress during this phone call.  Spouse states patient attended Transitional Care appointment with Cardiology and spouse states doctor was satisfied with his progress. Spouse states appreciative of follow up calls.    Assessments:  Care Transitions Subsequent and Final Call    Subsequent and Final Calls  Do you have any ongoing symptoms?: No  Have your medications changed?: No  Do you have any questions related to your medications?: No  Do you currently have any active services?: No  Are you currently active with any services?: Home Health  Do you have any needs or concerns that I can assist you with?: No  Identified Barriers: None  Care Transitions Interventions  No Identified Needs  Disease Specific Clinic: Completed      Other Interventions:              Upcoming Appointments:    Future Appointments         Provider Specialty Dept Phone    7/3/2025 9:40 AM David Mitchell MD Family Medicine [phone]    2025 2:00 PM Maggie Ambriz, RN Rehabilitation [phone]    2025 2:40 PM Bruce Skaggs MD Cardiothoracic Surgery [phone]    2025 11:30 AM [facility] Cardiology [phone]    2025 1:30 PM Ricki Ramos MD Cardiology [phone]    2025 2:00 PM (Arrive by 1:45 PM) Terry Esparza MD Orthopedic

## 2025-07-03 ENCOUNTER — OFFICE VISIT (OUTPATIENT)
Dept: FAMILY MEDICINE CLINIC | Facility: CLINIC | Age: 80
End: 2025-07-03
Payer: MEDICARE

## 2025-07-03 VITALS
DIASTOLIC BLOOD PRESSURE: 62 MMHG | HEART RATE: 70 BPM | SYSTOLIC BLOOD PRESSURE: 99 MMHG | OXYGEN SATURATION: 92 % | HEIGHT: 69 IN | BODY MASS INDEX: 31.55 KG/M2 | WEIGHT: 213 LBS

## 2025-07-03 DIAGNOSIS — N18.32 CKD STAGE 3B, GFR 30-44 ML/MIN (HCC): ICD-10-CM

## 2025-07-03 DIAGNOSIS — E78.1 PURE HYPERGLYCERIDEMIA: ICD-10-CM

## 2025-07-03 DIAGNOSIS — I25.10 CORONARY ARTERY DISEASE INVOLVING NATIVE CORONARY ARTERY OF NATIVE HEART WITHOUT ANGINA PECTORIS: Primary | ICD-10-CM

## 2025-07-03 DIAGNOSIS — J44.9 CHRONIC OBSTRUCTIVE PULMONARY DISEASE, UNSPECIFIED COPD TYPE (HCC): Chronic | ICD-10-CM

## 2025-07-03 DIAGNOSIS — I10 PRIMARY HYPERTENSION: ICD-10-CM

## 2025-07-03 DIAGNOSIS — E11.42 TYPE 2 DIABETES MELLITUS WITH DIABETIC POLYNEUROPATHY, WITHOUT LONG-TERM CURRENT USE OF INSULIN (HCC): ICD-10-CM

## 2025-07-03 DIAGNOSIS — E78.00 PURE HYPERCHOLESTEROLEMIA: ICD-10-CM

## 2025-07-03 PROCEDURE — 3078F DIAST BP <80 MM HG: CPT | Performed by: FAMILY MEDICINE

## 2025-07-03 PROCEDURE — 3074F SYST BP LT 130 MM HG: CPT | Performed by: FAMILY MEDICINE

## 2025-07-03 PROCEDURE — 99214 OFFICE O/P EST MOD 30 MIN: CPT | Performed by: FAMILY MEDICINE

## 2025-07-03 PROCEDURE — 1036F TOBACCO NON-USER: CPT | Performed by: FAMILY MEDICINE

## 2025-07-03 PROCEDURE — G2211 COMPLEX E/M VISIT ADD ON: HCPCS | Performed by: FAMILY MEDICINE

## 2025-07-03 PROCEDURE — G8427 DOCREV CUR MEDS BY ELIG CLIN: HCPCS | Performed by: FAMILY MEDICINE

## 2025-07-03 PROCEDURE — 3023F SPIROM DOC REV: CPT | Performed by: FAMILY MEDICINE

## 2025-07-03 PROCEDURE — 1159F MED LIST DOCD IN RCRD: CPT | Performed by: FAMILY MEDICINE

## 2025-07-03 PROCEDURE — 1111F DSCHRG MED/CURRENT MED MERGE: CPT | Performed by: FAMILY MEDICINE

## 2025-07-03 PROCEDURE — G8417 CALC BMI ABV UP PARAM F/U: HCPCS | Performed by: FAMILY MEDICINE

## 2025-07-03 PROCEDURE — 3051F HG A1C>EQUAL 7.0%<8.0%: CPT | Performed by: FAMILY MEDICINE

## 2025-07-03 PROCEDURE — 1123F ACP DISCUSS/DSCN MKR DOCD: CPT | Performed by: FAMILY MEDICINE

## 2025-07-03 NOTE — PROGRESS NOTES
SUBJECTIVE:   Humphrey Villalpando is a 79 y.o. 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  Since I last seen the patient he has undergone a heart cath revealing severe calcified multivessel disease with decreased ejection fraction.  As a consequence he underwent CABG x 3 on June 11.  He was hospitalized and discharged on June 17.  He did develop some postoperative atrial fibrillation.  His discharge medications are listed in the EMR and reviewed today.  Patient reports that since discharge she has understandably had some soreness in his chest from his CABG.  He is however reporting no chest pain that is exertional nor any orthopnea or PND.  He reports no rapid heart rate.  He reports energy level is good.  He is moving his bowels normally and passing his urine normally.  His appetite has been good.    HPI  See above    Past Medical History, Past Surgical History, Family history, Social History, and Medications were all reviewed with the patient today and updated as necessary.       Current Outpatient Medications   Medication Sig Dispense Refill    aspirin 81 MG EC tablet Take 1 tablet by mouth daily      acetaminophen (TYLENOL) 500 MG tablet Take 2 tablets by mouth every 6 hours as needed for Pain      amiodarone (CORDARONE) 200 MG tablet Take 1 tablet by mouth 2 times daily for 14 days, THEN 1 tablet daily for 7 days. 35 tablet 0    apixaban (ELIQUIS) 2.5 MG TABS tablet Take 1 tablet by mouth 2 times daily 60 tablet 0    NONFORMULARY Take 1 capsule by mouth at bedtime Natrol sleep (otc) 1/2 tab at hs      empagliflozin (JARDIANCE) 10 MG tablet Take 1 tablet by mouth daily 30 tablet 5    budesonide-formoterol (SYMBICORT) 160-4.5 MCG/ACT AERO Inhale 2 puffs into the lungs 2 times daily Rinse mouth

## 2025-07-03 NOTE — PROGRESS NOTES
Have you been to the ER, urgent care clinic since your last visit?  Hospitalized since your last visit?   YES - When: approximately 1 months ago.  Where and Why: [facility] for Bypass surgery.    Have you seen or consulted any other health care providers outside our system since your last visit?   NO

## 2025-07-07 ENCOUNTER — HOSPITAL ENCOUNTER (OUTPATIENT)
Dept: CARDIAC REHAB | Age: 80
Setting detail: RECURRING SERIES
Discharge: HOME | End: 2025-07-10

## 2025-07-07 ASSESSMENT — PATIENT HEALTH QUESTIONNAIRE - PHQ9
8. MOVING OR SPEAKING SO SLOWLY THAT OTHER PEOPLE COULD HAVE NOTICED. OR THE OPPOSITE, BEING SO FIGETY OR RESTLESS THAT YOU HAVE BEEN MOVING AROUND A LOT MORE THAN USUAL: NOT AT ALL
SUM OF ALL RESPONSES TO PHQ QUESTIONS 1-9: 9
1. LITTLE INTEREST OR PLEASURE IN DOING THINGS: NEARLY EVERY DAY
SUM OF ALL RESPONSES TO PHQ QUESTIONS 1-9: 9
10. IF YOU CHECKED OFF ANY PROBLEMS, HOW DIFFICULT HAVE THESE PROBLEMS MADE IT FOR YOU TO DO YOUR WORK, TAKE CARE OF THINGS AT HOME, OR GET ALONG WITH OTHER PEOPLE: NOT DIFFICULT AT ALL
2. FEELING DOWN, DEPRESSED OR HOPELESS: NOT AT ALL
5. POOR APPETITE OR OVEREATING: SEVERAL DAYS
9. THOUGHTS THAT YOU WOULD BE BETTER OFF DEAD, OR OF HURTING YOURSELF: NOT AT ALL
3. TROUBLE FALLING OR STAYING ASLEEP: NOT AT ALL
SUM OF ALL RESPONSES TO PHQ QUESTIONS 1-9: 9
4. FEELING TIRED OR HAVING LITTLE ENERGY: NEARLY EVERY DAY
6. FEELING BAD ABOUT YOURSELF - OR THAT YOU ARE A FAILURE OR HAVE LET YOURSELF OR YOUR FAMILY DOWN: NOT AT ALL
SUM OF ALL RESPONSES TO PHQ QUESTIONS 1-9: 9
7. TROUBLE CONCENTRATING ON THINGS, SUCH AS READING THE NEWSPAPER OR WATCHING TELEVISION: MORE THAN HALF THE DAYS

## 2025-07-07 NOTE — PROGRESS NOTES
Dear Dr. Ramos,    Thank you for referring your patient, Mr. Humphrey Villalpando ( 1945), to the Cardiopulmonary Rehabilitation Program at [facility]. He is a good candidate for the Cardiac Rehab Program and should see improvements with regular participation.    We will be addressing appropriate interventions for modifiable risk factors with your patient during the next 12 weeks. We will contact you with any issues or concerns that may arise, or you can follow your patient's progress through [facility] at any time. A final summary will be sent to you when the program is completed.    Again, thank you for the referral. If we can be of further assistance, please feel free to contact the Cardiopulmonary Rehab staff at [phone].    Sincerely,    BEVERLY Shultz, RN  Cardiopulmonary Rehabilitation Nurse Liaison  [facility]

## 2025-07-08 ENCOUNTER — OFFICE VISIT (OUTPATIENT)
Dept: CARDIOTHORACIC SURGERY | Age: 80
End: 2025-07-08

## 2025-07-08 VITALS
WEIGHT: 209.6 LBS | DIASTOLIC BLOOD PRESSURE: 67 MMHG | BODY MASS INDEX: 31.04 KG/M2 | HEIGHT: 69 IN | SYSTOLIC BLOOD PRESSURE: 103 MMHG | OXYGEN SATURATION: 93 % | HEART RATE: 79 BPM

## 2025-07-08 DIAGNOSIS — Z95.1 S/P CABG X 3: Primary | ICD-10-CM

## 2025-07-08 PROCEDURE — 99024 POSTOP FOLLOW-UP VISIT: CPT | Performed by: PHYSICIAN ASSISTANT

## 2025-07-08 NOTE — PROGRESS NOTES
[facility]  [facility]  MD Bruce King MD          Humphrey Villalpando       [id number]  7/8/2025 1945      Humphrey Villalpando is seen today for follow-up status post CABG x 3 with LIMA to the LAD, reverse SVG to the OM and reverse SVG to the PDA on 6/11/25.   Chest xray the morning of 6/12 showed a left pneumothorax. Suction was increased. Repeat chest xray was improved. He developed a-fib but converted to sinus rhythm. He was transferred to CV stepdown. He developed hypotension on 6/14 and was transferred back to the CVICU. Echocardiogram showed a normal LV EF with no pericardial effusion. BP improved and he was transferred back to CV stepdown. Renal function remained stable. Oral lasix was resumed. He continued to have some intermittent a-fib with RVR. Low dose BB was resumed. He was discharged home on 6/17/25.    he is active and ambulatory.  There is no fever or shortness of breath.    Appetite is good.   Pain has improved.   Pt is sleeping well.    EXAM:  Vitals:  Blood pressure 103/67, pulse 79, height 1.753 m (5' 9\"), weight 95.1 kg (209 lb 9.6 oz), SpO2 93%.  Lungs:  Clear to auscultation bilaterally.  Heart: Regular rate and rhythm without murmurs.  Incision: Sternum stable. Incision healing well. Leg incisions healing well.     IMPRESSION and PLAN:  DC from CT surgery. Pt may drive. Pt is planning to begin cardiac rehab.     Sternal precautions: Pt advised to avoid any heavy lifting for another 4 weeks but can increase activity as tolerated.   Pt has cardiology follow-up with Dr. Ramos    No need to schedule follow-up at this point but the patient may call or return anytime if issues or questions arise.      Candis Lazaro PA-C  7/8/2025

## 2025-07-15 ENCOUNTER — HOSPITAL ENCOUNTER (OUTPATIENT)
Dept: CARDIAC REHAB | Age: 80
Setting detail: RECURRING SERIES
Discharge: HOME | End: 2025-07-18
Payer: MEDICARE

## 2025-07-15 PROCEDURE — 93798 PHYS/QHP OP CAR RHAB W/ECG: CPT

## 2025-07-17 ENCOUNTER — HOSPITAL ENCOUNTER (OUTPATIENT)
Dept: CARDIAC REHAB | Age: 80
Setting detail: RECURRING SERIES
Discharge: HOME | End: 2025-07-20
Payer: MEDICARE

## 2025-07-17 PROCEDURE — 93798 PHYS/QHP OP CAR RHAB W/ECG: CPT

## 2025-07-21 ENCOUNTER — HOSPITAL ENCOUNTER (OUTPATIENT)
Dept: CARDIAC REHAB | Age: 80
Setting detail: RECURRING SERIES
End: 2025-07-21
Payer: MEDICARE

## 2025-07-28 ENCOUNTER — TELEPHONE (OUTPATIENT)
Age: 80
End: 2025-07-28

## 2025-07-28 ENCOUNTER — HOSPITAL ENCOUNTER (OUTPATIENT)
Dept: CARDIAC REHAB | Age: 80
Setting detail: RECURRING SERIES
Discharge: HOME | End: 2025-07-31
Payer: MEDICARE

## 2025-07-28 PROCEDURE — 93798 PHYS/QHP OP CAR RHAB W/ECG: CPT

## 2025-07-28 NOTE — TELEPHONE ENCOUNTER
Stop Lasix.  Hold Toprol-XL completely for the next 48 hours and then start 12.5 mg at bedtime.  Needs to drink at least 50 to 60 ounces of nonalcoholic/noncaffeinated beverages daily.

## 2025-07-28 NOTE — TELEPHONE ENCOUNTER
Note below received from cardiac rehab:    7/28/2025      Pt here today for 3rd visit of cardiac rehab. Pt states he fell Saturday after doing yard work and felt like \"he got tripped up\" by the leaf blower. He denies hitting his head and reports no issues after his fall. He states he also felt dizzy on and off that day too and over the past week has felt more dizziness in general. Today for exercise he initially felt good, BP on arrival 124/76. After 7 minutes of exercise on the Nustep he dropped to 90/60. Gave pt 12 oz water and re-check BP 94/60-pt denies dizziness. After 6 minutes on the recumbent bike pt reported dizziness upon standing and BP was 82/60. Gave 18 oz water and sat and rested. Pt reported feeling better and seated BP was 92/60, standing 90/60. Pt advised to call cardiologist regarding dizziness with hypotension. For your review, thank you.     Alyson Davis  Exercise Physiologist   Cardiopulmonary Rehab/[facility]

## 2025-07-28 NOTE — CARDIO/PULMONARY
7/28/2025     Pt here today for 3rd visit of cardiac rehab. Pt states he fell Saturday after doing yard work and felt like \"he got tripped up\" by the leaf blower. He denies hitting his head and reports no issues after his fall. He states he also felt dizzy on and off that day too and over the past week has felt more dizziness in general. Today for exercise he initially felt good, BP on arrival 124/76. After 7 minutes of exercise on the Nustep he dropped to 90/60. Gave pt 12 oz water and re-check BP 94/60-pt denies dizziness. After 6 minutes on the recumbent bike pt reported dizziness upon standing and BP was 82/60. Gave 18 oz water and sat and rested. Pt reported feeling better and seated BP was 92/60, standing 90/60. Pt advised to call cardiologist regarding dizziness with hypotension. For your review, thank you.    Alsyon Davis  Exercise Physiologist   Cardiopulmonary Rehab/[facility]  [phone]

## 2025-08-04 ENCOUNTER — HOSPITAL ENCOUNTER (OUTPATIENT)
Dept: CARDIAC REHAB | Age: 80
Setting detail: RECURRING SERIES
Discharge: HOME | End: 2025-08-07
Payer: MEDICARE

## 2025-08-04 PROCEDURE — 93798 PHYS/QHP OP CAR RHAB W/ECG: CPT

## 2025-08-07 ENCOUNTER — HOSPITAL ENCOUNTER (OUTPATIENT)
Dept: CARDIAC REHAB | Age: 80
Setting detail: RECURRING SERIES
Discharge: HOME | End: 2025-08-10
Payer: MEDICARE

## 2025-08-07 ENCOUNTER — OFFICE VISIT (OUTPATIENT)
Age: 80
End: 2025-08-07
Payer: MEDICARE

## 2025-08-07 VITALS
DIASTOLIC BLOOD PRESSURE: 84 MMHG | HEIGHT: 69 IN | SYSTOLIC BLOOD PRESSURE: 124 MMHG | HEART RATE: 62 BPM | WEIGHT: 215 LBS | BODY MASS INDEX: 31.84 KG/M2

## 2025-08-07 DIAGNOSIS — I25.10 CAD, MULTIPLE VESSEL: Primary | Chronic | ICD-10-CM

## 2025-08-07 DIAGNOSIS — I50.22 CHRONIC SYSTOLIC CONGESTIVE HEART FAILURE (HCC): ICD-10-CM

## 2025-08-07 DIAGNOSIS — I10 PRIMARY HYPERTENSION: ICD-10-CM

## 2025-08-07 DIAGNOSIS — E78.2 MIXED HYPERLIPIDEMIA: ICD-10-CM

## 2025-08-07 DIAGNOSIS — Z95.1 S/P CABG X 3: ICD-10-CM

## 2025-08-07 DIAGNOSIS — Z72.0 TOBACCO ABUSE: Chronic | ICD-10-CM

## 2025-08-07 PROCEDURE — 93798 PHYS/QHP OP CAR RHAB W/ECG: CPT

## 2025-08-07 PROCEDURE — 1159F MED LIST DOCD IN RCRD: CPT | Performed by: INTERNAL MEDICINE

## 2025-08-07 PROCEDURE — 99214 OFFICE O/P EST MOD 30 MIN: CPT | Performed by: INTERNAL MEDICINE

## 2025-08-07 PROCEDURE — 1036F TOBACCO NON-USER: CPT | Performed by: INTERNAL MEDICINE

## 2025-08-07 PROCEDURE — 3079F DIAST BP 80-89 MM HG: CPT | Performed by: INTERNAL MEDICINE

## 2025-08-07 PROCEDURE — 1126F AMNT PAIN NOTED NONE PRSNT: CPT | Performed by: INTERNAL MEDICINE

## 2025-08-07 PROCEDURE — 1123F ACP DISCUSS/DSCN MKR DOCD: CPT | Performed by: INTERNAL MEDICINE

## 2025-08-07 PROCEDURE — G8427 DOCREV CUR MEDS BY ELIG CLIN: HCPCS | Performed by: INTERNAL MEDICINE

## 2025-08-07 PROCEDURE — 3074F SYST BP LT 130 MM HG: CPT | Performed by: INTERNAL MEDICINE

## 2025-08-07 PROCEDURE — G8417 CALC BMI ABV UP PARAM F/U: HCPCS | Performed by: INTERNAL MEDICINE

## 2025-08-07 PROCEDURE — 1160F RVW MEDS BY RX/DR IN RCRD: CPT | Performed by: INTERNAL MEDICINE

## 2025-08-07 ASSESSMENT — ENCOUNTER SYMPTOMS: COUGH: 0

## 2025-08-13 ENCOUNTER — TELEPHONE (OUTPATIENT)
Dept: ORTHOPEDIC SURGERY | Age: 80
End: 2025-08-13

## 2025-08-14 ENCOUNTER — APPOINTMENT (OUTPATIENT)
Dept: CARDIAC REHAB | Age: 80
End: 2025-08-14
Payer: MEDICARE

## 2025-08-18 ENCOUNTER — HOSPITAL ENCOUNTER (OUTPATIENT)
Dept: CARDIAC REHAB | Age: 80
Setting detail: RECURRING SERIES
Discharge: HOME | End: 2025-08-21
Payer: MEDICARE

## 2025-08-18 PROCEDURE — 93798 PHYS/QHP OP CAR RHAB W/ECG: CPT

## 2025-08-20 ENCOUNTER — OFFICE VISIT (OUTPATIENT)
Dept: ORTHOPEDIC SURGERY | Age: 80
End: 2025-08-20

## 2025-08-20 DIAGNOSIS — M19.012 ARTHRITIS OF LEFT SHOULDER: Primary | ICD-10-CM

## 2025-08-20 RX ORDER — METHYLPREDNISOLONE ACETATE 40 MG/ML
40 INJECTION, SUSPENSION INTRA-ARTICULAR; INTRALESIONAL; INTRAMUSCULAR; SOFT TISSUE ONCE
Status: COMPLETED | OUTPATIENT
Start: 2025-08-20 | End: 2025-08-20

## 2025-08-20 RX ADMIN — METHYLPREDNISOLONE ACETATE 40 MG: 40 INJECTION, SUSPENSION INTRA-ARTICULAR; INTRALESIONAL; INTRAMUSCULAR; SOFT TISSUE at 13:51

## 2025-08-21 ENCOUNTER — HOSPITAL ENCOUNTER (OUTPATIENT)
Dept: CARDIAC REHAB | Age: 80
Setting detail: RECURRING SERIES
End: 2025-08-21
Payer: MEDICARE

## 2025-08-25 ENCOUNTER — HOSPITAL ENCOUNTER (OUTPATIENT)
Dept: CARDIAC REHAB | Age: 80
Setting detail: RECURRING SERIES
Discharge: HOME | End: 2025-08-28
Payer: MEDICARE

## 2025-08-25 PROCEDURE — 93798 PHYS/QHP OP CAR RHAB W/ECG: CPT

## 2025-08-28 ENCOUNTER — APPOINTMENT (OUTPATIENT)
Dept: CARDIAC REHAB | Age: 80
End: 2025-08-28
Payer: MEDICARE

## 2025-09-03 ENCOUNTER — APPOINTMENT (OUTPATIENT)
Dept: CT IMAGING | Age: 80
End: 2025-09-03
Payer: MEDICARE

## 2025-09-03 ENCOUNTER — APPOINTMENT (OUTPATIENT)
Dept: GENERAL RADIOLOGY | Age: 80
End: 2025-09-03
Payer: MEDICARE

## 2025-09-03 ENCOUNTER — HOSPITAL ENCOUNTER (OUTPATIENT)
Dept: CARDIAC REHAB | Age: 80
Setting detail: RECURRING SERIES
Discharge: HOME | End: 2025-09-06

## 2025-09-03 ENCOUNTER — HOSPITAL ENCOUNTER (EMERGENCY)
Age: 80
Discharge: ANOTHER HOSPITAL | End: 2025-09-03
Attending: EMERGENCY MEDICINE
Payer: MEDICARE

## 2025-09-03 VITALS
HEIGHT: 71 IN | HEART RATE: 94 BPM | BODY MASS INDEX: 30.32 KG/M2 | WEIGHT: 216.6 LBS | DIASTOLIC BLOOD PRESSURE: 72 MMHG | SYSTOLIC BLOOD PRESSURE: 129 MMHG | RESPIRATION RATE: 20 BRPM | OXYGEN SATURATION: 96 % | TEMPERATURE: 98.4 F

## 2025-09-03 DIAGNOSIS — R29.6 MULTIPLE FALLS: Primary | ICD-10-CM

## 2025-09-03 DIAGNOSIS — S51.012A SKIN TEAR OF LEFT ELBOW WITHOUT COMPLICATION, INITIAL ENCOUNTER: ICD-10-CM

## 2025-09-03 DIAGNOSIS — M79.605 LOW BACK PAIN RADIATING TO LEFT LEG: ICD-10-CM

## 2025-09-03 DIAGNOSIS — R26.2 INABILITY TO WALK: ICD-10-CM

## 2025-09-03 DIAGNOSIS — M54.50 LOW BACK PAIN RADIATING TO LEFT LEG: ICD-10-CM

## 2025-09-03 PROBLEM — I48.0 PAROXYSMAL ATRIAL FIBRILLATION (HCC): Status: ACTIVE | Noted: 2025-06-16

## 2025-09-03 PROBLEM — M54.42 ACUTE BACK PAIN WITH SCIATICA, LEFT: Status: ACTIVE | Noted: 2025-09-03

## 2025-09-03 LAB
ANION GAP SERPL CALC-SCNC: 12 MMOL/L (ref 7–16)
APPEARANCE UR: CLEAR
BACTERIA URNS QL MICRO: 0 /HPF
BASOPHILS # BLD: 0.02 K/UL (ref 0–0.2)
BASOPHILS NFR BLD: 0.2 % (ref 0–2)
BILIRUB UR QL: NEGATIVE
BUN SERPL-MCNC: 32 MG/DL (ref 8–23)
CALCIUM SERPL-MCNC: 9.2 MG/DL (ref 8.8–10.2)
CHLORIDE SERPL-SCNC: 105 MMOL/L (ref 98–107)
CO2 SERPL-SCNC: 23 MMOL/L (ref 20–29)
COLOR UR: YELLOW
CREAT SERPL-MCNC: 1.64 MG/DL (ref 0.8–1.3)
DIFFERENTIAL METHOD BLD: ABNORMAL
EOSINOPHIL # BLD: 0.19 K/UL (ref 0–0.8)
EOSINOPHIL NFR BLD: 1.7 % (ref 0.5–7.8)
EPI CELLS #/AREA URNS HPF: 0 /HPF
ERYTHROCYTE [DISTWIDTH] IN BLOOD BY AUTOMATED COUNT: 13 % (ref 11.9–14.6)
GLUCOSE SERPL-MCNC: 139 MG/DL (ref 70–99)
GLUCOSE UR STRIP.AUTO-MCNC: 500 MG/DL
HCT VFR BLD AUTO: 43 % (ref 41.1–50.3)
HGB BLD-MCNC: 13.9 G/DL (ref 13.6–17.2)
HGB UR QL STRIP: ABNORMAL
IMM GRANULOCYTES # BLD AUTO: 0.03 K/UL (ref 0–0.5)
IMM GRANULOCYTES NFR BLD AUTO: 0.3 % (ref 0–5)
KETONES UR QL STRIP.AUTO: NEGATIVE MG/DL
LEUKOCYTE ESTERASE UR QL STRIP.AUTO: NEGATIVE
LYMPHOCYTES # BLD: 1.99 K/UL (ref 0.5–4.6)
LYMPHOCYTES NFR BLD: 17.8 % (ref 13–44)
MCH RBC QN AUTO: 32.7 PG (ref 26.1–32.9)
MCHC RBC AUTO-ENTMCNC: 32.3 G/DL (ref 31.4–35)
MCV RBC AUTO: 101.2 FL (ref 82–102)
MONOCYTES # BLD: 1.1 K/UL (ref 0.1–1.3)
MONOCYTES NFR BLD: 9.9 % (ref 4–12)
MUCOUS THREADS URNS QL MICRO: 0 /LPF
NEUTS SEG # BLD: 7.83 K/UL (ref 1.7–8.2)
NEUTS SEG NFR BLD: 70.1 % (ref 43–78)
NITRITE UR QL STRIP.AUTO: NEGATIVE
NRBC # BLD: 0 K/UL (ref 0–0.2)
OTHER OBSERVATIONS: ABNORMAL
PH UR STRIP: 6.5 (ref 5–9)
PLATELET # BLD AUTO: 270 K/UL (ref 150–450)
PMV BLD AUTO: 10.4 FL (ref 9.4–12.3)
POTASSIUM SERPL-SCNC: 4.9 MMOL/L (ref 3.5–5.1)
PROT UR STRIP-MCNC: NEGATIVE MG/DL
RBC # BLD AUTO: 4.25 M/UL (ref 4.23–5.6)
RBC #/AREA URNS HPF: ABNORMAL /HPF
SODIUM SERPL-SCNC: 140 MMOL/L (ref 133–143)
SP GR UR REFRACTOMETRY: 1.01 (ref 1–1.02)
UROBILINOGEN UR QL STRIP.AUTO: 0.2 EU/DL (ref 0.2–1)
WBC # BLD AUTO: 11.2 K/UL (ref 4.3–11.1)
WBC URNS QL MICRO: 0 /HPF

## 2025-09-03 PROCEDURE — 85025 COMPLETE CBC W/AUTO DIFF WBC: CPT

## 2025-09-03 PROCEDURE — 80048 BASIC METABOLIC PNL TOTAL CA: CPT

## 2025-09-03 PROCEDURE — 6360000002 HC RX W HCPCS: Performed by: EMERGENCY MEDICINE

## 2025-09-03 PROCEDURE — 6370000000 HC RX 637 (ALT 250 FOR IP): Performed by: EMERGENCY MEDICINE

## 2025-09-03 PROCEDURE — 72100 X-RAY EXAM L-S SPINE 2/3 VWS: CPT

## 2025-09-03 PROCEDURE — 70450 CT HEAD/BRAIN W/O DYE: CPT

## 2025-09-03 PROCEDURE — 81001 URINALYSIS AUTO W/SCOPE: CPT

## 2025-09-03 PROCEDURE — 73080 X-RAY EXAM OF ELBOW: CPT

## 2025-09-03 RX ORDER — MORPHINE SULFATE 4 MG/ML
4 INJECTION INTRAVENOUS ONCE
Refills: 0 | Status: COMPLETED | OUTPATIENT
Start: 2025-09-03 | End: 2025-09-03

## 2025-09-03 RX ORDER — PREDNISONE 10 MG/1
40 TABLET ORAL ONCE
Status: COMPLETED | OUTPATIENT
Start: 2025-09-03 | End: 2025-09-03

## 2025-09-03 RX ADMIN — PREDNISONE 40 MG: 10 TABLET ORAL at 09:26

## 2025-09-03 RX ADMIN — MORPHINE SULFATE 4 MG: 4 INJECTION INTRAVENOUS at 13:11

## 2025-09-03 ASSESSMENT — PAIN DESCRIPTION - DESCRIPTORS
DESCRIPTORS: ACHING
DESCRIPTORS: SORE

## 2025-09-03 ASSESSMENT — PAIN DESCRIPTION - ORIENTATION
ORIENTATION: LEFT
ORIENTATION: LEFT

## 2025-09-03 ASSESSMENT — PAIN SCALES - GENERAL
PAINLEVEL_OUTOF10: 5

## 2025-09-03 ASSESSMENT — PAIN DESCRIPTION - PAIN TYPE: TYPE: ACUTE PAIN

## 2025-09-03 ASSESSMENT — PAIN DESCRIPTION - LOCATION
LOCATION: ARM

## 2025-09-03 ASSESSMENT — LIFESTYLE VARIABLES
HOW MANY STANDARD DRINKS CONTAINING ALCOHOL DO YOU HAVE ON A TYPICAL DAY: PATIENT DOES NOT DRINK
HOW MANY STANDARD DRINKS CONTAINING ALCOHOL DO YOU HAVE ON A TYPICAL DAY: PATIENT DOES NOT DRINK
HOW OFTEN DO YOU HAVE A DRINK CONTAINING ALCOHOL: NEVER

## 2025-09-03 ASSESSMENT — PAIN - FUNCTIONAL ASSESSMENT
PAIN_FUNCTIONAL_ASSESSMENT: 0-10
PAIN_FUNCTIONAL_ASSESSMENT: ACTIVITIES ARE NOT PREVENTED
PAIN_FUNCTIONAL_ASSESSMENT: 0-10
PAIN_FUNCTIONAL_ASSESSMENT: 0-10

## 2025-09-05 PROBLEM — Z78.9 DECREASED ACTIVITIES OF DAILY LIVING (ADL): Status: ACTIVE | Noted: 2025-09-05

## 2025-09-05 PROBLEM — R26.9 GAIT ABNORMALITY: Status: ACTIVE | Noted: 2025-09-05

## 2025-09-05 PROBLEM — Z51.89 ENCOUNTER FOR OTHER SPECIFIED AFTERCARE: Status: ACTIVE | Noted: 2025-09-05

## (undated) DEVICE — SUTURE ETHIBOND EXCEL 2-0 L30IN NONABSORBABLE GRN L26MM SH MX563

## (undated) DEVICE — SUTURE PROL DBL ARMED 8 0 BV130 5 24IN N ABSRB MFIL BLU M8739

## (undated) DEVICE — SUTURE ETHIBOND EXCEL SZ 0 L18IN NONABSORBABLE GRN L36MM CT-1 CX21D

## (undated) DEVICE — SUTURE PDS II SZ 1 L36IN ABSRB VLT L48MM CTX 1/2 CIR Z371T

## (undated) DEVICE — SUTURE VCRL SZ 3-0 L27IN ABSRB UD L26MM SH 1/2 CIR J416H

## (undated) DEVICE — STERILE HOOK LOCK LATEX FREE ELASTIC BANDAGE 4INX5YD: Brand: HOOK LOCK™

## (undated) DEVICE — GLOVE SURG SZ 65 CRM LTX FREE POLYISOPRENE POLYMER BEAD ANTI

## (undated) DEVICE — SUTURE VICRYL + SZ 4-0 L27IN ABSRB UD PS-2 3/8 CIR REV CUT VCP426H

## (undated) DEVICE — Device

## (undated) DEVICE — VALVE HEMSTAS W/ GWIRE INSRTN TOOL GRDIAN II NC

## (undated) DEVICE — STERNUM BLADE (45.9 X 0.635MM)

## (undated) DEVICE — CARDINAL HEALTH FLEXIBLE LIGHT HANDLE COVER: Brand: CARDINAL HEALTH

## (undated) DEVICE — CANISTER, RIGID, 3000CC: Brand: MEDLINE INDUSTRIES, INC.

## (undated) DEVICE — CLOTH PRE OP W9XL10.5IN 2% CHG FRAGRANCE RNS FREE READYPREP

## (undated) DEVICE — MPS® DELIVERY SET WITH 6 FT. DELIVERY TUBING: Brand: MPS

## (undated) DEVICE — ELECTRODE PT RET AD L9FT HI MOIST COND ADH HYDRGEL CORDED

## (undated) DEVICE — GLIDESHEATH SLENDER STAINLESS STEEL KIT: Brand: GLIDESHEATH SLENDER

## (undated) DEVICE — 3M™ TEGADERM™ TRANSPARENT FILM DRESSING FRAME STYLE, 1626W, 4 IN X 4-3/4 IN (10 CM X 12 CM), 50/CT 4CT/CASE: Brand: 3M™ TEGADERM™

## (undated) DEVICE — SUTURE NONABSORBABLE L24IN SZ 7-0 M0-5 BV175-8 EP 24 BLU M8745

## (undated) DEVICE — SOL IRR SOD CHL 0.9% TITAN XL CNTNR 3000ML

## (undated) DEVICE — DRAIN SURG SGL COLL PT TB FOR ATS BG OASIS

## (undated) DEVICE — PENCIL ES L3M BTTN SWCH HOLSTER W/ BLDE ELECTRD EDGE

## (undated) DEVICE — AUTOTRANSFUSION KIT 120 MH FAST STRT AT1 FOR CATS +

## (undated) DEVICE — CABG DR WILLIAMS: Brand: MEDLINE INDUSTRIES, INC.

## (undated) DEVICE — SUTURE VCRL SZ 3-0 L18IN ABSRB UD L26MM SH 1/2 CIR J864D

## (undated) DEVICE — SOLUTION IRRIG 1000ML 09% SOD CHL USP PIC PLAS CONTAINER

## (undated) DEVICE — SUTURE S STL SZ 6 L18IN NONABSORBABLE SIL L48MM CCS 1/2 CIR M654G

## (undated) DEVICE — SUTURE PROL SZ 3-0 L36IN NONABSORBABLE BLU L26MM SH 1/2 CIR 8522H

## (undated) DEVICE — PERFUSION PACK XCOATING 76320] TERUMO CARDIOVASCULAR]

## (undated) DEVICE — NEEDLE HYPO 21GA L1.5IN INTRAMUSCULAR S STL LATCH BVL UP

## (undated) DEVICE — SYSTEM ENDOSCP VES HARV W/ TOOL CANN SEAL SHT PRT BLNT TIP

## (undated) DEVICE — PERCUTANEOUS INSERTION KIT-ARTERIAL: Brand: PERCUTANEOUS INSERTION KIT-ARTERIAL

## (undated) DEVICE — SYRINGE MED 10ML LUERLOCK TIP W/O SFTY DISP

## (undated) DEVICE — SUTURE PROL SZ 6-0 L30IN NONABSORBABLE BLU L13MM CC-1 3/8 M8707

## (undated) DEVICE — SYRINGE MED 20ML STD CLR PLAS LUERSLIP TIP N CTRL DISP

## (undated) DEVICE — DUAL LUMEN STOMACH TUBE: Brand: SALEM SUMP

## (undated) DEVICE — CATHETER DIAG 5FR L100CM LUMN ID0.047IN JL3.5 CRV 0 SIDE H

## (undated) DEVICE — FLOTRAC SENSOR W/ VAMP ADULT (60"/152CM): Brand: FLOTRAC, VAMP

## (undated) DEVICE — SOLUTION IRRIG 1000ML H2O PIC PLAS SHATTERPROOF CONTAINER

## (undated) DEVICE — SUTURE VCRL SZ 2-0 L27IN ABSRB UD L26MM SH 1/2 CIR J417H

## (undated) DEVICE — SUTURE PROL SZ 4-0 L30IN NONABSORBABLE BLU SH-1 L22MM 1/2 8526H

## (undated) DEVICE — CLAMP INSERT: Brand: STEALTH® CLAMP INSERT

## (undated) DEVICE — CATHETER THOR 32FR L23IN PVC 6 EYELET STR ATRAUM

## (undated) DEVICE — PRESSURE MONITORING SET: Brand: TRUWAVE, VAMP PLUS

## (undated) DEVICE — CATHETER GUIDE AD 6FR L100CM COR PERIPH GRN NYL PTFE XB L 3

## (undated) DEVICE — BLADE GRINDLESS 6400 BULK 100EA

## (undated) DEVICE — KIT CATH L11.5CM DIA9FR CTRL VEN POLYUR ANTIMIC COAT DBL

## (undated) DEVICE — 1/4 FORCE SURGICAL SPRING CLIP: Brand: STEALTH® SPRING CLIP

## (undated) DEVICE — SUTURE VICRYL SZ 3-0 L36IN ABSRB UD L36MM CT-1 1/2 CIR J944H

## (undated) DEVICE — DRAIN SURG PENROSE 0.25X12 IN CLOSED WND DRAINAGE PREM SIL

## (undated) DEVICE — GUIDEWIRE 035IN 210CM PTFE COAT FIX COR J TIP 15MM FIRM BODY

## (undated) DEVICE — SUTURE PERMAHAND SZ 2-0 L12X18IN NONABSORBABLE BLK SILK A185H

## (undated) DEVICE — TTL1LYR 16FR10ML 100%SIL TMPST TR: Brand: MEDLINE

## (undated) DEVICE — GLOVE SURG SZ 7 L12IN FNGR THK79MIL GRN LTX FREE

## (undated) DEVICE — CATHETER COR DIAG PIGTAILS PIG 145 CRV 5FR 110CM 6 SIDE H

## (undated) DEVICE — CATHETER THOR 24FR L22IN PVC 5 EYELET STR ATRAUM

## (undated) DEVICE — SUTURE PROL SZ 2-0 L36IN NONABSORBABLE BLU SH L26MM 1/2 CIR 8523H

## (undated) DEVICE — CATHETER COR DIAG 4.0 5FR 110CM 2 SIDE H

## (undated) DEVICE — 3M™ IOBAN™ 2 ANTIMICROBIAL INCISE DRAPE 6650EZ: Brand: IOBAN™ 2

## (undated) DEVICE — CLIP LIG SM TI 20 BLU HNDL FOR OPN AND ENDOSCP SGL APPL

## (undated) DEVICE — PAD,NON-ADHERENT,3X8,STERILE,LF,1/PK: Brand: MEDLINE

## (undated) DEVICE — Device: Brand: OMNIWIRE PRESSURE GUIDE WIRE

## (undated) DEVICE — SUTURE SILK PERMAHAND PRECUT 6 X 30 IN SZ 1 BLK BRAID A307H

## (undated) DEVICE — SURGIFOAM SPNG SZ 100

## (undated) DEVICE — BAND COMPR L24CM REG CLR PLAS HEMSTAT EXT HK AND LOOP RETEN

## (undated) DEVICE — MINOR SPLIT GENERAL: Brand: MEDLINE INDUSTRIES, INC.

## (undated) DEVICE — PREMIUM WET SKIN PREP TRAY: Brand: MEDLINE INDUSTRIES, INC.

## (undated) DEVICE — GLOVE SURG SZ 65 THK91MIL LTX FREE SYN POLYISOPRENE